# Patient Record
Sex: MALE | Race: BLACK OR AFRICAN AMERICAN | NOT HISPANIC OR LATINO | URBAN - METROPOLITAN AREA
[De-identification: names, ages, dates, MRNs, and addresses within clinical notes are randomized per-mention and may not be internally consistent; named-entity substitution may affect disease eponyms.]

---

## 2017-04-04 ENCOUNTER — INPATIENT (INPATIENT)
Facility: HOSPITAL | Age: 82
LOS: 1 days | Discharge: HOME HEALTH | End: 2017-04-06
Attending: HOSPITALIST | Admitting: HOSPITALIST
Payer: MEDICARE

## 2017-04-04 VITALS
HEART RATE: 66 BPM | RESPIRATION RATE: 22 BRPM | SYSTOLIC BLOOD PRESSURE: 157 MMHG | DIASTOLIC BLOOD PRESSURE: 57 MMHG | OXYGEN SATURATION: 99 %

## 2017-04-04 DIAGNOSIS — E11.9 TYPE 2 DIABETES MELLITUS WITHOUT COMPLICATIONS: ICD-10-CM

## 2017-04-04 DIAGNOSIS — I10 ESSENTIAL (PRIMARY) HYPERTENSION: ICD-10-CM

## 2017-04-04 DIAGNOSIS — R41.89 OTHER SYMPTOMS AND SIGNS INVOLVING COGNITIVE FUNCTIONS AND AWARENESS: ICD-10-CM

## 2017-04-04 DIAGNOSIS — N40.0 BENIGN PROSTATIC HYPERPLASIA WITHOUT LOWER URINARY TRACT SYMPTOMS: ICD-10-CM

## 2017-04-04 DIAGNOSIS — Z41.8 ENCOUNTER FOR OTHER PROCEDURES FOR PURPOSES OTHER THAN REMEDYING HEALTH STATE: ICD-10-CM

## 2017-04-04 DIAGNOSIS — N17.9 ACUTE KIDNEY FAILURE, UNSPECIFIED: ICD-10-CM

## 2017-04-04 DIAGNOSIS — R41.82 ALTERED MENTAL STATUS, UNSPECIFIED: ICD-10-CM

## 2017-04-04 DIAGNOSIS — F03.90 UNSPECIFIED DEMENTIA, UNSPECIFIED SEVERITY, WITHOUT BEHAVIORAL DISTURBANCE, PSYCHOTIC DISTURBANCE, MOOD DISTURBANCE, AND ANXIETY: ICD-10-CM

## 2017-04-04 LAB
ALBUMIN SERPL ELPH-MCNC: 3.8 G/DL — SIGNIFICANT CHANGE UP (ref 3.3–5)
ALP SERPL-CCNC: 85 U/L — SIGNIFICANT CHANGE UP (ref 40–120)
ALT FLD-CCNC: 16 U/L — SIGNIFICANT CHANGE UP (ref 4–41)
APTT BLD: 27.3 SEC — LOW (ref 27.5–37.4)
AST SERPL-CCNC: 26 U/L — SIGNIFICANT CHANGE UP (ref 4–40)
BASE EXCESS BLDV CALC-SCNC: 0.2 MMOL/L — SIGNIFICANT CHANGE UP
BASE EXCESS BLDV CALC-SCNC: 4.2 MMOL/L — SIGNIFICANT CHANGE UP
BASOPHILS # BLD AUTO: 0.01 K/UL — SIGNIFICANT CHANGE UP (ref 0–0.2)
BASOPHILS NFR BLD AUTO: 0.2 % — SIGNIFICANT CHANGE UP (ref 0–2)
BILIRUB SERPL-MCNC: < 0.2 MG/DL — LOW (ref 0.2–1.2)
BLOOD GAS VENOUS - CREATININE: 1.39 MG/DL — HIGH (ref 0.5–1.3)
BLOOD GAS VENOUS - CREATININE: SIGNIFICANT CHANGE UP MG/DL (ref 0.5–1.3)
BUN SERPL-MCNC: 23 MG/DL — SIGNIFICANT CHANGE UP (ref 7–23)
CALCIUM SERPL-MCNC: 9.1 MG/DL — SIGNIFICANT CHANGE UP (ref 8.4–10.5)
CHLORIDE BLDV-SCNC: 109 MMOL/L — HIGH (ref 96–108)
CHLORIDE BLDV-SCNC: 109 MMOL/L — HIGH (ref 96–108)
CHLORIDE SERPL-SCNC: 104 MMOL/L — SIGNIFICANT CHANGE UP (ref 98–107)
CK MB BLD-MCNC: 1.7 — SIGNIFICANT CHANGE UP (ref 0–2.5)
CK MB BLD-MCNC: 3.25 NG/ML — SIGNIFICANT CHANGE UP (ref 1–6.6)
CK SERPL-CCNC: 190 U/L — SIGNIFICANT CHANGE UP (ref 30–200)
CO2 SERPL-SCNC: 16 MMOL/L — LOW (ref 22–31)
CREAT SERPL-MCNC: 1.42 MG/DL — HIGH (ref 0.5–1.3)
EOSINOPHIL # BLD AUTO: 0.11 K/UL — SIGNIFICANT CHANGE UP (ref 0–0.5)
EOSINOPHIL NFR BLD AUTO: 1.7 % — SIGNIFICANT CHANGE UP (ref 0–6)
GAS PNL BLDV: 137 MMOL/L — SIGNIFICANT CHANGE UP (ref 136–146)
GAS PNL BLDV: 137 MMOL/L — SIGNIFICANT CHANGE UP (ref 136–146)
GLUCOSE BLDV-MCNC: 133 — HIGH (ref 70–99)
GLUCOSE BLDV-MCNC: 164 — HIGH (ref 70–99)
GLUCOSE SERPL-MCNC: 171 MG/DL — HIGH (ref 70–99)
HCO3 BLDV-SCNC: 23 MMOL/L — SIGNIFICANT CHANGE UP (ref 20–27)
HCO3 BLDV-SCNC: 27 MMOL/L — SIGNIFICANT CHANGE UP (ref 20–27)
HCT VFR BLD CALC: 42.8 % — SIGNIFICANT CHANGE UP (ref 39–50)
HCT VFR BLDV CALC: 38.8 % — LOW (ref 39–51)
HCT VFR BLDV CALC: 43.9 % — SIGNIFICANT CHANGE UP (ref 39–51)
HGB BLD-MCNC: 13.8 G/DL — SIGNIFICANT CHANGE UP (ref 13–17)
HGB BLDV-MCNC: 12.6 G/DL — LOW (ref 13–17)
HGB BLDV-MCNC: 14.3 G/DL — SIGNIFICANT CHANGE UP (ref 13–17)
IMM GRANULOCYTES NFR BLD AUTO: 0.2 % — SIGNIFICANT CHANGE UP (ref 0–1.5)
INR BLD: 1 — SIGNIFICANT CHANGE UP (ref 0.88–1.17)
LACTATE BLDV-MCNC: 1.6 MMOL/L — SIGNIFICANT CHANGE UP (ref 0.5–2)
LACTATE BLDV-MCNC: 2.9 MMOL/L — HIGH (ref 0.5–2)
LYMPHOCYTES # BLD AUTO: 2.84 K/UL — SIGNIFICANT CHANGE UP (ref 1–3.3)
LYMPHOCYTES # BLD AUTO: 44.8 % — HIGH (ref 13–44)
MCHC RBC-ENTMCNC: 29.6 PG — SIGNIFICANT CHANGE UP (ref 27–34)
MCHC RBC-ENTMCNC: 32.2 % — SIGNIFICANT CHANGE UP (ref 32–36)
MCV RBC AUTO: 91.8 FL — SIGNIFICANT CHANGE UP (ref 80–100)
MONOCYTES # BLD AUTO: 0.3 K/UL — SIGNIFICANT CHANGE UP (ref 0–0.9)
MONOCYTES NFR BLD AUTO: 4.7 % — SIGNIFICANT CHANGE UP (ref 2–14)
NEUTROPHILS # BLD AUTO: 3.07 K/UL — SIGNIFICANT CHANGE UP (ref 1.8–7.4)
NEUTROPHILS NFR BLD AUTO: 48.4 % — SIGNIFICANT CHANGE UP (ref 43–77)
PCO2 BLDV: 47 MMHG — SIGNIFICANT CHANGE UP (ref 41–51)
PCO2 BLDV: 49 MMHG — SIGNIFICANT CHANGE UP (ref 41–51)
PH BLDV: 7.34 PH — SIGNIFICANT CHANGE UP (ref 7.32–7.43)
PH BLDV: 7.4 PH — SIGNIFICANT CHANGE UP (ref 7.32–7.43)
PLATELET # BLD AUTO: 149 K/UL — LOW (ref 150–400)
PMV BLD: 11.6 FL — SIGNIFICANT CHANGE UP (ref 7–13)
PO2 BLDV: 24 MMHG — LOW (ref 35–40)
PO2 BLDV: 37 MMHG — SIGNIFICANT CHANGE UP (ref 35–40)
POTASSIUM BLDV-SCNC: 4 MMOL/L — SIGNIFICANT CHANGE UP (ref 3.4–4.5)
POTASSIUM BLDV-SCNC: 4 MMOL/L — SIGNIFICANT CHANGE UP (ref 3.4–4.5)
POTASSIUM SERPL-MCNC: 4.8 MMOL/L — SIGNIFICANT CHANGE UP (ref 3.5–5.3)
POTASSIUM SERPL-SCNC: 4.8 MMOL/L — SIGNIFICANT CHANGE UP (ref 3.5–5.3)
PROT SERPL-MCNC: 7.1 G/DL — SIGNIFICANT CHANGE UP (ref 6–8.3)
PROTHROM AB SERPL-ACNC: 11.2 SEC — SIGNIFICANT CHANGE UP (ref 9.8–13.1)
RBC # BLD: 4.66 M/UL — SIGNIFICANT CHANGE UP (ref 4.2–5.8)
RBC # FLD: 13.2 % — SIGNIFICANT CHANGE UP (ref 10.3–14.5)
SAO2 % BLDV: 39.2 % — LOW (ref 60–85)
SAO2 % BLDV: 63 % — SIGNIFICANT CHANGE UP (ref 60–85)
SODIUM SERPL-SCNC: 140 MMOL/L — SIGNIFICANT CHANGE UP (ref 135–145)
TROPONIN T SERPL-MCNC: < 0.06 NG/ML — SIGNIFICANT CHANGE UP (ref 0–0.06)
WBC # BLD: 6.34 K/UL — SIGNIFICANT CHANGE UP (ref 3.8–10.5)
WBC # FLD AUTO: 6.34 K/UL — SIGNIFICANT CHANGE UP (ref 3.8–10.5)

## 2017-04-04 PROCEDURE — 70450 CT HEAD/BRAIN W/O DYE: CPT | Mod: 26

## 2017-04-04 PROCEDURE — 71010: CPT | Mod: 26

## 2017-04-04 PROCEDURE — 99223 1ST HOSP IP/OBS HIGH 75: CPT | Mod: GC

## 2017-04-04 RX ORDER — DONEPEZIL HYDROCHLORIDE 10 MG/1
10 TABLET, FILM COATED ORAL AT BEDTIME
Qty: 0 | Refills: 0 | Status: DISCONTINUED | OUTPATIENT
Start: 2017-04-04 | End: 2017-04-06

## 2017-04-04 RX ORDER — TAMSULOSIN HYDROCHLORIDE 0.4 MG/1
0.4 CAPSULE ORAL AT BEDTIME
Qty: 0 | Refills: 0 | Status: DISCONTINUED | OUTPATIENT
Start: 2017-04-04 | End: 2017-04-06

## 2017-04-04 RX ORDER — INSULIN LISPRO 100/ML
VIAL (ML) SUBCUTANEOUS
Qty: 0 | Refills: 0 | Status: DISCONTINUED | OUTPATIENT
Start: 2017-04-04 | End: 2017-04-06

## 2017-04-04 RX ORDER — INSULIN LISPRO 100/ML
VIAL (ML) SUBCUTANEOUS AT BEDTIME
Qty: 0 | Refills: 0 | Status: DISCONTINUED | OUTPATIENT
Start: 2017-04-04 | End: 2017-04-06

## 2017-04-04 RX ORDER — GLUCAGON INJECTION, SOLUTION 0.5 MG/.1ML
1 INJECTION, SOLUTION SUBCUTANEOUS ONCE
Qty: 0 | Refills: 0 | Status: DISCONTINUED | OUTPATIENT
Start: 2017-04-04 | End: 2017-04-06

## 2017-04-04 RX ORDER — ASPIRIN/CALCIUM CARB/MAGNESIUM 324 MG
81 TABLET ORAL DAILY
Qty: 0 | Refills: 0 | Status: DISCONTINUED | OUTPATIENT
Start: 2017-04-04 | End: 2017-04-06

## 2017-04-04 RX ORDER — DEXTROSE 50 % IN WATER 50 %
25 SYRINGE (ML) INTRAVENOUS ONCE
Qty: 0 | Refills: 0 | Status: DISCONTINUED | OUTPATIENT
Start: 2017-04-04 | End: 2017-04-06

## 2017-04-04 RX ORDER — DEXTROSE 50 % IN WATER 50 %
1 SYRINGE (ML) INTRAVENOUS ONCE
Qty: 0 | Refills: 0 | Status: DISCONTINUED | OUTPATIENT
Start: 2017-04-04 | End: 2017-04-06

## 2017-04-04 RX ORDER — HEPARIN SODIUM 5000 [USP'U]/ML
5000 INJECTION INTRAVENOUS; SUBCUTANEOUS EVERY 12 HOURS
Qty: 0 | Refills: 0 | Status: DISCONTINUED | OUTPATIENT
Start: 2017-04-04 | End: 2017-04-06

## 2017-04-04 RX ORDER — METOPROLOL TARTRATE 50 MG
50 TABLET ORAL DAILY
Qty: 0 | Refills: 0 | Status: DISCONTINUED | OUTPATIENT
Start: 2017-04-04 | End: 2017-04-05

## 2017-04-04 RX ORDER — DEXTROSE 50 % IN WATER 50 %
12.5 SYRINGE (ML) INTRAVENOUS ONCE
Qty: 0 | Refills: 0 | Status: DISCONTINUED | OUTPATIENT
Start: 2017-04-04 | End: 2017-04-06

## 2017-04-04 RX ORDER — SODIUM CHLORIDE 9 MG/ML
1000 INJECTION, SOLUTION INTRAVENOUS
Qty: 0 | Refills: 0 | Status: DISCONTINUED | OUTPATIENT
Start: 2017-04-04 | End: 2017-04-06

## 2017-04-04 NOTE — H&P ADULT. - NEGATIVE GASTROINTESTINAL SYMPTOMS
no melena/no hematochezia/no vomiting/no diarrhea/no constipation/no abdominal pain/no nausea/no jaundice no hematochezia/no diarrhea/no vomiting/no change in bowel habits/no abdominal pain/no nausea/no constipation/no melena

## 2017-04-04 NOTE — H&P ADULT. - PROBLEM SELECTOR PLAN 5
Continue with Flomax daily CT head revealed "Preserved gray-white matter differentiation. No intracranial hemorrhage, mass effect or displaced/depressed calvarial fracture. Cystic focus within the posterior sella turcica which may represent a pituitary cyst or cystic adenoma. Clinical correlation will determine the need for nonemergent contrast enhanced MR of the pituitary gland for further evaluation"  Will need to check Cr in am and if improved post fluids will order MRI of pituitary gland

## 2017-04-04 NOTE — H&P ADULT. - NEGATIVE MUSCULOSKELETAL SYMPTOMS
no arthralgia/no back pain/no leg pain L/no neck pain/no myalgia/no muscle cramps/no arm pain L/no joint swelling/no leg pain R/no arm pain R no stiffness/no muscle weakness/no neck pain/no muscle cramps/no arthralgia/no joint swelling/no myalgia

## 2017-04-04 NOTE — H&P ADULT. - FAMILY HISTORY
Mother  Still living? Unknown  Family history of diabetes mellitus, Age at diagnosis: Age Unknown  Family history of hypertension, Age at diagnosis: Age Unknown     Child  Still living? Unknown  Family history of lymphoma, Age at diagnosis: Age Unknown

## 2017-04-04 NOTE — H&P ADULT. - NEGATIVE CARDIOVASCULAR SYMPTOMS
no claudication/no palpitations/no orthopnea/no chest pain/no dyspnea on exertion/no paroxysmal nocturnal dyspnea no orthopnea/no paroxysmal nocturnal dyspnea/no palpitations/no chest pain/no dyspnea on exertion

## 2017-04-04 NOTE — H&P ADULT. - NEGATIVE OPHTHALMOLOGIC SYMPTOMS
no loss of vision L/no blurred vision L/no photophobia/no loss of vision R/no diplopia/no blurred vision R no photophobia/no lacrimation L/no discharge R/no discharge L/no lacrimation R/no diplopia/no blurred vision L/no blurred vision R

## 2017-04-04 NOTE — ED PROVIDER NOTE - ATTENDING CONTRIBUTION TO CARE
DR. PATEL, ATTENDING MD-  I performed a face to face bedside interview with patient regarding history of present illness, review of symptoms and past medical history. I completed an independent physical exam.  I have discussed patient's plan of care with the resident.   Documentation as above in the note.    attending: my exam: afocal neuro exam, lungs ctab, abd soft, nt.  A/P:altered mental status, likely secondary to hypotension (unclear etiology), now resolved.  Patient last seen normal prior to the event  >3 hours ago, anmd sx now fully resolved, so I did not activate code stroke.  Will check ekg, cardiac enzymes, cbc, cmp, ct head, cxr.  Admit.  currently HD stable, breathing comfortable, at baseline MS

## 2017-04-04 NOTE — H&P ADULT. - GASTROINTESTINAL DETAILS
no masses palpable/normal/bowel sounds normal/soft/no rebound tenderness/nontender/no bruit/no rigidity/no guarding/no distention normal/nontender/no distention/no guarding/soft/no rebound tenderness/no rigidity/bowel sounds normal

## 2017-04-04 NOTE — H&P ADULT. - NEUROLOGICAL DETAILS
cranial nerves intact/disoriented/normal strength/no spontaneous movement/responds to verbal commands responds to verbal commands/sensation intact

## 2017-04-04 NOTE — ED PROVIDER NOTE - OBJECTIVE STATEMENT
h/o mild dementia, history obtained from daughter.  Daughter reports pt in [facility] today (baseline mild dementia, but able to communicate well, just "confused" at baseline), last seen normal today ~1430, then ~30 minutes later she found him slumped over forward, non-responsive and drooling.  No facial asymmetry noted by daughter.  Daughter gave him "sugar" under tongue.  EMS found pt "60 over palp" per daughter, and FSG in 200's.  Patient became more responsive and returned to baseline ~10-20 minutes later after receiving fluids.  Currently has no complaints.  Daughter reports no recent fevers, decreased appetite, nausea, chest pain, cough.  Does report having large BM earlier today.  Otherwise no complaints h/o mild dementia, history obtained from daughter.  Daughter reports pt in [facility] today (baseline mild dementia, but able to communicate well, just "confused" with "sundowning at night" at baseline), last seen normal today ~1430, then ~30 minutes later she found him slumped over forward, non-responsive and drooling.  No facial asymmetry noted by daughter.  Daughter gave him "sugar" under tongue.  EMS found pt "60 over palp" per daughter, and FSG in 200's.  Patient became more responsive and returned to baseline ~10-20 minutes later after receiving fluids.  Currently has no complaints.  Daughter reports no recent fevers, decreased appetite, nausea, chest pain, cough.  Does report having large BM earlier today.  Otherwise no complaints

## 2017-04-04 NOTE — ED PROVIDER NOTE - PMH
Blood incompatibility with cadaveric donor    Cataract    Diabetes mellitus type II    Direct inguinal hernia    Glaucoma    H/O: HTN (hypertension)    H/O: osteoarthritis    History of BPH    History of short term memory loss    Incontinence of urine    Urinary urgency

## 2017-04-04 NOTE — ED ADULT TRIAGE NOTE - CHIEF COMPLAINT QUOTE
Pt notification became unresponsive  approx 20 mins  while sitting at the table; BP90/40; HR 44.  .  Denies headache, dizziness, chest pain, nausea

## 2017-04-04 NOTE — H&P ADULT. - HISTORY OF PRESENT ILLNESS
83 y/o male with h/o dementia, CAD with stent x 1, DM2, and HTN p/w altered mental status. Pt is A&O x 2 but unable to remember what happened. Obtained history from daughter at bedside. Daughter reports that after eating lunch today pt was found non-responsive, drooling, and  slumped in chair at home. She states that his speech was slurred but had no facial asymmetry. Daughter took his BP during episode and found it to be "60 over palp," as per daughter. She gave him some sugar underneath the tongue and reports that EMS found FS to be 226 when they arrived. Pt became responsive10-15 minutes after EMS arrived. Daughter states that pt had one large non-bloody BM with loose stools before lunch and pt had bowel incontinence during episode. Daughter states that pt is currently at baseline. Pt currently c/o right groin pain x 3 days. Pain is intermittent, non-radiating and 7/10. Daughter reports that this is the first time she has heard him complain of this symptom. Denies any precipitating symptoms before episode and denies cp, palpitations, SOB, urinary incontinence, headaches, dizziness, vision changes, fevers, chills, recent illness, abdominal pain, n/v/d, dysuria, hematuria, melena, or hematochezia. *HPI and ROS was obtained from patient's     85 y/o male with h/o dementia, CAD with stent x 1, DM2, and HTN p/w altered mental status. Pt is A&O x 2 but unable to remember what happened. Obtained history from daughter at bedside. Daughter reports that after eating lunch today pt was found non-responsive, drooling, and  slumped in chair at home. She states that his speech was slurred but had no facial asymmetry. Daughter took his BP during episode and found it to be "60 over palp," as per daughter. She gave him some sugar underneath the tongue and reports that EMS found FS to be 226 when they arrived. Pt became responsive10-15 minutes after EMS arrived. Daughter states that pt had one large non-bloody BM with loose stools before lunch and pt had bowel incontinence during episode. Daughter states that pt is currently at baseline. Pt currently c/o right groin pain x 3 days. Pain is intermittent, non-radiating and 7/10. Daughter reports that this is the first time she has heard him complain of this symptom. Denies any precipitating symptoms before episode and denies cp, palpitations, SOB, urinary incontinence, headaches, dizziness, vision changes, fevers, chills, recent illness, abdominal pain, n/v/d, dysuria, hematuria, melena, or hematochezia. *HPI and ROS was obtained from patient's daughter who was at bedside and provided information as patient is AAOx2, unsure of the events*    85 y/o M with PMH of dementia, CAD(s/p 2 stents), DM, BPH, and HTN was brought in by daughter for episode of unresponsiveness earlier today. As per daughter her father was in his usual state of health and had finished his lunch at around 2:30pm. About 30-45 minutes later daughter noticed that her father had slumped over forward, non-responsive and drooling. The unresponsiveness lasted about few minutes and daughter checked his blood pressure and noticed that it was 60/palp and that time she was unable to check his sugars but gave him "some sugar under the tongue" and then patient slowly started to come back to "baseline" as per daughter. When EMS was called they also noticed that his blood pressure was low and gave him some fluid and his blood glucose was noted to be 226. Daughter denied any prior syncopal triggers such as lightheadedness or dizziness. Daughter stated that this has never happened to her father before. Daughter did state that her father does not "drink much fluid" at home due to his increased urinary frequency due to his underline BPH. Daughter denied any fevers, chills, cough, CP, SOB, N/V/D/C, headaches, abdominal pain, dysuria, melena, hematochezia, recent travel, sick contact, pleuritic or positional chest pain.     On ED admission EKG revealed NSR at a rate of 61 with TWI in lead V4-V6, CE x 1: Negative, Plt: 149, BUN/Cr: 23/1.42, Gluc: 171. CT head: Preserved gray-white matter differentiation. No intracranial hemorrhage, mass effect or displaced/depressed calvarial fracture. Cystic focus within the posterior sella turcica which may represent a pituitary cyst or cystic adenoma. Clinical correlation will determine the need for nonemergent contrast enhanced MR of the pituitary gland for further evaluation. Prelim CXR: No urgent/emergent findings. When examined patient is resting in the stretcher and complained of right groin pain, denied any other complaints.

## 2017-04-04 NOTE — H&P ADULT. - MUSCULOSKELETAL
details… detailed exam normal/normal strength/no joint swelling/no calf tenderness/ROM intact no calf tenderness/no joint swelling/no joint warmth/no joint erythema

## 2017-04-04 NOTE — H&P ADULT. - ASSESSMENT
85 y/o M with PMH of Dementia, HLD, HTN, DM, BPH was brought in by daughter for episode of unresponsiveness.

## 2017-04-04 NOTE — H&P ADULT. - PROBLEM SELECTOR PLAN 1
Will admit to telemetry, serial CE's, serial EKG  HgbA1C, TSH, lipid profile, CBC, CMP in am   TTE ordered to evaluate LVEF   Orthostatics ordered  UA ordered Likely in the setting of hypoglycemia as daughter stated that her "father started to come to baseline after she gave him sugars under the tongue"   Will admit to telemetry, serial CE's, serial EKG  HgbA1C, TSH, lipid profile, CBC, CMP in am   TTE ordered to evaluate LVEF   Orthostatics ordered  UA ordered

## 2017-04-04 NOTE — H&P ADULT. - NEGATIVE GENERAL GENITOURINARY SYMPTOMS
no dysuria/no urine discoloration/no hematuria no dysuria/no renal colic/no flank pain L/no flank pain R/no hematuria

## 2017-04-04 NOTE — ED PROVIDER NOTE - MEDICAL DECISION MAKING DETAILS
attending: altered mental status, likely secondary to hypotension (unclear etiology), now resolved.  Patient last seen normal prior to the event  >3 hours ago, anmd sx now fully resolved, so I did not activate code stroke.  Will check ekg, cardiac enzymes, cbc, cmp, ct head, cxr.  Admit.  currently HD stable, breathing comfortable, at baseline MS

## 2017-04-04 NOTE — ED ADULT NURSE NOTE - OBJECTIVE STATEMENT
Alert and oriented x 4. Pt received to spot 17 complaining of syncopal episode one hour ago. As per daughter he was eating at the table and suddenly stopped responding and became incontinent. Daughter called EMS. BP was in the 90s. Pt has Hx of Dementia. Pt denies chest pain, shortness of breath, nausea, vomiting or dizziness. Pt usually ambulates. IV 18g to right AC. Labs drawn. VSS. Family at bedside. Will continue to monitor.

## 2017-04-04 NOTE — H&P ADULT. - PROBLEM SELECTOR PLAN 2
BUN/Cr: 23/1.42 likely in the setting of decreased PO intake  Will start patient on IVF at 75cc/hr for 10 hours and re-evaluate with CMP in am   Avoid nephrotoxic medications

## 2017-04-04 NOTE — H&P ADULT. - RS GEN PE MLT RESP DETAILS PC
no rales/good air movement/clear to auscultation bilaterally/respirations non-labored/no rhonchi/normal/no wheezes/airway patent/breath sounds equal no intercostal retractions/good air movement/no rales/no rhonchi/airway patent/breath sounds equal/clear to auscultation bilaterally/no wheezes/normal/respirations non-labored/no chest wall tenderness

## 2017-04-04 NOTE — ED PROVIDER NOTE - PSH
Direct inguinal hernia repair - left - 20 yrs ago    H/O arthroscopy of left knee - 2012    h/o bilat cataract repair -  3 yrs ago    H/O coronary angioplasty and stent x 1 - 5 yrs ago    sigmoid resection - 20 yrs ago

## 2017-04-04 NOTE — H&P ADULT. - NEGATIVE GENERAL SYMPTOMS
no fever/no sweating/no weight loss/no weight gain/no chills no anorexia/no fever/no weight gain/no malaise/no weight loss/no fatigue/no chills/no sweating

## 2017-04-04 NOTE — ED ADULT NURSE REASSESSMENT NOTE - NS ED NURSE REASSESS COMMENT FT1
Pt received on stretcher in room 17 with visitor at bedside. Pt is awake, A&Ox3, NAD observed, respirations even and unlabored on room air. Pt placed on cardiac monitor, NSR observed, HR 62. VS recorded. PA Moscoso at bedside. PA aware of elevated BP. Pt assigned to RADS, admitted to Tele for AMS, awaiting bed assignment. UA pending. Comfort and safety measures provided. Call bell within reach.

## 2017-04-04 NOTE — H&P ADULT. - RADIOLOGY RESULTS AND INTERPRETATION
CT head: Preserved gray-white matter differentiation. No intracranial hemorrhage, mass effect or displaced/depressed calvarial fracture. Cystic focus within the posterior sella turcica which may represent a pituitary cyst or cystic adenoma. Clinical correlation will determine the need for nonemergent contrast enhanced MR of the pituitary gland for   further evaluation.  Prelim CXR: No urgent/emergent findings

## 2017-04-04 NOTE — H&P ADULT. - PROBLEM SELECTOR PLAN 4
On insulin sliding scale(humalog)  FS TID Daily, HgbA1C in am On insulin sliding scale(humalog)  FS TID Daily, HgbA1C in am  Will get /social work involved for aide placement for better glycemic control

## 2017-04-04 NOTE — ED ADULT NURSE REASSESSMENT NOTE - NS ED NURSE REASSESS COMMENT FT1
Report given to receiving RADS LUCAS Oliva. Transferred to RADS 2 on stretcher by PCA with visitor at bedside.

## 2017-04-04 NOTE — ED PROVIDER NOTE - CONSTITUTIONAL, MLM
normal... Well appearing, well nourished, awake, alert, oriented to person, place, does not know year or president, and in no apparent distress.

## 2017-04-04 NOTE — H&P ADULT. - NEGATIVE NEUROLOGICAL SYMPTOMS
no difficulty walking/no paresthesias/no headache no weakness/no headache/no transient paralysis/no paresthesias/no generalized seizures/no vertigo/no loss of sensation/no focal seizures/no tremors/no difficulty walking

## 2017-04-04 NOTE — H&P ADULT. - NEGATIVE ENMT SYMPTOMS
no dysphagia/no tinnitus/no hearing difficulty/no nose bleeds/no ear pain/no throat pain/no nasal congestion/no nasal discharge/no gum bleeding no tinnitus/no nasal congestion/no hearing difficulty/no ear pain/no nasal discharge/no vertigo/no sinus symptoms

## 2017-04-05 DIAGNOSIS — E23.6 OTHER DISORDERS OF PITUITARY GLAND: ICD-10-CM

## 2017-04-05 LAB
ALBUMIN SERPL ELPH-MCNC: 3.5 G/DL — SIGNIFICANT CHANGE UP (ref 3.3–5)
ALP SERPL-CCNC: 72 U/L — SIGNIFICANT CHANGE UP (ref 40–120)
ALT FLD-CCNC: 17 U/L — SIGNIFICANT CHANGE UP (ref 4–41)
APPEARANCE UR: CLEAR — SIGNIFICANT CHANGE UP
AST SERPL-CCNC: 16 U/L — SIGNIFICANT CHANGE UP (ref 4–40)
BILIRUB SERPL-MCNC: 0.3 MG/DL — SIGNIFICANT CHANGE UP (ref 0.2–1.2)
BILIRUB UR-MCNC: NEGATIVE — SIGNIFICANT CHANGE UP
BLOOD UR QL VISUAL: HIGH
BUN SERPL-MCNC: 20 MG/DL — SIGNIFICANT CHANGE UP (ref 7–23)
CALCIUM SERPL-MCNC: 9.1 MG/DL — SIGNIFICANT CHANGE UP (ref 8.4–10.5)
CHLORIDE SERPL-SCNC: 106 MMOL/L — SIGNIFICANT CHANGE UP (ref 98–107)
CHOLEST SERPL-MCNC: 159 MG/DL — SIGNIFICANT CHANGE UP (ref 120–199)
CK MB BLD-MCNC: 1.9 — SIGNIFICANT CHANGE UP (ref 0–2.5)
CK MB BLD-MCNC: 2.93 NG/ML — SIGNIFICANT CHANGE UP (ref 1–6.6)
CK SERPL-CCNC: 156 U/L — SIGNIFICANT CHANGE UP (ref 30–200)
CO2 SERPL-SCNC: 23 MMOL/L — SIGNIFICANT CHANGE UP (ref 22–31)
COLOR SPEC: SIGNIFICANT CHANGE UP
CREAT SERPL-MCNC: 1.16 MG/DL — SIGNIFICANT CHANGE UP (ref 0.5–1.3)
GLUCOSE SERPL-MCNC: 73 MG/DL — SIGNIFICANT CHANGE UP (ref 70–99)
GLUCOSE UR-MCNC: NEGATIVE — SIGNIFICANT CHANGE UP
HBA1C BLD-MCNC: 5.9 % — HIGH (ref 4–5.6)
HCT VFR BLD CALC: 37.3 % — LOW (ref 39–50)
HDLC SERPL-MCNC: 45 MG/DL — SIGNIFICANT CHANGE UP (ref 35–55)
HGB BLD-MCNC: 12.1 G/DL — LOW (ref 13–17)
KETONES UR-MCNC: NEGATIVE — SIGNIFICANT CHANGE UP
LEUKOCYTE ESTERASE UR-ACNC: NEGATIVE — SIGNIFICANT CHANGE UP
LIPID PNL WITH DIRECT LDL SERPL: 117 MG/DL — SIGNIFICANT CHANGE UP
MAGNESIUM SERPL-MCNC: 1.9 MG/DL — SIGNIFICANT CHANGE UP (ref 1.6–2.6)
MCHC RBC-ENTMCNC: 29.1 PG — SIGNIFICANT CHANGE UP (ref 27–34)
MCHC RBC-ENTMCNC: 32.4 % — SIGNIFICANT CHANGE UP (ref 32–36)
MCV RBC AUTO: 89.7 FL — SIGNIFICANT CHANGE UP (ref 80–100)
MUCOUS THREADS # UR AUTO: SIGNIFICANT CHANGE UP
NITRITE UR-MCNC: NEGATIVE — SIGNIFICANT CHANGE UP
PH UR: 6.5 — SIGNIFICANT CHANGE UP (ref 4.6–8)
PHOSPHATE SERPL-MCNC: 3.6 MG/DL — SIGNIFICANT CHANGE UP (ref 2.5–4.5)
PLATELET # BLD AUTO: 143 K/UL — LOW (ref 150–400)
PMV BLD: 11.3 FL — SIGNIFICANT CHANGE UP (ref 7–13)
POTASSIUM SERPL-MCNC: 4 MMOL/L — SIGNIFICANT CHANGE UP (ref 3.5–5.3)
POTASSIUM SERPL-SCNC: 4 MMOL/L — SIGNIFICANT CHANGE UP (ref 3.5–5.3)
PROT SERPL-MCNC: 6.3 G/DL — SIGNIFICANT CHANGE UP (ref 6–8.3)
PROT UR-MCNC: 10 — SIGNIFICANT CHANGE UP
RBC # BLD: 4.16 M/UL — LOW (ref 4.2–5.8)
RBC # FLD: 13 % — SIGNIFICANT CHANGE UP (ref 10.3–14.5)
RBC CASTS # UR COMP ASSIST: HIGH (ref 0–?)
SODIUM SERPL-SCNC: 143 MMOL/L — SIGNIFICANT CHANGE UP (ref 135–145)
SP GR SPEC: 1.02 — SIGNIFICANT CHANGE UP (ref 1–1.03)
SQUAMOUS # UR AUTO: SIGNIFICANT CHANGE UP
TRIGL SERPL-MCNC: 49 MG/DL — SIGNIFICANT CHANGE UP (ref 10–149)
TROPONIN T SERPL-MCNC: < 0.06 NG/ML — SIGNIFICANT CHANGE UP (ref 0–0.06)
TSH SERPL-MCNC: 2.36 UIU/ML — SIGNIFICANT CHANGE UP (ref 0.27–4.2)
UROBILINOGEN FLD QL: NORMAL E.U. — SIGNIFICANT CHANGE UP (ref 0.1–0.2)
WBC # BLD: 5.55 K/UL — SIGNIFICANT CHANGE UP (ref 3.8–10.5)
WBC # FLD AUTO: 5.55 K/UL — SIGNIFICANT CHANGE UP (ref 3.8–10.5)
WBC UR QL: SIGNIFICANT CHANGE UP (ref 0–?)

## 2017-04-05 PROCEDURE — 99233 SBSQ HOSP IP/OBS HIGH 50: CPT

## 2017-04-05 RX ORDER — AMLODIPINE BESYLATE 2.5 MG/1
5 TABLET ORAL DAILY
Qty: 0 | Refills: 0 | Status: DISCONTINUED | OUTPATIENT
Start: 2017-04-05 | End: 2017-04-06

## 2017-04-05 RX ORDER — ATORVASTATIN CALCIUM 80 MG/1
20 TABLET, FILM COATED ORAL AT BEDTIME
Qty: 0 | Refills: 0 | Status: DISCONTINUED | OUTPATIENT
Start: 2017-04-05 | End: 2017-04-06

## 2017-04-05 RX ORDER — SODIUM CHLORIDE 9 MG/ML
1000 INJECTION INTRAMUSCULAR; INTRAVENOUS; SUBCUTANEOUS
Qty: 0 | Refills: 0 | Status: DISCONTINUED | OUTPATIENT
Start: 2017-04-05 | End: 2017-04-05

## 2017-04-05 RX ORDER — VALSARTAN 80 MG/1
160 TABLET ORAL DAILY
Qty: 0 | Refills: 0 | Status: DISCONTINUED | OUTPATIENT
Start: 2017-04-05 | End: 2017-04-06

## 2017-04-05 RX ADMIN — DONEPEZIL HYDROCHLORIDE 10 MILLIGRAM(S): 10 TABLET, FILM COATED ORAL at 22:39

## 2017-04-05 RX ADMIN — Medication 50 MILLIGRAM(S): at 05:37

## 2017-04-05 RX ADMIN — ATORVASTATIN CALCIUM 20 MILLIGRAM(S): 80 TABLET, FILM COATED ORAL at 22:39

## 2017-04-05 RX ADMIN — AMLODIPINE BESYLATE 5 MILLIGRAM(S): 2.5 TABLET ORAL at 22:39

## 2017-04-05 RX ADMIN — SODIUM CHLORIDE 75 MILLILITER(S): 9 INJECTION INTRAMUSCULAR; INTRAVENOUS; SUBCUTANEOUS at 01:02

## 2017-04-05 RX ADMIN — HEPARIN SODIUM 5000 UNIT(S): 5000 INJECTION INTRAVENOUS; SUBCUTANEOUS at 05:36

## 2017-04-05 RX ADMIN — HEPARIN SODIUM 5000 UNIT(S): 5000 INJECTION INTRAVENOUS; SUBCUTANEOUS at 19:07

## 2017-04-05 RX ADMIN — TAMSULOSIN HYDROCHLORIDE 0.4 MILLIGRAM(S): 0.4 CAPSULE ORAL at 22:39

## 2017-04-05 RX ADMIN — Medication 81 MILLIGRAM(S): at 12:20

## 2017-04-05 NOTE — PHYSICAL THERAPY INITIAL EVALUATION ADULT - PERTINENT HX OF CURRENT PROBLEM, REHAB EVAL
This is an 83 y/o M with PMH of Dementia, BPH was brought in by daughter for episode of unresponsiveness.

## 2017-04-05 NOTE — PHYSICAL THERAPY INITIAL EVALUATION ADULT - GENERAL OBSERVATIONS, REHAB EVAL
Patient received sitting OOB in a chair, (+) IV , daughter at bed side, BP in sitting 147/55 HR 68 SpO2 98% ,BP in standing 145/57 HR 69 SpO2 97% no c/o dizziness.

## 2017-04-06 VITALS
RESPIRATION RATE: 18 BRPM | DIASTOLIC BLOOD PRESSURE: 52 MMHG | OXYGEN SATURATION: 97 % | TEMPERATURE: 99 F | HEART RATE: 54 BPM | SYSTOLIC BLOOD PRESSURE: 135 MMHG

## 2017-04-06 LAB
HCT VFR BLD CALC: 37.8 % — LOW (ref 39–50)
HGB BLD-MCNC: 12.2 G/DL — LOW (ref 13–17)
MCHC RBC-ENTMCNC: 29 PG — SIGNIFICANT CHANGE UP (ref 27–34)
MCHC RBC-ENTMCNC: 32.3 % — SIGNIFICANT CHANGE UP (ref 32–36)
MCV RBC AUTO: 89.8 FL — SIGNIFICANT CHANGE UP (ref 80–100)
PLATELET # BLD AUTO: 133 K/UL — LOW (ref 150–400)
PMV BLD: 11.3 FL — SIGNIFICANT CHANGE UP (ref 7–13)
RBC # BLD: 4.21 M/UL — SIGNIFICANT CHANGE UP (ref 4.2–5.8)
RBC # FLD: 12.9 % — SIGNIFICANT CHANGE UP (ref 10.3–14.5)
WBC # BLD: 4.39 K/UL — SIGNIFICANT CHANGE UP (ref 3.8–10.5)
WBC # FLD AUTO: 4.39 K/UL — SIGNIFICANT CHANGE UP (ref 3.8–10.5)

## 2017-04-06 PROCEDURE — 99239 HOSP IP/OBS DSCHRG MGMT >30: CPT

## 2017-04-06 RX ORDER — ATORVASTATIN CALCIUM 80 MG/1
1 TABLET, FILM COATED ORAL
Qty: 30 | Refills: 0 | OUTPATIENT
Start: 2017-04-06 | End: 2017-05-06

## 2017-04-06 RX ORDER — METOPROLOL TARTRATE 50 MG
1 TABLET ORAL
Qty: 0 | Refills: 0 | COMMUNITY

## 2017-04-06 RX ORDER — AMLODIPINE BESYLATE 2.5 MG/1
1 TABLET ORAL
Qty: 30 | Refills: 0 | OUTPATIENT
Start: 2017-04-06 | End: 2017-05-06

## 2017-04-06 RX ORDER — METFORMIN HYDROCHLORIDE 850 MG/1
1 TABLET ORAL
Qty: 0 | Refills: 0 | COMMUNITY

## 2017-04-06 RX ORDER — DONEPEZIL HYDROCHLORIDE 10 MG/1
1 TABLET, FILM COATED ORAL
Qty: 0 | Refills: 0 | COMMUNITY

## 2017-04-06 RX ADMIN — Medication 81 MILLIGRAM(S): at 12:53

## 2017-04-06 RX ADMIN — VALSARTAN 160 MILLIGRAM(S): 80 TABLET ORAL at 07:48

## 2017-04-06 RX ADMIN — HEPARIN SODIUM 5000 UNIT(S): 5000 INJECTION INTRAVENOUS; SUBCUTANEOUS at 07:09

## 2017-04-06 NOTE — DISCHARGE NOTE ADULT - MEDICATION SUMMARY - MEDICATIONS TO TAKE
I will START or STAY ON the medications listed below when I get home from the hospital:    Lancets  -- Same brand as glucometer. Test blood sugar three times a day. Dispense 1 box   -- Indication: For Testing glucose levels     Glucose test strips   -- Test blood sugar three times daily. Dispense 2 boxes  -- Indication: For Testing glucose levels    Glucometer   -- Use as directed   -- Indication: For Testing glucose levels     Aspir 81 81 mg oral delayed release tablet  -- 1 tab(s) by mouth once a day  -- Indication: For cardiac protection    valsartan 160 mg oral tablet  -- 1 tab(s) by mouth once a day  -- Indication: For High blood pressure    Flomax 0.4 mg oral capsule  -- 1 cap(s) by mouth once a day  -- Indication: For BPH (benign prostatic hyperplasia)    atorvastatin 20 mg oral tablet  -- 1 tab(s) by mouth once a day (at bedtime)  -- Indication: For cholesterol    amLODIPine 5 mg oral tablet  -- 1 tab(s) by mouth once a day  -- Indication: For High blood pressure

## 2017-04-06 NOTE — DISCHARGE NOTE ADULT - PLAN OF CARE
Likely due to low blood sugars. You will stop taking Metformin,  and monitor your blood glucose to ensure it is within normal limits. If your level is below 60 immediately drink a cup of apple or orange juice and re-check your blood glucose 15 minutes later. Follow up with your PCP for continued care. resolution Your metoprolol  and donepezil was stopped due to your low heart rate. Instead of metoprolol, you were started on another blood pressure medication amlodipine. Please follow up with your PCP for blood pressure and heart rate monitoring. maintain heart rate at 60 bpm

## 2017-04-06 NOTE — DISCHARGE NOTE ADULT - MEDICATION SUMMARY - MEDICATIONS TO STOP TAKING
I will STOP taking the medications listed below when I get home from the hospital:    metoprolol tartrate 50 mg oral tablet  -- 1 tab(s) by mouth once a day  -- Confirmed: tartrate qd    Aricept 10 mg oral tablet  -- 1 tab(s) by mouth once a day (at bedtime)

## 2017-04-25 ENCOUNTER — RX RENEWAL (OUTPATIENT)
Age: 82
End: 2017-04-25

## 2017-05-30 ENCOUNTER — RX RENEWAL (OUTPATIENT)
Age: 82
End: 2017-05-30

## 2017-09-07 ENCOUNTER — EMERGENCY (EMERGENCY)
Facility: HOSPITAL | Age: 82
LOS: 1 days | Discharge: HOME | End: 2017-09-07
Attending: EMERGENCY MEDICINE | Admitting: EMERGENCY MEDICINE
Payer: MEDICARE

## 2017-09-07 VITALS
OXYGEN SATURATION: 100 % | TEMPERATURE: 98 F | RESPIRATION RATE: 18 BRPM | HEART RATE: 80 BPM | SYSTOLIC BLOOD PRESSURE: 150 MMHG | DIASTOLIC BLOOD PRESSURE: 70 MMHG

## 2017-09-07 VITALS
SYSTOLIC BLOOD PRESSURE: 134 MMHG | RESPIRATION RATE: 18 BRPM | DIASTOLIC BLOOD PRESSURE: 75 MMHG | TEMPERATURE: 98 F | HEART RATE: 67 BPM

## 2017-09-07 LAB
ALBUMIN SERPL ELPH-MCNC: 4.6 G/DL — SIGNIFICANT CHANGE UP (ref 3.3–5)
ALP SERPL-CCNC: 107 U/L — SIGNIFICANT CHANGE UP (ref 40–120)
ALT FLD-CCNC: 22 U/L — SIGNIFICANT CHANGE UP (ref 4–41)
AST SERPL-CCNC: 26 U/L — SIGNIFICANT CHANGE UP (ref 4–40)
BASOPHILS # BLD AUTO: 0.01 K/UL — SIGNIFICANT CHANGE UP (ref 0–0.2)
BASOPHILS NFR BLD AUTO: 0.1 % — SIGNIFICANT CHANGE UP (ref 0–2)
BILIRUB SERPL-MCNC: 0.4 MG/DL — SIGNIFICANT CHANGE UP (ref 0.2–1.2)
BUN SERPL-MCNC: 22 MG/DL — SIGNIFICANT CHANGE UP (ref 7–23)
CALCIUM SERPL-MCNC: 9.8 MG/DL — SIGNIFICANT CHANGE UP (ref 8.4–10.5)
CHLORIDE SERPL-SCNC: 106 MMOL/L — SIGNIFICANT CHANGE UP (ref 98–107)
CO2 SERPL-SCNC: 24 MMOL/L — SIGNIFICANT CHANGE UP (ref 22–31)
CREAT SERPL-MCNC: 1.36 MG/DL — HIGH (ref 0.5–1.3)
EOSINOPHIL # BLD AUTO: 0.03 K/UL — SIGNIFICANT CHANGE UP (ref 0–0.5)
EOSINOPHIL NFR BLD AUTO: 0.4 % — SIGNIFICANT CHANGE UP (ref 0–6)
GLUCOSE SERPL-MCNC: 92 MG/DL — SIGNIFICANT CHANGE UP (ref 70–99)
HCT VFR BLD CALC: 46.7 % — SIGNIFICANT CHANGE UP (ref 39–50)
HGB BLD-MCNC: 15.4 G/DL — SIGNIFICANT CHANGE UP (ref 13–17)
IMM GRANULOCYTES # BLD AUTO: 0.02 # — SIGNIFICANT CHANGE UP
IMM GRANULOCYTES NFR BLD AUTO: 0.3 % — SIGNIFICANT CHANGE UP (ref 0–1.5)
LYMPHOCYTES # BLD AUTO: 1.48 K/UL — SIGNIFICANT CHANGE UP (ref 1–3.3)
LYMPHOCYTES # BLD AUTO: 20.4 % — SIGNIFICANT CHANGE UP (ref 13–44)
MCHC RBC-ENTMCNC: 29.8 PG — SIGNIFICANT CHANGE UP (ref 27–34)
MCHC RBC-ENTMCNC: 33 % — SIGNIFICANT CHANGE UP (ref 32–36)
MCV RBC AUTO: 90.3 FL — SIGNIFICANT CHANGE UP (ref 80–100)
MONOCYTES # BLD AUTO: 0.52 K/UL — SIGNIFICANT CHANGE UP (ref 0–0.9)
MONOCYTES NFR BLD AUTO: 7.2 % — SIGNIFICANT CHANGE UP (ref 2–14)
NEUTROPHILS # BLD AUTO: 5.18 K/UL — SIGNIFICANT CHANGE UP (ref 1.8–7.4)
NEUTROPHILS NFR BLD AUTO: 71.6 % — SIGNIFICANT CHANGE UP (ref 43–77)
NRBC # FLD: 0 — SIGNIFICANT CHANGE UP
PLATELET # BLD AUTO: 154 K/UL — SIGNIFICANT CHANGE UP (ref 150–400)
PMV BLD: 11.1 FL — SIGNIFICANT CHANGE UP (ref 7–13)
POTASSIUM SERPL-MCNC: 4.9 MMOL/L — SIGNIFICANT CHANGE UP (ref 3.5–5.3)
POTASSIUM SERPL-SCNC: 4.9 MMOL/L — SIGNIFICANT CHANGE UP (ref 3.5–5.3)
PROT SERPL-MCNC: 8.6 G/DL — HIGH (ref 6–8.3)
RBC # BLD: 5.17 M/UL — SIGNIFICANT CHANGE UP (ref 4.2–5.8)
RBC # FLD: 12.9 % — SIGNIFICANT CHANGE UP (ref 10.3–14.5)
SODIUM SERPL-SCNC: 145 MMOL/L — SIGNIFICANT CHANGE UP (ref 135–145)
WBC # BLD: 7.24 K/UL — SIGNIFICANT CHANGE UP (ref 3.8–10.5)
WBC # FLD AUTO: 7.24 K/UL — SIGNIFICANT CHANGE UP (ref 3.8–10.5)

## 2017-09-07 PROCEDURE — 99284 EMERGENCY DEPT VISIT MOD MDM: CPT

## 2017-09-07 RX ORDER — SODIUM CHLORIDE 9 MG/ML
500 INJECTION INTRAMUSCULAR; INTRAVENOUS; SUBCUTANEOUS ONCE
Qty: 0 | Refills: 0 | Status: COMPLETED | OUTPATIENT
Start: 2017-09-07 | End: 2017-09-07

## 2017-09-07 RX ADMIN — SODIUM CHLORIDE 500 MILLILITER(S): 9 INJECTION INTRAMUSCULAR; INTRAVENOUS; SUBCUTANEOUS at 20:07

## 2017-09-07 NOTE — ED ADULT TRIAGE NOTE - CHIEF COMPLAINT QUOTE
c/o of multiple episodes of diarrhea (five today), very large, very foul smelling.  Endorses nausea/vomiting today with weakness and generalized malaise.  Denies fever/chills.  No recent antibiotic use, denies abdominal pain.

## 2017-09-07 NOTE — ED PROVIDER NOTE - MEDICAL DECISION MAKING DETAILS
84M with 5 episodes watery, foul smelling, NB diarrhea. Plan for cbc, cmp for electrolyte abnormalities and eval for leukocytosis. If has bowel movement in ED will send c.diff/stool ova/parasites. IVF. Pt well appearing, nontoxic, normal vital signs. Currently no nausea.

## 2017-09-07 NOTE — ED ADULT NURSE NOTE - OBJECTIVE STATEMENT
The patient is an 83 y/o female alert and oriented to person and place, presenting with daughter from home with a c/c of 5 episodes of diahrea since this AM, patient also had one episode of vomiting.  daughter denies patient having taken any antibiotics recently, denies any recent hospitalizations.  patient denies sob, cp, abd pain, abd soft non-tender non-distended x4.  patient denies pain and discomfort.  vss, respirations even an unlabored, lungs CTA BL, patient in nad, presently being seen by MD.

## 2017-09-07 NOTE — ED PROVIDER NOTE - OBJECTIVE STATEMENT
84M PMH HTN, DM (Diet controlled) p/w 5 episodes voluminous, watery diarrhea a/w nausea and 1 episode NBNB emesis today. also had 2 episodes of watery diarrhea 4 days ago, but resolved in interim. No fever/chills. No abd pain. No recent health care exposure, abx, hospitalizations. Diarrhea watery, nonbloody. No recent travel. Tolerating PO.

## 2017-09-07 NOTE — ED PROVIDER NOTE - PROGRESS NOTE DETAILS
Electrolytes normal, unable to provide stool sample in ED.  Will have pt f/u with PMD for stool testing. ross: pt comfortable.  abd exam s/nt/nd, = BS.  labs no lytes abnormality.  pt unable to have another BM.  provided pt with bedpan and specimen cup to collect at home.    Dx diarrhea.  f/u with pcp and gi.  left ambulatory.  return if unable to tolerate po or gi sx worsens

## 2017-12-19 ENCOUNTER — OUTPATIENT (OUTPATIENT)
Dept: OUTPATIENT SERVICES | Facility: HOSPITAL | Age: 82
LOS: 1 days | End: 2017-12-19
Payer: MEDICARE

## 2017-12-19 VITALS
HEART RATE: 75 BPM | TEMPERATURE: 98 F | HEIGHT: 68 IN | RESPIRATION RATE: 16 BRPM | SYSTOLIC BLOOD PRESSURE: 110 MMHG | WEIGHT: 188.05 LBS | DIASTOLIC BLOOD PRESSURE: 60 MMHG

## 2017-12-19 DIAGNOSIS — R22.2 LOCALIZED SWELLING, MASS AND LUMP, TRUNK: ICD-10-CM

## 2017-12-19 DIAGNOSIS — N40.0 BENIGN PROSTATIC HYPERPLASIA WITHOUT LOWER URINARY TRACT SYMPTOMS: Chronic | ICD-10-CM

## 2017-12-19 LAB
BUN SERPL-MCNC: 23 MG/DL — SIGNIFICANT CHANGE UP (ref 7–23)
CALCIUM SERPL-MCNC: 9.2 MG/DL — SIGNIFICANT CHANGE UP (ref 8.4–10.5)
CHLORIDE SERPL-SCNC: 104 MMOL/L — SIGNIFICANT CHANGE UP (ref 98–107)
CO2 SERPL-SCNC: 26 MMOL/L — SIGNIFICANT CHANGE UP (ref 22–31)
CREAT SERPL-MCNC: 1.4 MG/DL — HIGH (ref 0.5–1.3)
GLUCOSE SERPL-MCNC: 75 MG/DL — SIGNIFICANT CHANGE UP (ref 70–99)
HBA1C BLD-MCNC: 6.4 % — HIGH (ref 4–5.6)
HCT VFR BLD CALC: 42 % — SIGNIFICANT CHANGE UP (ref 39–50)
HGB BLD-MCNC: 13 G/DL — SIGNIFICANT CHANGE UP (ref 13–17)
MCHC RBC-ENTMCNC: 28.1 PG — SIGNIFICANT CHANGE UP (ref 27–34)
MCHC RBC-ENTMCNC: 31 % — LOW (ref 32–36)
MCV RBC AUTO: 90.7 FL — SIGNIFICANT CHANGE UP (ref 80–100)
NRBC # FLD: 0 — SIGNIFICANT CHANGE UP
PLATELET # BLD AUTO: 173 K/UL — SIGNIFICANT CHANGE UP (ref 150–400)
PMV BLD: 11.3 FL — SIGNIFICANT CHANGE UP (ref 7–13)
POTASSIUM SERPL-MCNC: 4.6 MMOL/L — SIGNIFICANT CHANGE UP (ref 3.5–5.3)
POTASSIUM SERPL-SCNC: 4.6 MMOL/L — SIGNIFICANT CHANGE UP (ref 3.5–5.3)
RBC # BLD: 4.63 M/UL — SIGNIFICANT CHANGE UP (ref 4.2–5.8)
RBC # FLD: 13.6 % — SIGNIFICANT CHANGE UP (ref 10.3–14.5)
SODIUM SERPL-SCNC: 144 MMOL/L — SIGNIFICANT CHANGE UP (ref 135–145)
WBC # BLD: 4.55 K/UL — SIGNIFICANT CHANGE UP (ref 3.8–10.5)
WBC # FLD AUTO: 4.55 K/UL — SIGNIFICANT CHANGE UP (ref 3.8–10.5)

## 2017-12-19 PROCEDURE — 93010 ELECTROCARDIOGRAM REPORT: CPT

## 2017-12-19 RX ORDER — VALSARTAN 80 MG/1
1 TABLET ORAL
Qty: 0 | Refills: 0 | COMMUNITY

## 2017-12-19 NOTE — H&P PST ADULT - HISTORY OF PRESENT ILLNESS
83 y/o  old Black male with HTN, Dyslipidemia, T2DM (meds Dc'd on 03/2017) BPH, Dementia   urinary frequency and incontinence presents for preop eval to have cystoscopy with green light laser ablation of prostate on 4/18/12. Pt stated that he is not able to make it to the restroom and has been leaking urine and so went to see Dr. Marquez and surgery was recommended    further evaluation mor on the right side of the back x 5-6 months. Pt was referred to surgeon for further evaluation 85 y/o Black male with HTN, CAD- stented coronary artery, Dyslipidemia, T2DM (meds Dc'd on 03/2017) BPH, Dementia presents to PST for pre op evaluation accompanied by daughter Fidelina stated that with hx of right side of the back growth x 5-6 months. Pt was referred to surgeon by PCP for further evaluation. Now scheduled for wide excision of the right posterior chest wall mass on 12/26/17

## 2017-12-19 NOTE — H&P PST ADULT - NEGATIVE GENERAL SYMPTOMS
no fever/no weight gain/no malaise/no chills/no sweating/no weight loss/no polyuria/no polydipsia/no fatigue

## 2017-12-19 NOTE — H&P PST ADULT - RS GEN PE MLT RESP DETAILS PC
clear to auscultation bilaterally/respirations non-labored/airway patent/breath sounds equal/no chest wall tenderness/good air movement/no intercostal retractions/no rales/no subcutaneous emphysema/no wheezes/no rhonchi

## 2017-12-19 NOTE — H&P PST ADULT - REASON FOR ADMISSION
" I am going to have prostate surgery" " He has a growth on the right side of the back", as per daughter Fidelina

## 2017-12-19 NOTE — H&P PST ADULT - PROBLEM SELECTOR PLAN 1
Scheduled for wide excision of the right posterior chest wall mass on 12/26/17. Pre op instructions, famotidine, chlorhexidine gluconate soap given and explained. Pt verbalized understanding.  Pending medical clearance

## 2017-12-19 NOTE — H&P PST ADULT - NEGATIVE CARDIOVASCULAR SYMPTOMS
no claudication/no dyspnea on exertion/no chest pain/no palpitations/no paroxysmal nocturnal dyspnea

## 2017-12-19 NOTE — H&P PST ADULT - PMH
Blood incompatibility with cadaveric donor    Cataract    Diabetes mellitus type II    Direct inguinal hernia    Glaucoma    H/O: HTN (hypertension)    H/O: osteoarthritis    History of BPH    History of short term memory loss    Incontinence of urine    Urinary urgency Blood incompatibility with cadaveric donor    CAD (coronary artery disease)    Cataract    Diabetes mellitus type II    Direct inguinal hernia    Glaucoma    H/O: HTN (hypertension)    H/O: osteoarthritis    History of BPH    History of short term memory loss    Incontinence of urine    Urinary urgency

## 2017-12-19 NOTE — H&P PST ADULT - NEGATIVE OPHTHALMOLOGIC SYMPTOMS
no discharge R/no pain L/no scleral injection R/no scleral injection L/no diplopia/no blurred vision L/no irritation R/no loss of vision L/no loss of vision R/no lacrimation R/no blurred vision R/no irritation L/no discharge L/no pain R/no photophobia/no lacrimation L

## 2017-12-19 NOTE — H&P PST ADULT - PSH
Direct inguinal hernia repair - left - 20 yrs ago    H/O arthroscopy of left knee - 2012    h/o bilat cataract repair -  3 yrs ago    H/O coronary angioplasty and stent x 1 - 5 yrs ago    sigmoid resection - 20 yrs ago Direct inguinal hernia repair - left - 20 yrs ago    H/O arthroscopy of left knee - 2012    h/o bilat cataract repair -  3 yrs ago    H/O coronary angioplasty and stent x 1 - 5 yrs ago    Prostate enlargement  S/P Green light laser ablation of prostate; 04/2012  sigmoid resection - 20 yrs ago

## 2017-12-19 NOTE — H&P PST ADULT - NEGATIVE BREAST SYMPTOMS
no breast tenderness L/no breast lump R/no nipple discharge L/no nipple discharge R/no breast tenderness R no breast tenderness L/no breast lump L/no breast lump R/no nipple discharge L/no nipple discharge R/no breast tenderness R

## 2017-12-19 NOTE — H&P PST ADULT - NEGATIVE ENMT SYMPTOMS
no tinnitus/no nasal discharge/no gum bleeding/no hearing difficulty/no nasal congestion/no sinus symptoms/no abnormal taste sensation/no nasal obstruction/no ear pain/no vertigo/no nose bleeds

## 2017-12-26 ENCOUNTER — RESULT REVIEW (OUTPATIENT)
Age: 82
End: 2017-12-26

## 2017-12-26 ENCOUNTER — OUTPATIENT (OUTPATIENT)
Dept: OUTPATIENT SERVICES | Facility: HOSPITAL | Age: 82
LOS: 1 days | Discharge: HOME | End: 2017-12-26
Payer: MEDICARE

## 2017-12-26 ENCOUNTER — TRANSCRIPTION ENCOUNTER (OUTPATIENT)
Age: 82
End: 2017-12-26

## 2017-12-26 VITALS
DIASTOLIC BLOOD PRESSURE: 70 MMHG | RESPIRATION RATE: 13 BRPM | SYSTOLIC BLOOD PRESSURE: 117 MMHG | HEART RATE: 98 BPM | OXYGEN SATURATION: 100 % | TEMPERATURE: 98 F

## 2017-12-26 VITALS
DIASTOLIC BLOOD PRESSURE: 69 MMHG | OXYGEN SATURATION: 96 % | RESPIRATION RATE: 14 BRPM | SYSTOLIC BLOOD PRESSURE: 155 MMHG | HEART RATE: 77 BPM | HEIGHT: 68 IN | WEIGHT: 188.05 LBS | TEMPERATURE: 98 F

## 2017-12-26 DIAGNOSIS — R22.2 LOCALIZED SWELLING, MASS AND LUMP, TRUNK: ICD-10-CM

## 2017-12-26 DIAGNOSIS — N40.0 BENIGN PROSTATIC HYPERPLASIA WITHOUT LOWER URINARY TRACT SYMPTOMS: Chronic | ICD-10-CM

## 2017-12-26 LAB — GLUCOSE BLDC GLUCOMTR-MCNC: 87 MG/DL — SIGNIFICANT CHANGE UP (ref 70–99)

## 2017-12-26 PROCEDURE — 88341 IMHCHEM/IMCYTCHM EA ADD ANTB: CPT | Mod: 26

## 2017-12-26 PROCEDURE — 88342 IMHCHEM/IMCYTCHM 1ST ANTB: CPT | Mod: 26

## 2017-12-26 PROCEDURE — 88309 TISSUE EXAM BY PATHOLOGIST: CPT | Mod: 26

## 2017-12-26 RX ORDER — SODIUM CHLORIDE 9 MG/ML
1000 INJECTION, SOLUTION INTRAVENOUS
Qty: 0 | Refills: 0 | Status: DISCONTINUED | OUTPATIENT
Start: 2017-12-26 | End: 2017-12-27

## 2017-12-26 RX ORDER — OXYCODONE AND ACETAMINOPHEN 5; 325 MG/1; MG/1
1 TABLET ORAL EVERY 6 HOURS
Qty: 0 | Refills: 0 | Status: DISCONTINUED | OUTPATIENT
Start: 2017-12-26 | End: 2017-12-27

## 2017-12-26 RX ORDER — SODIUM CHLORIDE 9 MG/ML
3 INJECTION INTRAMUSCULAR; INTRAVENOUS; SUBCUTANEOUS ONCE
Qty: 0 | Refills: 0 | Status: DISCONTINUED | OUTPATIENT
Start: 2017-12-26 | End: 2017-12-27

## 2017-12-26 RX ORDER — OXYCODONE AND ACETAMINOPHEN 5; 325 MG/1; MG/1
2 TABLET ORAL EVERY 6 HOURS
Qty: 0 | Refills: 0 | Status: DISCONTINUED | OUTPATIENT
Start: 2017-12-26 | End: 2017-12-27

## 2017-12-26 RX ADMIN — SODIUM CHLORIDE 75 MILLILITER(S): 9 INJECTION, SOLUTION INTRAVENOUS at 14:22

## 2017-12-26 NOTE — ASU DISCHARGE PLAN (ADULT/PEDIATRIC). - MEDICATION SUMMARY - MEDICATIONS TO TAKE
I will START or STAY ON the medications listed below when I get home from the hospital:    aspirin 81 mg oral tablet  -- 1 tab(s) by mouth once a day, am  -- Indication: For Home medication    oxyCODONE-acetaminophen 5 mg-325 mg oral tablet  -- 1 tab(s) by mouth every 6 hours, As needed, Moderate Pain (4 - 6)  -- Indication: For Post-operative pain control    oxyCODONE-acetaminophen 5 mg-325 mg oral tablet  -- 2 tab(s) by mouth every 6 hours, As needed, Severe Pain (7 - 10)  -- Indication: For Post-operative pain control    valsartan 160 mg oral capsule  -- orally 2 times a day  -- Indication: For Home medication    Flomax 0.4 mg oral capsule  -- 1 cap(s) by mouth once a day, am  -- Indication: For Home medication    atorvastatin 20 mg oral tablet  -- 1 tab(s) by mouth once a day, am  -- Indication: For Home medication    Norvasc 10 mg oral tablet  -- 1 tab(s) by mouth once a day, am  -- Indication: For Home medication    Namenda 10 mg oral tablet  -- orally once a day, am  -- Indication: For Home medication I will START or STAY ON the medications listed below when I get home from the hospital:    aspirin 81 mg oral tablet  -- 1 tab(s) by mouth once a day, am  -- Indication: For Home medication    oxyCODONE-acetaminophen 5 mg-325 mg oral tablet  -- 2 tab(s) by mouth every 6 hours, As needed, Severe Pain (7 - 10)  -- Indication: For Post-operative pain control    oxyCODONE-acetaminophen 5 mg-325 mg oral tablet  -- 1 tab by mouth every 6 hours, As needed, Moderate Pain (4 - 6) or 2 tabs every 6 hours for Severe pain MDD:6  -- Indication: For moderate to severe pain    valsartan 160 mg oral capsule  -- orally 2 times a day  -- Indication: For Home medication    Flomax 0.4 mg oral capsule  -- 1 cap(s) by mouth once a day, am  -- Indication: For Home medication    atorvastatin 20 mg oral tablet  -- 1 tab(s) by mouth once a day, am  -- Indication: For Home medication    Norvasc 10 mg oral tablet  -- 1 tab(s) by mouth once a day, am  -- Indication: For Home medication    Namenda 10 mg oral tablet  -- orally once a day, am  -- Indication: For Home medication

## 2017-12-26 NOTE — ASU DISCHARGE PLAN (ADULT/PEDIATRIC). - SPECIAL INSTRUCTIONS
A drain was placed during the surgery. It runs beneath the incision, and drains into the bulb at your side. This bulb will periodically need to be emptied - you do this by opening the vent (pull the cap back)and disconnecting it from the tubing (grasp the tubing to secure it, pull bulb off), after which you can squeeze out the drainage to dispose of it. Then, while you still have the bulb collapsed in your hand, re-attach it to the tubing (by pushing the tubing over the bulb's emptying port) and putting the vent cap back on. Please keep a record of how much fluid comes out each day (there are markings on the side of the bulb to estimate the volume), and what color/consistency it is. Bring this report with you when you come to your follow-up appointment.

## 2017-12-26 NOTE — ASU DISCHARGE PLAN (ADULT/PEDIATRIC). - NOTIFY
Swelling that continues/Bleeding that does not stop/Numbness, color, or temperature change to extremity/Pain not relieved by Medications/Fever greater than 101/Numbness, tingling

## 2017-12-26 NOTE — ASU DISCHARGE PLAN (ADULT/PEDIATRIC). - NURSING INSTRUCTIONS
You were given IV Tylenol for pain management.  Please DO NOT take tylenol or products that contain tylenol for the next 6-8 hours (until _5pm______ ). Please do not exceed 3000mg in 24hours.  \

## 2017-12-26 NOTE — BRIEF OPERATIVE NOTE - OPERATION/FINDINGS
Large (approximately 9 x 7 cm), mobile subcutaneous mass, with the consistency of firm rubber, of right posterior chest wall beginning at right posterior axillary line and extending back. Patient positioned left lateral decubitus. Ellipse of skin overlying mass incised, incision carried down and around tumor to the level of the latissimus dorsi fascia. The fascia was dissected off the of underlying muscle, and remained en block with the specimen. No violation of the tumor capsule. Hemostasis achieved with electrocautery and Ligasure. González-Cohen drain left in place in the subcutaneous space.

## 2017-12-26 NOTE — BRIEF OPERATIVE NOTE - PROCEDURE
<<-----Click on this checkbox to enter Procedure Excision of mass of right chest wall  12/26/2017  posterior  Active  KGENSER

## 2017-12-29 LAB — SURGICAL PATHOLOGY STUDY: SIGNIFICANT CHANGE UP

## 2018-01-22 ENCOUNTER — OUTPATIENT (OUTPATIENT)
Dept: OUTPATIENT SERVICES | Facility: HOSPITAL | Age: 83
LOS: 1 days | Discharge: HOME | End: 2018-01-22

## 2018-01-22 DIAGNOSIS — N40.0 BENIGN PROSTATIC HYPERPLASIA WITHOUT LOWER URINARY TRACT SYMPTOMS: Chronic | ICD-10-CM

## 2018-01-30 ENCOUNTER — APPOINTMENT (OUTPATIENT)
Dept: RADIATION ONCOLOGY | Facility: CLINIC | Age: 83
End: 2018-01-30
Payer: MEDICARE

## 2018-01-30 VITALS
TEMPERATURE: 98.1 F | SYSTOLIC BLOOD PRESSURE: 157 MMHG | WEIGHT: 185 LBS | RESPIRATION RATE: 16 BRPM | DIASTOLIC BLOOD PRESSURE: 61 MMHG | HEART RATE: 80 BPM | OXYGEN SATURATION: 92 % | HEIGHT: 66 IN | BODY MASS INDEX: 29.73 KG/M2

## 2018-01-30 DIAGNOSIS — Z80.2 FAMILY HISTORY OF MALIGNANT NEOPLASM OF OTHER RESPIRATORY AND INTRATHORACIC ORGANS: ICD-10-CM

## 2018-01-30 DIAGNOSIS — I10 ESSENTIAL (PRIMARY) HYPERTENSION: ICD-10-CM

## 2018-01-30 DIAGNOSIS — Z86.59 PERSONAL HISTORY OF OTHER MENTAL AND BEHAVIORAL DISORDERS: ICD-10-CM

## 2018-01-30 DIAGNOSIS — Z87.438 PERSONAL HISTORY OF OTHER DISEASES OF MALE GENITAL ORGANS: ICD-10-CM

## 2018-01-30 DIAGNOSIS — E78.5 HYPERLIPIDEMIA, UNSPECIFIED: ICD-10-CM

## 2018-01-30 PROCEDURE — 99203 OFFICE O/P NEW LOW 30 MIN: CPT | Mod: 25,GC

## 2018-01-30 RX ORDER — ATORVASTATIN CALCIUM 80 MG/1
TABLET, FILM COATED ORAL
Refills: 0 | Status: ACTIVE | COMMUNITY

## 2018-01-30 RX ORDER — ASPIRIN 325 MG
TABLET ORAL
Refills: 0 | Status: ACTIVE | COMMUNITY

## 2018-02-01 PROCEDURE — 77263 THER RADIOLOGY TX PLNG CPLX: CPT

## 2018-03-09 PROCEDURE — 77295 3-D RADIOTHERAPY PLAN: CPT | Mod: 26

## 2018-03-09 PROCEDURE — 77300 RADIATION THERAPY DOSE PLAN: CPT | Mod: 26

## 2018-03-09 PROCEDURE — 77334 RADIATION TREATMENT AID(S): CPT | Mod: 26

## 2018-03-22 PROCEDURE — 77280 THER RAD SIMULAJ FIELD SMPL: CPT | Mod: 26

## 2018-03-30 PROCEDURE — 77427 RADIATION TX MANAGEMENT X5: CPT

## 2018-04-03 VITALS
DIASTOLIC BLOOD PRESSURE: 71 MMHG | HEART RATE: 74 BPM | HEIGHT: 66 IN | RESPIRATION RATE: 16 BRPM | SYSTOLIC BLOOD PRESSURE: 123 MMHG

## 2018-04-06 PROCEDURE — 77427 RADIATION TX MANAGEMENT X5: CPT

## 2018-04-09 VITALS
RESPIRATION RATE: 14 BRPM | SYSTOLIC BLOOD PRESSURE: 127 MMHG | TEMPERATURE: 97.88 F | DIASTOLIC BLOOD PRESSURE: 68 MMHG | OXYGEN SATURATION: 99 % | HEART RATE: 57 BPM

## 2018-04-13 PROCEDURE — 77427 RADIATION TX MANAGEMENT X5: CPT

## 2018-04-16 ENCOUNTER — CHART COPY (OUTPATIENT)
Age: 83
End: 2018-04-16

## 2018-04-16 VITALS
DIASTOLIC BLOOD PRESSURE: 70 MMHG | RESPIRATION RATE: 14 BRPM | TEMPERATURE: 98.42 F | SYSTOLIC BLOOD PRESSURE: 133 MMHG | HEART RATE: 64 BPM

## 2018-04-16 VITALS — OXYGEN SATURATION: 96 %

## 2018-04-19 PROCEDURE — 77280 THER RAD SIMULAJ FIELD SMPL: CPT | Mod: 26

## 2018-04-20 PROCEDURE — 77427 RADIATION TX MANAGEMENT X5: CPT

## 2018-04-27 PROCEDURE — 77427 RADIATION TX MANAGEMENT X5: CPT

## 2018-04-30 VITALS
SYSTOLIC BLOOD PRESSURE: 144 MMHG | DIASTOLIC BLOOD PRESSURE: 68 MMHG | OXYGEN SATURATION: 98 % | TEMPERATURE: 98.24 F | HEART RATE: 63 BPM | RESPIRATION RATE: 14 BRPM

## 2018-05-07 PROCEDURE — 77427 RADIATION TX MANAGEMENT X5: CPT

## 2018-05-08 VITALS
DIASTOLIC BLOOD PRESSURE: 63 MMHG | HEART RATE: 66 BPM | TEMPERATURE: 98.2 F | SYSTOLIC BLOOD PRESSURE: 158 MMHG | RESPIRATION RATE: 15 BRPM | OXYGEN SATURATION: 95 %

## 2018-05-10 PROCEDURE — 77427 RADIATION TX MANAGEMENT X5: CPT

## 2018-06-25 ENCOUNTER — APPOINTMENT (OUTPATIENT)
Dept: RADIATION ONCOLOGY | Facility: CLINIC | Age: 83
End: 2018-06-25
Payer: MEDICARE

## 2018-06-25 VITALS
BODY MASS INDEX: 32.15 KG/M2 | HEIGHT: 66 IN | TEMPERATURE: 98.2 F | WEIGHT: 200.07 LBS | DIASTOLIC BLOOD PRESSURE: 77 MMHG | SYSTOLIC BLOOD PRESSURE: 154 MMHG | HEART RATE: 65 BPM | OXYGEN SATURATION: 98 % | RESPIRATION RATE: 15 BRPM

## 2018-06-25 PROCEDURE — 99214 OFFICE O/P EST MOD 30 MIN: CPT

## 2018-08-10 PROBLEM — I25.10 ATHEROSCLEROTIC HEART DISEASE OF NATIVE CORONARY ARTERY WITHOUT ANGINA PECTORIS: Chronic | Status: ACTIVE | Noted: 2017-12-19

## 2018-08-24 ENCOUNTER — APPOINTMENT (OUTPATIENT)
Dept: CT IMAGING | Facility: IMAGING CENTER | Age: 83
End: 2018-08-24
Payer: MEDICARE

## 2018-08-24 ENCOUNTER — OUTPATIENT (OUTPATIENT)
Dept: OUTPATIENT SERVICES | Facility: HOSPITAL | Age: 83
LOS: 1 days | End: 2018-08-24
Payer: COMMERCIAL

## 2018-08-24 DIAGNOSIS — Z00.8 ENCOUNTER FOR OTHER GENERAL EXAMINATION: ICD-10-CM

## 2018-08-24 DIAGNOSIS — N40.0 BENIGN PROSTATIC HYPERPLASIA WITHOUT LOWER URINARY TRACT SYMPTOMS: Chronic | ICD-10-CM

## 2018-08-24 PROCEDURE — 71260 CT THORAX DX C+: CPT | Mod: 26

## 2018-08-24 PROCEDURE — 71260 CT THORAX DX C+: CPT

## 2018-08-24 PROCEDURE — 82565 ASSAY OF CREATININE: CPT

## 2018-12-18 ENCOUNTER — APPOINTMENT (OUTPATIENT)
Dept: RADIATION ONCOLOGY | Facility: CLINIC | Age: 83
End: 2018-12-18
Payer: MEDICARE

## 2018-12-18 VITALS
SYSTOLIC BLOOD PRESSURE: 148 MMHG | HEART RATE: 58 BPM | RESPIRATION RATE: 16 BRPM | DIASTOLIC BLOOD PRESSURE: 67 MMHG | BODY MASS INDEX: 31.86 KG/M2 | OXYGEN SATURATION: 94 % | WEIGHT: 197.42 LBS

## 2018-12-18 PROCEDURE — 99213 OFFICE O/P EST LOW 20 MIN: CPT | Mod: GC

## 2018-12-18 NOTE — VITALS
[Maximal Pain Intensity: 3/10] : 3/10 [Least Pain Intensity: 1/10] : 1/10 [Pain Description/Quality: ___] : Pain description/quality: [unfilled] [Pain Duration: ___] : Pain duration: [unfilled] [Pain Location: ___] : Pain Location: [unfilled] [Pain Interferes with ADLs] : Pain interferes with activities of daily living. [OTC] : OTC

## 2018-12-18 NOTE — REVIEW OF SYSTEMS
[Negative] : Allergic/Immunologic [Cough: Grade 0] : Cough: Grade 0 [Dyspnea: Grade 0] : Dyspnea: Grade 0 [Hiccups: Grade 0] : Hiccups: Grade 0 [Hoarseness: Grade 0] : Hoarseness: Grade 0 [Hypoxia: Grade 0] : Hypoxia: Grade 0 [Pharyngeal Mucositis: Grade 0] : Pharyngeal Mucositis: Grade 0 [Pneumonitis: Grade 0] : Pneumonitis: Grade 0 [Voice Alteration: Grade 0] : Voice Alteration: Grade 0 [Alopecia: Grade 0] : Alopecia: Grade 0 [Pruritus: Grade 0] : Pruritus: Grade 0 [Skin Atrophy: Grade 0] : Skin Atrophy: Grade 0 [Skin Hyperpigmentation: Grade 0] : Skin Hyperpigmentation: Grade 0 [Skin Induration: Grade 0] : Skin Induration: Grade 0 [Dermatitis Radiation: Grade 0] : Dermatitis Radiation: Grade 0

## 2019-01-09 ENCOUNTER — FORM ENCOUNTER (OUTPATIENT)
Age: 84
End: 2019-01-09

## 2019-01-10 ENCOUNTER — APPOINTMENT (OUTPATIENT)
Dept: CT IMAGING | Facility: IMAGING CENTER | Age: 84
End: 2019-01-10
Payer: MEDICARE

## 2019-01-10 ENCOUNTER — OUTPATIENT (OUTPATIENT)
Dept: OUTPATIENT SERVICES | Facility: HOSPITAL | Age: 84
LOS: 1 days | End: 2019-01-10
Payer: MEDICARE

## 2019-01-10 DIAGNOSIS — C49.9 MALIGNANT NEOPLASM OF CONNECTIVE AND SOFT TISSUE, UNSPECIFIED: ICD-10-CM

## 2019-01-10 DIAGNOSIS — N40.0 BENIGN PROSTATIC HYPERPLASIA WITHOUT LOWER URINARY TRACT SYMPTOMS: Chronic | ICD-10-CM

## 2019-01-10 PROCEDURE — 71260 CT THORAX DX C+: CPT

## 2019-01-10 PROCEDURE — 71260 CT THORAX DX C+: CPT | Mod: 26

## 2019-01-10 PROCEDURE — 82565 ASSAY OF CREATININE: CPT

## 2019-01-10 NOTE — PHYSICAL THERAPY INITIAL EVALUATION ADULT - MUSCLE TONE ASSESSMENT, REHAB EVAL
A-fib    Aortic stenosis    CAD (coronary artery disease)    CHF (congestive heart failure)    CKD (chronic kidney disease)    HLD (hyperlipidemia)    HTN (hypertension)    Thrombocytopenia
normal

## 2019-01-29 ENCOUNTER — APPOINTMENT (OUTPATIENT)
Dept: THORACIC SURGERY | Facility: CLINIC | Age: 84
End: 2019-01-29
Payer: MEDICARE

## 2019-01-29 VITALS
WEIGHT: 194 LBS | HEIGHT: 65 IN | BODY MASS INDEX: 32.32 KG/M2 | HEART RATE: 70 BPM | RESPIRATION RATE: 16 BRPM | OXYGEN SATURATION: 99 % | SYSTOLIC BLOOD PRESSURE: 130 MMHG | DIASTOLIC BLOOD PRESSURE: 63 MMHG

## 2019-01-29 DIAGNOSIS — Z82.3 FAMILY HISTORY OF STROKE: ICD-10-CM

## 2019-01-29 DIAGNOSIS — Z87.891 PERSONAL HISTORY OF NICOTINE DEPENDENCE: ICD-10-CM

## 2019-01-29 DIAGNOSIS — Z83.3 FAMILY HISTORY OF DIABETES MELLITUS: ICD-10-CM

## 2019-01-29 DIAGNOSIS — E11.9 TYPE 2 DIABETES MELLITUS W/OUT COMPLICATIONS: ICD-10-CM

## 2019-01-29 DIAGNOSIS — Z82.49 FAMILY HISTORY OF ISCHEMIC HEART DISEASE AND OTHER DISEASES OF THE CIRCULATORY SYSTEM: ICD-10-CM

## 2019-01-29 PROCEDURE — 99205 OFFICE O/P NEW HI 60 MIN: CPT

## 2019-01-31 PROBLEM — Z87.891 FORMER SMOKER: Status: ACTIVE | Noted: 2019-01-31

## 2019-01-31 PROBLEM — Z82.3 FAMILY HISTORY OF CEREBROVASCULAR ACCIDENT (CVA): Status: ACTIVE | Noted: 2019-01-31

## 2019-01-31 PROBLEM — Z83.3 FAMILY HISTORY OF TYPE 2 DIABETES MELLITUS: Status: ACTIVE | Noted: 2019-01-31

## 2019-01-31 PROBLEM — E11.9 DIABETES MELLITUS: Status: RESOLVED | Noted: 2018-01-30 | Resolved: 2019-01-31

## 2019-01-31 PROBLEM — Z82.3 FAMILY HISTORY OF INTRACRANIAL HEMORRHAGE: Status: ACTIVE | Noted: 2019-01-31

## 2019-01-31 PROBLEM — Z82.49 FAMILY HISTORY OF HYPERTENSION: Status: ACTIVE | Noted: 2019-01-31

## 2019-01-31 RX ORDER — VALSARTAN 160 MG/1
160 TABLET ORAL
Refills: 0 | Status: ACTIVE | COMMUNITY

## 2019-01-31 RX ORDER — FOLIC ACID 1 MG/1
1 TABLET ORAL
Refills: 0 | Status: ACTIVE | COMMUNITY

## 2019-01-31 RX ORDER — TAMSULOSIN HYDROCHLORIDE 0.4 MG/1
0.4 CAPSULE ORAL
Refills: 0 | Status: ACTIVE | COMMUNITY

## 2019-01-31 RX ORDER — AMLODIPINE BESYLATE 10 MG/1
10 TABLET ORAL DAILY
Refills: 0 | Status: ACTIVE | COMMUNITY

## 2019-01-31 RX ORDER — MEMANTINE HYDROCHLORIDE 28 MG/1
CAPSULE, EXTENDED RELEASE ORAL
Refills: 0 | Status: ACTIVE | COMMUNITY

## 2019-02-01 NOTE — CONSULT LETTER
[Dear  ___] : Dear  [unfilled], [Please see my note below.] : Please see my note below. [Sincerely,] : Sincerely, [Consult Letter:] : I had the pleasure of evaluating your patient, [unfilled]. [( Thank you for referring [unfilled] for consultation for _____ )] : Thank you for referring [unfilled] for consultation for [unfilled] [DrTree  ___] : Dr. HOWELL [DrTree ___] : Dr. HOWELL [FreeTextEntry2] : Mak Marquez MD (Hem/Onc)\par Greg Renteria MD (PCP)\par Dr. Del Rio (Cardiology) [FreeTextEntry3] : Crispin Heredia MD, MPH \par System Director of Thoracic Surgery \par Director of Comprehensive Lung and Foregut Retsof \par Professor Cardiovascular & Thoracic Surgery  \par [facility] [employer] [facility]\par

## 2019-02-01 NOTE — ASSESSMENT
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 He is referred by oncologist Dr. Mak Marquez for surgical consultation of EMANUEL mass. \par \par CT chest on 1/10/19: \par - 3.1 cm EMANUEL mass increased in size when compared to previous exam, represents metastasis. \par \par I have reviewed the patient's medical records and diagnostic images at time of this office consultation and have made the following recommendation:\par 1. I reviewed the CT scan w/ the patient and recommended Lt VATS EMANUEL wedge rxn MLND on 2/14/19. Risks and benefits and alternatives explained to patient, all questions answered, patient agreed to proceed with surgery.\par 2. PFTs at 410\par 3. PET/CT\par 4. Medical clearance and Cardiac Clearance, PST\par \par \par Written by Rebel Ferguson NP, acting as a scribe for Dr. Crispin Heredia.\par \par The documentation recorded by the scribe accurately reflects the service I personally performed and the decisions made by me. CRISPIN HEREDIA MD\par

## 2019-02-01 NOTE — HISTORY OF PRESENT ILLNESS
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 He is referred by oncologist Dr. Mak Marquez for surgical consultation of EMANUEL mass. \par \par CT chest on 1/10/19: \par - 3.1 cm EMANUEL mass increased in size when compared to previous exam, represents metastasis. \par \par Pt is here today for CT Sx consultation for enlarging lung mass. Pt admits to SOB on mild exertion, productive cough, but denies any recent weight loss.

## 2019-02-01 NOTE — DATA REVIEWED
[FreeTextEntry1] : CT chest on 1/10/19: \par - 3.1 cm EMANUEL mass increased in size when compared to previous exam, represents metastasis. \par

## 2019-02-07 ENCOUNTER — OUTPATIENT (OUTPATIENT)
Dept: OUTPATIENT SERVICES | Facility: HOSPITAL | Age: 84
LOS: 1 days | End: 2019-02-07
Payer: MEDICARE

## 2019-02-07 VITALS
SYSTOLIC BLOOD PRESSURE: 134 MMHG | HEIGHT: 65 IN | WEIGHT: 194.01 LBS | DIASTOLIC BLOOD PRESSURE: 62 MMHG | TEMPERATURE: 97 F | HEART RATE: 58 BPM | OXYGEN SATURATION: 96 % | RESPIRATION RATE: 16 BRPM

## 2019-02-07 DIAGNOSIS — R09.89 OTHER SPECIFIED SYMPTOMS AND SIGNS INVOLVING THE CIRCULATORY AND RESPIRATORY SYSTEMS: ICD-10-CM

## 2019-02-07 DIAGNOSIS — Z86.79 PERSONAL HISTORY OF OTHER DISEASES OF THE CIRCULATORY SYSTEM: ICD-10-CM

## 2019-02-07 DIAGNOSIS — Z85.831 PERSONAL HISTORY OF MALIGNANT NEOPLASM OF SOFT TISSUE: Chronic | ICD-10-CM

## 2019-02-07 DIAGNOSIS — R91.8 OTHER NONSPECIFIC ABNORMAL FINDING OF LUNG FIELD: ICD-10-CM

## 2019-02-07 DIAGNOSIS — I25.10 ATHEROSCLEROTIC HEART DISEASE OF NATIVE CORONARY ARTERY WITHOUT ANGINA PECTORIS: ICD-10-CM

## 2019-02-07 DIAGNOSIS — T82.855A STENOSIS OF CORONARY ARTERY STENT, INITIAL ENCOUNTER: Chronic | ICD-10-CM

## 2019-02-07 DIAGNOSIS — N40.0 BENIGN PROSTATIC HYPERPLASIA WITHOUT LOWER URINARY TRACT SYMPTOMS: Chronic | ICD-10-CM

## 2019-02-07 LAB
ANION GAP SERPL CALC-SCNC: 9 MMO/L — SIGNIFICANT CHANGE UP (ref 7–14)
BLD GP AB SCN SERPL QL: NEGATIVE — SIGNIFICANT CHANGE UP
BUN SERPL-MCNC: 19 MG/DL — SIGNIFICANT CHANGE UP (ref 7–23)
CALCIUM SERPL-MCNC: 9.4 MG/DL — SIGNIFICANT CHANGE UP (ref 8.4–10.5)
CHLORIDE SERPL-SCNC: 104 MMOL/L — SIGNIFICANT CHANGE UP (ref 98–107)
CO2 SERPL-SCNC: 27 MMOL/L — SIGNIFICANT CHANGE UP (ref 22–31)
CREAT SERPL-MCNC: 1.5 MG/DL — HIGH (ref 0.5–1.3)
GLUCOSE SERPL-MCNC: 101 MG/DL — HIGH (ref 70–99)
HBA1C BLD-MCNC: 6 % — HIGH (ref 4–5.6)
HCT VFR BLD CALC: 43.1 % — SIGNIFICANT CHANGE UP (ref 39–50)
HGB BLD-MCNC: 13.1 G/DL — SIGNIFICANT CHANGE UP (ref 13–17)
MCHC RBC-ENTMCNC: 28.6 PG — SIGNIFICANT CHANGE UP (ref 27–34)
MCHC RBC-ENTMCNC: 30.4 % — LOW (ref 32–36)
MCV RBC AUTO: 94.1 FL — SIGNIFICANT CHANGE UP (ref 80–100)
NRBC # FLD: 0 K/UL — LOW (ref 25–125)
PLATELET # BLD AUTO: 176 K/UL — SIGNIFICANT CHANGE UP (ref 150–400)
PMV BLD: 11.3 FL — SIGNIFICANT CHANGE UP (ref 7–13)
POTASSIUM SERPL-MCNC: 4.7 MMOL/L — SIGNIFICANT CHANGE UP (ref 3.5–5.3)
POTASSIUM SERPL-SCNC: 4.7 MMOL/L — SIGNIFICANT CHANGE UP (ref 3.5–5.3)
RBC # BLD: 4.58 M/UL — SIGNIFICANT CHANGE UP (ref 4.2–5.8)
RBC # FLD: 13.2 % — SIGNIFICANT CHANGE UP (ref 10.3–14.5)
RH IG SCN BLD-IMP: POSITIVE — SIGNIFICANT CHANGE UP
SODIUM SERPL-SCNC: 140 MMOL/L — SIGNIFICANT CHANGE UP (ref 135–145)
WBC # BLD: 4.94 K/UL — SIGNIFICANT CHANGE UP (ref 3.8–10.5)
WBC # FLD AUTO: 4.94 K/UL — SIGNIFICANT CHANGE UP (ref 3.8–10.5)

## 2019-02-07 PROCEDURE — 93010 ELECTROCARDIOGRAM REPORT: CPT

## 2019-02-07 RX ORDER — VALSARTAN 80 MG/1
0 TABLET ORAL
Qty: 0 | Refills: 0 | COMMUNITY

## 2019-02-07 RX ORDER — MEMANTINE HYDROCHLORIDE 10 MG/1
1 TABLET ORAL
Qty: 0 | Refills: 0 | COMMUNITY

## 2019-02-07 RX ORDER — SODIUM CHLORIDE 9 MG/ML
1000 INJECTION, SOLUTION INTRAVENOUS
Qty: 0 | Refills: 0 | Status: DISCONTINUED | OUTPATIENT
Start: 2019-02-21 | End: 2019-02-22

## 2019-02-07 NOTE — H&P PST ADULT - NEGATIVE BREAST SYMPTOMS
no nipple discharge R/no breast tenderness R/no breast lump L/no nipple discharge L/no breast tenderness L/no breast lump R

## 2019-02-07 NOTE — H&P PST ADULT - PROBLEM SELECTOR PLAN 3
Pt instructed to continue aspirin therapy.  Pt is scheduled for medical and cardiac evaluation on 02/14/19.

## 2019-02-07 NOTE — H&P PST ADULT - RS GEN PE MLT RESP DETAILS PC
breath sounds equal/no rhonchi/no wheezes/respirations non-labored/no rales/clear to auscultation bilaterally

## 2019-02-07 NOTE — H&P PST ADULT - PMH
Blood incompatibility with cadaveric donor    CAD (coronary artery disease)    Cataract    Dementia    Diabetes mellitus type II    Direct inguinal hernia    Glaucoma    H/O: HTN (hypertension)    H/O: osteoarthritis    History of BPH    History of short term memory loss    Incontinence of urine    Sarcoma    Urinary urgency

## 2019-02-07 NOTE — H&P PST ADULT - PSH
Direct inguinal hernia repair - left - 20 yrs ago    H/O arthroscopy of left knee - 2012    h/o bilat cataract repair -  3 yrs ago    History of sarcoma  right chest wall - 2017  Prostate enlargement  S/P Green light laser ablation of prostate; 04/2012  Stenosis of coronary stent  x 3 last being 2011

## 2019-02-07 NOTE — H&P PST ADULT - FAMILY HISTORY
Family history of hypertension, mother, sister and brother     Child  Still living? Unknown  Family history of lymphoma, Age at diagnosis: Age Unknown     Mother  Still living? Unknown  Family history of diabetes mellitus, Age at diagnosis: Age Unknown     Sibling  Still living? No  Family history of diabetes mellitus, Age at diagnosis: Age Unknown  Family history of lymphoma, Age at diagnosis: Age Unknown

## 2019-02-07 NOTE — H&P PST ADULT - HISTORY OF PRESENT ILLNESS
85 y/o  male with HTN, CAD- stented coronary artery, Dyslipidemia, T2DM (meds Dc'd on 03/2017) BPH, Dementia presents to PST for pre op evaluation accompanied by daughter Fidelina stated that follow up ct scan showed left lung mass.  Pt is now scheduled for Left Video Assisted Thoracoscopy, Left Upper Lobe Wedge Resection, Mediastinal Lymph Node Dissection on 02/21/19.  Of note pt s/p wide excision of the right posterior chest wall mass on 12/26/17 and 36 rounds of radiation therapy last being March 2018.

## 2019-02-07 NOTE — H&P PST ADULT - LYMPHATIC
anterior cervical L/posterior cervical L/posterior cervical R/supraclavicular R/anterior cervical R/supraclavicular L

## 2019-02-07 NOTE — H&P PST ADULT - GASTROINTESTINAL DETAILS
bowel sounds normal/no rebound tenderness/no rigidity/no organomegaly/no distention/no bruit/no guarding/no masses palpable/soft/nontender

## 2019-02-07 NOTE — H&P PST ADULT - PROBLEM SELECTOR PLAN 1
Scheduled for Left Video Assisted Thoracoscopy, Left Upper Lobe Wedge Resection, Mediastinal Lymph Node Dissection on 02/21/19.  Lab results pending.  Preop, famotidine and chlorhexidine instructions provided to pt's daughter and questions addressed.

## 2019-02-14 ENCOUNTER — FORM ENCOUNTER (OUTPATIENT)
Age: 84
End: 2019-02-14

## 2019-02-15 ENCOUNTER — APPOINTMENT (OUTPATIENT)
Dept: NUCLEAR MEDICINE | Facility: IMAGING CENTER | Age: 84
End: 2019-02-15
Payer: MEDICARE

## 2019-02-15 ENCOUNTER — OUTPATIENT (OUTPATIENT)
Dept: OUTPATIENT SERVICES | Facility: HOSPITAL | Age: 84
LOS: 1 days | End: 2019-02-15
Payer: MEDICARE

## 2019-02-15 ENCOUNTER — APPOINTMENT (OUTPATIENT)
Dept: PULMONOLOGY | Facility: CLINIC | Age: 84
End: 2019-02-15
Payer: MEDICARE

## 2019-02-15 DIAGNOSIS — R91.8 OTHER NONSPECIFIC ABNORMAL FINDING OF LUNG FIELD: ICD-10-CM

## 2019-02-15 DIAGNOSIS — Z85.831 PERSONAL HISTORY OF MALIGNANT NEOPLASM OF SOFT TISSUE: Chronic | ICD-10-CM

## 2019-02-15 DIAGNOSIS — N40.0 BENIGN PROSTATIC HYPERPLASIA WITHOUT LOWER URINARY TRACT SYMPTOMS: Chronic | ICD-10-CM

## 2019-02-15 DIAGNOSIS — T82.855A STENOSIS OF CORONARY ARTERY STENT, INITIAL ENCOUNTER: Chronic | ICD-10-CM

## 2019-02-15 PROBLEM — F03.90 UNSPECIFIED DEMENTIA WITHOUT BEHAVIORAL DISTURBANCE: Chronic | Status: ACTIVE | Noted: 2019-02-07

## 2019-02-15 PROBLEM — C49.9 MALIGNANT NEOPLASM OF CONNECTIVE AND SOFT TISSUE, UNSPECIFIED: Chronic | Status: ACTIVE | Noted: 2019-02-07

## 2019-02-15 PROCEDURE — A9552: CPT

## 2019-02-15 PROCEDURE — 78815 PET IMAGE W/CT SKULL-THIGH: CPT | Mod: 26,PI

## 2019-02-15 PROCEDURE — 94729 DIFFUSING CAPACITY: CPT

## 2019-02-15 PROCEDURE — 94060 EVALUATION OF WHEEZING: CPT

## 2019-02-15 PROCEDURE — 94726 PLETHYSMOGRAPHY LUNG VOLUMES: CPT

## 2019-02-15 PROCEDURE — 78815 PET IMAGE W/CT SKULL-THIGH: CPT

## 2019-02-21 ENCOUNTER — RESULT REVIEW (OUTPATIENT)
Age: 84
End: 2019-02-21

## 2019-02-21 ENCOUNTER — INPATIENT (INPATIENT)
Facility: HOSPITAL | Age: 84
LOS: 1 days | Discharge: HOME HEALTH | End: 2019-02-23
Attending: THORACIC SURGERY (CARDIOTHORACIC VASCULAR SURGERY) | Admitting: THORACIC SURGERY (CARDIOTHORACIC VASCULAR SURGERY)
Payer: MEDICARE

## 2019-02-21 ENCOUNTER — APPOINTMENT (OUTPATIENT)
Dept: THORACIC SURGERY | Facility: HOSPITAL | Age: 84
End: 2019-02-21

## 2019-02-21 VITALS
DIASTOLIC BLOOD PRESSURE: 68 MMHG | SYSTOLIC BLOOD PRESSURE: 140 MMHG | HEART RATE: 76 BPM | WEIGHT: 194.01 LBS | RESPIRATION RATE: 16 BRPM | HEIGHT: 65 IN | TEMPERATURE: 98 F | OXYGEN SATURATION: 95 %

## 2019-02-21 DIAGNOSIS — N40.0 BENIGN PROSTATIC HYPERPLASIA WITHOUT LOWER URINARY TRACT SYMPTOMS: Chronic | ICD-10-CM

## 2019-02-21 DIAGNOSIS — T82.855A STENOSIS OF CORONARY ARTERY STENT, INITIAL ENCOUNTER: Chronic | ICD-10-CM

## 2019-02-21 DIAGNOSIS — Z85.831 PERSONAL HISTORY OF MALIGNANT NEOPLASM OF SOFT TISSUE: Chronic | ICD-10-CM

## 2019-02-21 DIAGNOSIS — R91.8 OTHER NONSPECIFIC ABNORMAL FINDING OF LUNG FIELD: ICD-10-CM

## 2019-02-21 PROCEDURE — 32666 THORACOSCOPY W/WEDGE RESECT: CPT | Mod: AS

## 2019-02-21 PROCEDURE — 88331 PATH CONSLTJ SURG 1 BLK 1SPC: CPT | Mod: 26

## 2019-02-21 PROCEDURE — 88307 TISSUE EXAM BY PATHOLOGIST: CPT | Mod: 26

## 2019-02-21 PROCEDURE — 32666 THORACOSCOPY W/WEDGE RESECT: CPT

## 2019-02-21 PROCEDURE — 88112 CYTOPATH CELL ENHANCE TECH: CPT | Mod: 26

## 2019-02-21 PROCEDURE — 71045 X-RAY EXAM CHEST 1 VIEW: CPT | Mod: 26

## 2019-02-21 PROCEDURE — 88360 TUMOR IMMUNOHISTOCHEM/MANUAL: CPT | Mod: 26

## 2019-02-21 PROCEDURE — S2900 ROBOTIC SURGICAL SYSTEM: CPT | Mod: NC

## 2019-02-21 PROCEDURE — 99233 SBSQ HOSP IP/OBS HIGH 50: CPT

## 2019-02-21 PROCEDURE — 88305 TISSUE EXAM BY PATHOLOGIST: CPT | Mod: 26

## 2019-02-21 RX ORDER — GLUCAGON INJECTION, SOLUTION 0.5 MG/.1ML
1 INJECTION, SOLUTION SUBCUTANEOUS ONCE
Qty: 0 | Refills: 0 | Status: DISCONTINUED | OUTPATIENT
Start: 2019-02-21 | End: 2019-02-23

## 2019-02-21 RX ORDER — HYDROMORPHONE HYDROCHLORIDE 2 MG/ML
0.25 INJECTION INTRAMUSCULAR; INTRAVENOUS; SUBCUTANEOUS
Qty: 0 | Refills: 0 | Status: DISCONTINUED | OUTPATIENT
Start: 2019-02-21 | End: 2019-02-21

## 2019-02-21 RX ORDER — HEPARIN SODIUM 5000 [USP'U]/ML
5000 INJECTION INTRAVENOUS; SUBCUTANEOUS ONCE
Qty: 0 | Refills: 0 | Status: DISCONTINUED | OUTPATIENT
Start: 2019-02-21 | End: 2019-02-23

## 2019-02-21 RX ORDER — DEXTROSE 50 % IN WATER 50 %
25 SYRINGE (ML) INTRAVENOUS ONCE
Qty: 0 | Refills: 0 | Status: DISCONTINUED | OUTPATIENT
Start: 2019-02-21 | End: 2019-02-23

## 2019-02-21 RX ORDER — HEPARIN SODIUM 5000 [USP'U]/ML
5000 INJECTION INTRAVENOUS; SUBCUTANEOUS EVERY 8 HOURS
Qty: 0 | Refills: 0 | Status: DISCONTINUED | OUTPATIENT
Start: 2019-02-21 | End: 2019-02-23

## 2019-02-21 RX ORDER — SODIUM CHLORIDE 9 MG/ML
1000 INJECTION, SOLUTION INTRAVENOUS
Qty: 0 | Refills: 0 | Status: DISCONTINUED | OUTPATIENT
Start: 2019-02-21 | End: 2019-02-23

## 2019-02-21 RX ORDER — TAMSULOSIN HYDROCHLORIDE 0.4 MG/1
0.4 CAPSULE ORAL AT BEDTIME
Qty: 0 | Refills: 0 | Status: DISCONTINUED | OUTPATIENT
Start: 2019-02-21 | End: 2019-02-23

## 2019-02-21 RX ORDER — DONEPEZIL HYDROCHLORIDE 10 MG/1
10 TABLET, FILM COATED ORAL AT BEDTIME
Qty: 0 | Refills: 0 | Status: DISCONTINUED | OUTPATIENT
Start: 2019-02-21 | End: 2019-02-23

## 2019-02-21 RX ORDER — QUETIAPINE FUMARATE 200 MG/1
25 TABLET, FILM COATED ORAL ONCE
Qty: 0 | Refills: 0 | Status: COMPLETED | OUTPATIENT
Start: 2019-02-21 | End: 2019-02-21

## 2019-02-21 RX ORDER — ACETAMINOPHEN 500 MG
1000 TABLET ORAL ONCE
Qty: 0 | Refills: 0 | Status: COMPLETED | OUTPATIENT
Start: 2019-02-21 | End: 2019-02-21

## 2019-02-21 RX ORDER — ASPIRIN/CALCIUM CARB/MAGNESIUM 324 MG
81 TABLET ORAL DAILY
Qty: 0 | Refills: 0 | Status: DISCONTINUED | OUTPATIENT
Start: 2019-02-22 | End: 2019-02-23

## 2019-02-21 RX ORDER — DEXTROSE 50 % IN WATER 50 %
15 SYRINGE (ML) INTRAVENOUS ONCE
Qty: 0 | Refills: 0 | Status: DISCONTINUED | OUTPATIENT
Start: 2019-02-21 | End: 2019-02-23

## 2019-02-21 RX ORDER — FOLIC ACID 0.8 MG
1 TABLET ORAL DAILY
Qty: 0 | Refills: 0 | Status: DISCONTINUED | OUTPATIENT
Start: 2019-02-21 | End: 2019-02-23

## 2019-02-21 RX ORDER — HYDROMORPHONE HYDROCHLORIDE 2 MG/ML
0.5 INJECTION INTRAMUSCULAR; INTRAVENOUS; SUBCUTANEOUS EVERY 4 HOURS
Qty: 0 | Refills: 0 | Status: DISCONTINUED | OUTPATIENT
Start: 2019-02-21 | End: 2019-02-23

## 2019-02-21 RX ORDER — ACETAMINOPHEN 500 MG
1000 TABLET ORAL ONCE
Qty: 0 | Refills: 0 | Status: DISCONTINUED | OUTPATIENT
Start: 2019-02-21 | End: 2019-02-23

## 2019-02-21 RX ORDER — DEXTROSE 50 % IN WATER 50 %
12.5 SYRINGE (ML) INTRAVENOUS ONCE
Qty: 0 | Refills: 0 | Status: DISCONTINUED | OUTPATIENT
Start: 2019-02-21 | End: 2019-02-23

## 2019-02-21 RX ORDER — SODIUM CHLORIDE 9 MG/ML
1000 INJECTION, SOLUTION INTRAVENOUS
Qty: 0 | Refills: 0 | Status: DISCONTINUED | OUTPATIENT
Start: 2019-02-21 | End: 2019-02-22

## 2019-02-21 RX ORDER — PANTOPRAZOLE SODIUM 20 MG/1
40 TABLET, DELAYED RELEASE ORAL
Qty: 0 | Refills: 0 | Status: DISCONTINUED | OUTPATIENT
Start: 2019-02-21 | End: 2019-02-23

## 2019-02-21 RX ORDER — ACETAMINOPHEN 500 MG
1000 TABLET ORAL ONCE
Qty: 0 | Refills: 0 | Status: COMPLETED | OUTPATIENT
Start: 2019-02-22 | End: 2019-02-22

## 2019-02-21 RX ORDER — SENNA PLUS 8.6 MG/1
2 TABLET ORAL AT BEDTIME
Qty: 0 | Refills: 0 | Status: DISCONTINUED | OUTPATIENT
Start: 2019-02-21 | End: 2019-02-23

## 2019-02-21 RX ORDER — OXYCODONE HYDROCHLORIDE 5 MG/1
5 TABLET ORAL EVERY 4 HOURS
Qty: 0 | Refills: 0 | Status: DISCONTINUED | OUTPATIENT
Start: 2019-02-21 | End: 2019-02-23

## 2019-02-21 RX ORDER — ONDANSETRON 8 MG/1
4 TABLET, FILM COATED ORAL ONCE
Qty: 0 | Refills: 0 | Status: DISCONTINUED | OUTPATIENT
Start: 2019-02-21 | End: 2019-02-21

## 2019-02-21 RX ORDER — MEMANTINE HYDROCHLORIDE 10 MG/1
10 TABLET ORAL DAILY
Qty: 0 | Refills: 0 | Status: DISCONTINUED | OUTPATIENT
Start: 2019-02-21 | End: 2019-02-23

## 2019-02-21 RX ORDER — DOCUSATE SODIUM 100 MG
100 CAPSULE ORAL THREE TIMES A DAY
Qty: 0 | Refills: 0 | Status: DISCONTINUED | OUTPATIENT
Start: 2019-02-21 | End: 2019-02-23

## 2019-02-21 RX ORDER — INSULIN LISPRO 100/ML
VIAL (ML) SUBCUTANEOUS
Qty: 0 | Refills: 0 | Status: DISCONTINUED | OUTPATIENT
Start: 2019-02-21 | End: 2019-02-23

## 2019-02-21 RX ORDER — ATORVASTATIN CALCIUM 80 MG/1
20 TABLET, FILM COATED ORAL AT BEDTIME
Qty: 0 | Refills: 0 | Status: DISCONTINUED | OUTPATIENT
Start: 2019-02-21 | End: 2019-02-23

## 2019-02-21 RX ADMIN — HYDROMORPHONE HYDROCHLORIDE 0.5 MILLIGRAM(S): 2 INJECTION INTRAMUSCULAR; INTRAVENOUS; SUBCUTANEOUS at 15:15

## 2019-02-21 RX ADMIN — Medication 400 MILLIGRAM(S): at 18:00

## 2019-02-21 RX ADMIN — OXYCODONE HYDROCHLORIDE 5 MILLIGRAM(S): 5 TABLET ORAL at 23:02

## 2019-02-21 RX ADMIN — HEPARIN SODIUM 5000 UNIT(S): 5000 INJECTION INTRAVENOUS; SUBCUTANEOUS at 16:03

## 2019-02-21 RX ADMIN — Medication 100 MILLIGRAM(S): at 17:29

## 2019-02-21 RX ADMIN — ATORVASTATIN CALCIUM 20 MILLIGRAM(S): 80 TABLET, FILM COATED ORAL at 21:55

## 2019-02-21 RX ADMIN — Medication 1 MILLIGRAM(S): at 17:29

## 2019-02-21 RX ADMIN — SODIUM CHLORIDE 30 MILLILITER(S): 9 INJECTION, SOLUTION INTRAVENOUS at 11:47

## 2019-02-21 RX ADMIN — HYDROMORPHONE HYDROCHLORIDE 0.5 MILLIGRAM(S): 2 INJECTION INTRAMUSCULAR; INTRAVENOUS; SUBCUTANEOUS at 15:00

## 2019-02-21 RX ADMIN — Medication 100 MILLIGRAM(S): at 21:55

## 2019-02-21 RX ADMIN — HEPARIN SODIUM 5000 UNIT(S): 5000 INJECTION INTRAVENOUS; SUBCUTANEOUS at 21:55

## 2019-02-21 NOTE — BRIEF OPERATIVE NOTE - PROCEDURE
<<-----Click on this checkbox to enter Procedure Robotic assistance in thoracoscopic procedure  02/21/2019  EMANUEL wedge  Active  FREDCARTOZ

## 2019-02-21 NOTE — PROGRESS NOTE ADULT - SUBJECTIVE AND OBJECTIVE BOX
DAI MORENO                     [id number]    HPI:  85 y/o  male with HTN, CAD- stented coronary artery, Dyslipidemia, T2DM (meds Dc'd on 03/2017) BPH, Dementia presents to [facility] for pre op evaluation accompanied by daughter Fidelina stated that follow up ct scan showed left lung mass.  Pt is now scheduled for Left Video Assisted Thoracoscopy, Left Upper Lobe Wedge Resection, Mediastinal Lymph Node Dissection on 02/21/19.  Of note pt s/p wide excision of the right posterior chest wall mass on 12/26/17 and 36 rounds of radiation therapy last being March 2018. (07 Feb 2019 16:39)      Procedure: Robotic assistance in thoracoscopic procedure  02/21/2019  EMANUEL wedge       Issues:  Pulmonary nodule  Dementia  CAD (coronary artery disease)  Glaucoma  Post op pain      PAST MEDICAL & SURGICAL HISTORY:  Sarcoma  Dementia  CAD (coronary artery disease)  Glaucoma  Cataract  H/O: osteoarthritis  Direct inguinal hernia  History of short term memory loss  Incontinence of urine  Urinary urgency  History of BPH  Blood incompatibility with cadaveric donor  H/O: HTN (hypertension)  Diabetes mellitus type II  History of sarcoma: right chest wall - 2017  Stenosis of coronary stent: x 3 last being 2011  Prostate enlargement: S/P Green light laser ablation of prostate; 04/2012  h/o bilat cataract repair -  3 yrs ago  H/O arthroscopy of left knee - 2012  Direct inguinal hernia repair - left - 20 yrs ago            VITAL SIGNS:  Vital Signs Last 24 Hrs  T(C): 36.3 (21 Feb 2019 16:00), Max: 36.8 (21 Feb 2019 07:45)  T(F): 97.4 (21 Feb 2019 16:00), Max: 98.2 (21 Feb 2019 07:45)  HR: 76 (21 Feb 2019 19:00) (76 - 103)  BP: 131/113 (21 Feb 2019 19:00) (110/61 - 140/68)  BP(mean): 116 (21 Feb 2019 19:00) (63 - 116)  RR: 18 (21 Feb 2019 19:00) (14 - 23)  SpO2: 92% (21 Feb 2019 19:00) (92% - 100%)    I/Os:   I&O's Detail    21 Feb 2019 07:01  -  21 Feb 2019 20:35  --------------------------------------------------------  IN:    lactated ringers.: 120 mL    lactated ringers.: 120 mL    Oral Fluid: 120 mL  Total IN: 360 mL    OUT:    Chest Tube: 40 mL  Total OUT: 40 mL    Total NET: 320 mL          CAPILLARY BLOOD GLUCOSE      POCT Blood Glucose.: 171 mg/dL (21 Feb 2019 17:07)  POCT Blood Glucose.: 107 mg/dL (21 Feb 2019 11:51)  POCT Blood Glucose.: 90 mg/dL (21 Feb 2019 07:49)      =======================MEDICATIONS===================  MEDICATIONS  (STANDING):  atorvastatin 20 milliGRAM(s) Oral at bedtime  dextrose 5%. 1000 milliLiter(s) (50 mL/Hr) IV Continuous <Continuous>  dextrose 50% Injectable 12.5 Gram(s) IV Push once  dextrose 50% Injectable 25 Gram(s) IV Push once  dextrose 50% Injectable 25 Gram(s) IV Push once  docusate sodium 100 milliGRAM(s) Oral three times a day  donepezil 10 milliGRAM(s) Oral at bedtime  folic acid 1 milliGRAM(s) Oral daily  heparin  Injectable 5000 Unit(s) SubCutaneous every 8 hours  heparin  Injectable 5000 Unit(s) SubCutaneous once  insulin lispro (HumaLOG) corrective regimen sliding scale   SubCutaneous three times a day before meals  lactated ringers. 1000 milliLiter(s) (30 mL/Hr) IV Continuous <Continuous>  lactated ringers. 1000 milliLiter(s) (30 mL/Hr) IV Continuous <Continuous>  memantine 10 milliGRAM(s) Oral daily  pantoprazole    Tablet 40 milliGRAM(s) Oral before breakfast  senna 2 Tablet(s) Oral at bedtime  tamsulosin 0.4 milliGRAM(s) Oral at bedtime    MEDICATIONS  (PRN):  dextrose 40% Gel 15 Gram(s) Oral once PRN Blood Glucose LESS THAN 70 milliGRAM(s)/deciliter  glucagon  Injectable 1 milliGRAM(s) IntraMuscular once PRN Glucose LESS THAN 70 milligrams/deciliter  HYDROmorphone  Injectable 0.5 milliGRAM(s) IV Push every 4 hours PRN Moderate Pain (4 - 6)        PHYSICAL EXAM============================  General:                         Awake, alert, not in any distress  Neuro:                            Moving all extremities to commands.   Respiratory:	Air entry fair and  bilateral conducted sounds                                           Effort even and unlabored.  CV:		Regular rate and rhythm. Normal S1/S2                                          Distal pulses present.  Abdomen:	                     Soft, non-distended. Bowel sounds present   Skin:		No rash.  Extremities:	Warm, no cyanosis or edema.  Palpable pulses        =============================NEUROLOGY============================  Pain control with PCA /  Tylenol IV     ==============================RESPIRATORY========================  Pt is on  2  L nasal canula   Comfortable, not in any distress.  Using incentive spirometry   Monitor chest tube output  Chest tube to suction 	  Continue bronchodilators, pulmonary toilet    ============================CARDIOVASCULAR======================  Continue hemodynamic monitoring.  Not on any pressors    =====================RENAL===================  Continue LR 30CC/hr    Monitor I/Os and electrolytes    ====================GASTROINTESTINAL===================  On clears, tolerating  Continue GI prophylaxis with  Protonix  Continue Zofran / Reglan for nausea - PRN	    ========================HEMATOLOGIC/ONCOLOGIC====================  Monitor chest tube output. No signs of active bleeding.   Follow CBC in AM    ============================INFECTIOUS DISEASE========================  Monitor for fever / leukocytosis.  All surgical incision / chest tube  sites look clean      Pt is on GI & DVT prophylaxis  OOB & ambulate       Pertinent clinical, laboratory, radiographic, hemodynamic, echocardiographic, respiratory data, microbiologic data and chart were reviewed and analyzed frequently throughout the course of the day and night  Patient seen, examined and plan discussed with CT Surgery / CTICU team during rounds.    Pt's status discussed with family at bedside, updated status        Galileo Neil DO, FACEP

## 2019-02-22 ENCOUNTER — TRANSCRIPTION ENCOUNTER (OUTPATIENT)
Age: 84
End: 2019-02-22

## 2019-02-22 LAB
ANION GAP SERPL CALC-SCNC: 12 MMO/L — SIGNIFICANT CHANGE UP (ref 7–14)
BASOPHILS # BLD AUTO: 0.01 K/UL — SIGNIFICANT CHANGE UP (ref 0–0.2)
BASOPHILS NFR BLD AUTO: 0.1 % — SIGNIFICANT CHANGE UP (ref 0–2)
BUN SERPL-MCNC: 24 MG/DL — HIGH (ref 7–23)
CALCIUM SERPL-MCNC: 9.4 MG/DL — SIGNIFICANT CHANGE UP (ref 8.4–10.5)
CHLORIDE SERPL-SCNC: 101 MMOL/L — SIGNIFICANT CHANGE UP (ref 98–107)
CO2 SERPL-SCNC: 23 MMOL/L — SIGNIFICANT CHANGE UP (ref 22–31)
CREAT SERPL-MCNC: 1.39 MG/DL — HIGH (ref 0.5–1.3)
EOSINOPHIL # BLD AUTO: 0.01 K/UL — SIGNIFICANT CHANGE UP (ref 0–0.5)
EOSINOPHIL NFR BLD AUTO: 0.1 % — SIGNIFICANT CHANGE UP (ref 0–6)
GLUCOSE SERPL-MCNC: 115 MG/DL — HIGH (ref 70–99)
HCT VFR BLD CALC: 41.1 % — SIGNIFICANT CHANGE UP (ref 39–50)
HGB BLD-MCNC: 12.6 G/DL — LOW (ref 13–17)
IMM GRANULOCYTES NFR BLD AUTO: 0.4 % — SIGNIFICANT CHANGE UP (ref 0–1.5)
LYMPHOCYTES # BLD AUTO: 0.82 K/UL — LOW (ref 1–3.3)
LYMPHOCYTES # BLD AUTO: 8 % — LOW (ref 13–44)
MCHC RBC-ENTMCNC: 28.4 PG — SIGNIFICANT CHANGE UP (ref 27–34)
MCHC RBC-ENTMCNC: 30.7 % — LOW (ref 32–36)
MCV RBC AUTO: 92.8 FL — SIGNIFICANT CHANGE UP (ref 80–100)
MONOCYTES # BLD AUTO: 0.5 K/UL — SIGNIFICANT CHANGE UP (ref 0–0.9)
MONOCYTES NFR BLD AUTO: 4.9 % — SIGNIFICANT CHANGE UP (ref 2–14)
NEUTROPHILS # BLD AUTO: 8.87 K/UL — HIGH (ref 1.8–7.4)
NEUTROPHILS NFR BLD AUTO: 86.5 % — HIGH (ref 43–77)
NRBC # FLD: 0 K/UL — LOW (ref 25–125)
PLATELET # BLD AUTO: 192 K/UL — SIGNIFICANT CHANGE UP (ref 150–400)
PMV BLD: 10.2 FL — SIGNIFICANT CHANGE UP (ref 7–13)
POTASSIUM SERPL-MCNC: 5 MMOL/L — SIGNIFICANT CHANGE UP (ref 3.5–5.3)
POTASSIUM SERPL-SCNC: 5 MMOL/L — SIGNIFICANT CHANGE UP (ref 3.5–5.3)
RBC # BLD: 4.43 M/UL — SIGNIFICANT CHANGE UP (ref 4.2–5.8)
RBC # FLD: 13.1 % — SIGNIFICANT CHANGE UP (ref 10.3–14.5)
SODIUM SERPL-SCNC: 136 MMOL/L — SIGNIFICANT CHANGE UP (ref 135–145)
WBC # BLD: 10.25 K/UL — SIGNIFICANT CHANGE UP (ref 3.8–10.5)
WBC # FLD AUTO: 10.25 K/UL — SIGNIFICANT CHANGE UP (ref 3.8–10.5)

## 2019-02-22 PROCEDURE — 71045 X-RAY EXAM CHEST 1 VIEW: CPT | Mod: 26

## 2019-02-22 PROCEDURE — 99233 SBSQ HOSP IP/OBS HIGH 50: CPT

## 2019-02-22 RX ORDER — IPRATROPIUM/ALBUTEROL SULFATE 18-103MCG
3 AEROSOL WITH ADAPTER (GRAM) INHALATION ONCE
Qty: 0 | Refills: 0 | Status: COMPLETED | OUTPATIENT
Start: 2019-02-22 | End: 2019-02-22

## 2019-02-22 RX ORDER — IPRATROPIUM/ALBUTEROL SULFATE 18-103MCG
3 AEROSOL WITH ADAPTER (GRAM) INHALATION EVERY 6 HOURS
Qty: 0 | Refills: 0 | Status: DISCONTINUED | OUTPATIENT
Start: 2019-02-22 | End: 2019-02-23

## 2019-02-22 RX ORDER — INSULIN LISPRO 100/ML
VIAL (ML) SUBCUTANEOUS AT BEDTIME
Qty: 0 | Refills: 0 | Status: DISCONTINUED | OUTPATIENT
Start: 2019-02-22 | End: 2019-02-23

## 2019-02-22 RX ORDER — INFLUENZA VIRUS VACCINE 15; 15; 15; 15 UG/.5ML; UG/.5ML; UG/.5ML; UG/.5ML
0.5 SUSPENSION INTRAMUSCULAR ONCE
Qty: 0 | Refills: 0 | Status: DISCONTINUED | OUTPATIENT
Start: 2019-02-22 | End: 2019-02-23

## 2019-02-22 RX ADMIN — HEPARIN SODIUM 5000 UNIT(S): 5000 INJECTION INTRAVENOUS; SUBCUTANEOUS at 06:49

## 2019-02-22 RX ADMIN — Medication 100 MILLIGRAM(S): at 22:18

## 2019-02-22 RX ADMIN — Medication 400 MILLIGRAM(S): at 05:30

## 2019-02-22 RX ADMIN — TAMSULOSIN HYDROCHLORIDE 0.4 MILLIGRAM(S): 0.4 CAPSULE ORAL at 06:49

## 2019-02-22 RX ADMIN — Medication 100 MILLIGRAM(S): at 05:33

## 2019-02-22 RX ADMIN — Medication 100 MILLIGRAM(S): at 14:18

## 2019-02-22 RX ADMIN — DONEPEZIL HYDROCHLORIDE 10 MILLIGRAM(S): 10 TABLET, FILM COATED ORAL at 22:17

## 2019-02-22 RX ADMIN — MEMANTINE HYDROCHLORIDE 10 MILLIGRAM(S): 10 TABLET ORAL at 12:22

## 2019-02-22 RX ADMIN — Medication 3 MILLILITER(S): at 21:55

## 2019-02-22 RX ADMIN — Medication 3 MILLILITER(S): at 05:52

## 2019-02-22 RX ADMIN — ATORVASTATIN CALCIUM 20 MILLIGRAM(S): 80 TABLET, FILM COATED ORAL at 22:17

## 2019-02-22 RX ADMIN — HEPARIN SODIUM 5000 UNIT(S): 5000 INJECTION INTRAVENOUS; SUBCUTANEOUS at 14:18

## 2019-02-22 RX ADMIN — TAMSULOSIN HYDROCHLORIDE 0.4 MILLIGRAM(S): 0.4 CAPSULE ORAL at 22:17

## 2019-02-22 RX ADMIN — PANTOPRAZOLE SODIUM 40 MILLIGRAM(S): 20 TABLET, DELAYED RELEASE ORAL at 06:52

## 2019-02-22 RX ADMIN — OXYCODONE HYDROCHLORIDE 5 MILLIGRAM(S): 5 TABLET ORAL at 00:00

## 2019-02-22 RX ADMIN — Medication 1 MILLIGRAM(S): at 12:22

## 2019-02-22 RX ADMIN — Medication 3 MILLILITER(S): at 17:37

## 2019-02-22 RX ADMIN — DONEPEZIL HYDROCHLORIDE 10 MILLIGRAM(S): 10 TABLET, FILM COATED ORAL at 05:33

## 2019-02-22 RX ADMIN — SENNA PLUS 2 TABLET(S): 8.6 TABLET ORAL at 22:17

## 2019-02-22 RX ADMIN — SENNA PLUS 2 TABLET(S): 8.6 TABLET ORAL at 05:34

## 2019-02-22 RX ADMIN — HEPARIN SODIUM 5000 UNIT(S): 5000 INJECTION INTRAVENOUS; SUBCUTANEOUS at 22:17

## 2019-02-22 RX ADMIN — Medication 1000 MILLIGRAM(S): at 06:30

## 2019-02-22 RX ADMIN — Medication 81 MILLIGRAM(S): at 12:22

## 2019-02-22 NOTE — CONSULT NOTE ADULT - SUBJECTIVE AND OBJECTIVE BOX
Patient is a 86y old  Male who presents with a chief complaint of lung surgery (2019 16:39), had a subtype of sarcoma resected from his back last year, s/p surgery and is doing OK in the CTICU. Denies any pain, cough, breathing difficulty.       PAST MEDICAL & SURGICAL HISTORY:  Sarcoma  Dementia  CAD (coronary artery disease)  Glaucoma  Cataract  H/O: osteoarthritis  Direct inguinal hernia  History of short term memory loss  Incontinence of urine  Urinary urgency  History of BPH  Blood incompatibility with cadaveric donor  H/O: HTN (hypertension)  Diabetes mellitus type II  History of sarcoma: right chest wall -   Stenosis of coronary stent: x 3 last being   Prostate enlargement: S/P Green light laser ablation of prostate; 2012  h/o bilat cataract repair -  3 yrs ago  H/O arthroscopy of left knee -   Direct inguinal hernia repair - left - 20 yrs ago        Meds:  acetaminophen  IVPB .. 1000 milliGRAM(s) IV Intermittent once  ALBUTerol/ipratropium for Nebulization 3 milliLiter(s) Nebulizer every 6 hours  aspirin enteric coated 81 milliGRAM(s) Oral daily  atorvastatin 20 milliGRAM(s) Oral at bedtime  dextrose 40% Gel 15 Gram(s) Oral once PRN  dextrose 5%. 1000 milliLiter(s) IV Continuous <Continuous>  dextrose 50% Injectable 12.5 Gram(s) IV Push once  dextrose 50% Injectable 25 Gram(s) IV Push once  dextrose 50% Injectable 25 Gram(s) IV Push once  docusate sodium 100 milliGRAM(s) Oral three times a day  donepezil 10 milliGRAM(s) Oral at bedtime  folic acid 1 milliGRAM(s) Oral daily  glucagon  Injectable 1 milliGRAM(s) IntraMuscular once PRN  heparin  Injectable 5000 Unit(s) SubCutaneous every 8 hours  heparin  Injectable 5000 Unit(s) SubCutaneous once  HYDROmorphone  Injectable 0.5 milliGRAM(s) IV Push every 4 hours PRN  influenza   Vaccine 0.5 milliLiter(s) IntraMuscular once  insulin lispro (HumaLOG) corrective regimen sliding scale   SubCutaneous three times a day before meals  lactated ringers. 1000 milliLiter(s) IV Continuous <Continuous>  lactated ringers. 1000 milliLiter(s) IV Continuous <Continuous>  memantine 10 milliGRAM(s) Oral daily  oxyCODONE    IR 5 milliGRAM(s) Oral every 4 hours PRN  pantoprazole    Tablet 40 milliGRAM(s) Oral before breakfast  senna 2 Tablet(s) Oral at bedtime  tamsulosin 0.4 milliGRAM(s) Oral at bedtime      No Known Allergies      FAMILY HISTORY:  Family history of lymphoma (Child, Sibling): sister alive , son   Family history of hypertension: mother, sister and brother  Family history of diabetes mellitus (Mother, Sibling): mother and brother      Vital Signs Last 24 Hrs  T(C): 36.7 (2019 04:00), Max: 36.7 (2019 04:00)  T(F): 98.1 (2019 04:00), Max: 98.1 (2019 04:00)  HR: 60 (2019 07:00) (53 - 103)  BP: 105/48 (2019 07:00) (93/72 - 148/46)  BP(mean): 63 (2019 07:00) (58 - 116)  RR: 15 (2019 07:00) (14 - 23)  SpO2: 97% (2019 07:00) (92% - 100%)                          12.6   10.25 )-----------( 192      ( 2019 03:00 )             41.1       02-22    136  |  101  |  24<H>  ----------------------------<  115<H>  5.0   |  23  |  1.39<H>    Ca    9.4      2019 03:00            PET/CT: IMPRESSION: Abnormal FDG-PET/CT scan.    1. Peripherally hypermetabolic, centrally photopenic left upper lobe lung   mass, increased in size as compared to CT dated 1/10/2018, is compatible   with metastasis.    2. Indeterminate hypermetabolic intramuscular focus, posterior to right   12th rib costovertebral joint. Please correlate clinically. Further   evaluation may be performed with ultrasound.    3. Postradiation therapy changes, right chest wall.                   GLADIS AVELAR M.D., NUCLEAR MEDICINE ATTENDING   This document has been electronically signed. Feb 15 2019  2:36PM

## 2019-02-22 NOTE — PROGRESS NOTE ADULT - SUBJECTIVE AND OBJECTIVE BOX
DAI MORENO                     [id number]    HPI:  85 y/o  male with HTN, CAD- stented coronary artery, Dyslipidemia, T2DM (meds Dc'd on 03/2017) BPH, Dementia presents to [facility] for pre op evaluation accompanied by daughter Fidelina stated that follow up ct scan showed left lung mass.  Pt is now scheduled for Left Video Assisted Thoracoscopy, Left Upper Lobe Wedge Resection, Mediastinal Lymph Node Dissection on 02/21/19.  Of note pt s/p wide excision of the right posterior chest wall mass on 12/26/17 and 36 rounds of radiation therapy last being March 2018. (07 Feb 2019 16:39)        Procedure: Robotic assistance in thoracoscopic procedure  02/21/2019  EMANUEL wedge       Issues:  Pulmonary nodule  Dementia  CAD (coronary artery disease)  Glaucoma  Post op pain      PAST MEDICAL & SURGICAL HISTORY:  Sarcoma  Dementia  CAD (coronary artery disease)  Glaucoma  Cataract  H/O: osteoarthritis  Direct inguinal hernia  History of short term memory loss  Incontinence of urine  Urinary urgency  History of BPH  Blood incompatibility with cadaveric donor  H/O: HTN (hypertension)  Diabetes mellitus type II  History of sarcoma: right chest wall - 2017  Stenosis of coronary stent: x 3 last being 2011  Prostate enlargement: S/P Green light laser ablation of prostate; 04/2012  h/o bilat cataract repair -  3 yrs ago  H/O arthroscopy of left knee - 2012  Direct inguinal hernia repair - left - 20 yrs ago            VITAL SIGNS:  Vital Signs Last 24 Hrs  T(C): 36.6 (22 Feb 2019 00:00), Max: 36.8 (21 Feb 2019 07:45)  T(F): 97.9 (22 Feb 2019 00:00), Max: 98.2 (21 Feb 2019 07:45)  HR: 57 (22 Feb 2019 06:00) (53 - 103)  BP: 93/72 (22 Feb 2019 06:00) (93/72 - 148/46)  BP(mean): 76 (22 Feb 2019 06:00) (58 - 116)  RR: 16 (22 Feb 2019 06:00) (14 - 23)  SpO2: 99% (22 Feb 2019 06:00) (92% - 100%)    I/Os:   I&O's Detail    21 Feb 2019 07:01  -  22 Feb 2019 07:00  --------------------------------------------------------  IN:    lactated ringers.: 120 mL    lactated ringers.: 450 mL    Oral Fluid: 120 mL  Total IN: 690 mL    OUT:    Chest Tube: 65 mL    Indwelling Catheter - Urethral: 525 mL  Total OUT: 590 mL    Total NET: 100 mL          CAPILLARY BLOOD GLUCOSE      POCT Blood Glucose.: 105 mg/dL (22 Feb 2019 06:39)  POCT Blood Glucose.: 143 mg/dL (21 Feb 2019 23:09)  POCT Blood Glucose.: 171 mg/dL (21 Feb 2019 17:07)  POCT Blood Glucose.: 107 mg/dL (21 Feb 2019 11:51)  POCT Blood Glucose.: 90 mg/dL (21 Feb 2019 07:49)      =======================MEDICATIONS===================  MEDICATIONS  (STANDING):  acetaminophen  IVPB .. 1000 milliGRAM(s) IV Intermittent once  ALBUTerol/ipratropium for Nebulization 3 milliLiter(s) Nebulizer every 6 hours  aspirin enteric coated 81 milliGRAM(s) Oral daily  atorvastatin 20 milliGRAM(s) Oral at bedtime  dextrose 5%. 1000 milliLiter(s) (50 mL/Hr) IV Continuous <Continuous>  dextrose 50% Injectable 12.5 Gram(s) IV Push once  dextrose 50% Injectable 25 Gram(s) IV Push once  dextrose 50% Injectable 25 Gram(s) IV Push once  docusate sodium 100 milliGRAM(s) Oral three times a day  donepezil 10 milliGRAM(s) Oral at bedtime  folic acid 1 milliGRAM(s) Oral daily  heparin  Injectable 5000 Unit(s) SubCutaneous every 8 hours  heparin  Injectable 5000 Unit(s) SubCutaneous once  insulin lispro (HumaLOG) corrective regimen sliding scale   SubCutaneous three times a day before meals  lactated ringers. 1000 milliLiter(s) (30 mL/Hr) IV Continuous <Continuous>  lactated ringers. 1000 milliLiter(s) (30 mL/Hr) IV Continuous <Continuous>  memantine 10 milliGRAM(s) Oral daily  pantoprazole    Tablet 40 milliGRAM(s) Oral before breakfast  senna 2 Tablet(s) Oral at bedtime  tamsulosin 0.4 milliGRAM(s) Oral at bedtime    MEDICATIONS  (PRN):  dextrose 40% Gel 15 Gram(s) Oral once PRN Blood Glucose LESS THAN 70 milliGRAM(s)/deciliter  glucagon  Injectable 1 milliGRAM(s) IntraMuscular once PRN Glucose LESS THAN 70 milligrams/deciliter  HYDROmorphone  Injectable 0.5 milliGRAM(s) IV Push every 4 hours PRN Severe Pain (7 - 10)  oxyCODONE    IR 5 milliGRAM(s) Oral every 4 hours PRN Moderate Pain (4 - 6)        PHYSICAL EXAM============================  General:                         Awake, alert, not in any distress  Neuro:                            Moving all extremities to commands.   Respiratory:	Air entry fair and  bilateral conducted sounds                                           Effort even and unlabored.  CV:		Regular rate and rhythm. Normal S1/S2                                          Distal pulses present.  Abdomen:	                     Soft, non-distended. Bowel sounds present   Skin:		No rash.  Extremities:	Warm, no cyanosis or edema.  Palpable pulses    ============================LABS=========================                        12.6   10.25 )-----------( 192      ( 22 Feb 2019 03:00 )             41.1     02-22    136  |  101  |  24<H>  ----------------------------<  115<H>  5.0   |  23  |  1.39<H>    Ca    9.4      22 Feb 2019 03:00                ============================IMAGING STUDIES=========================  < from: Xray Chest 1 View- PORTABLE-Urgent (02.21.19 @ 11:24) >  Heart size and the mediastinum cannot be accurately evaluated on this   projection.  There are low lung volumes.  Limited evaluation of the right lung shows no gross focal lung   consolidation. There may be a trace right pleural effusion.  There is left lung postsurgical change with chain sutures seen. Linear   opacity in the left upper lung adjacent chain sutures is likely due to   atelectasis and/or postsurgical change. The tip of the chest tube is in   the left apex. No pneumothorax is seen.  No left pleural effusion is noted.              IMPRESSION:  Low lung volumes.    Left lung postsurgical change with left chest tube.    No pneumothorax seen.    Linear left upper lung opacity adjacent to chain sutures is likely due to   atelectasis and/or postsurgical change.      ============================NEUROLOGY============================  Pain control with PCA /  Tylenol IV     ==============================RESPIRATORY========================  Pt is on  2  L nasal canula   Comfortable, not in any distress.  Using incentive spirometry   Monitor chest tube output  Chest tube to suction 	  Continue bronchodilators, pulmonary toilet    ============================CARDIOVASCULAR======================  Continue hemodynamic monitoring.  Not on any pressors    =====================RENAL===================  Continue LR 30CC/hr    Monitor I/Os and electrolytes    ====================GASTROINTESTINAL===================  On clears, tolerating  Continue GI prophylaxis with  Protonix  Continue Zofran / Reglan for nausea - PRN	    ========================HEMATOLOGIC/ONCOLOGIC====================  Monitor chest tube output. No signs of active bleeding.   Follow CBC in AM    ============================INFECTIOUS DISEASE========================  Monitor for fever / leukocytosis.  All surgical incision / chest tube  sites look clean      Pt is on GI & DVT prophylaxis  OOB & ambulate       Pertinent clinical, laboratory, radiographic, hemodynamic, echocardiographic, respiratory data, microbiologic data and chart were reviewed and analyzed frequently throughout the course of the day and night  Patient seen, examined and plan discussed with CT Surgery / CTICU team during rounds.    Pt's status discussed with family at bedside, updated status        Galileo Neil DO, FACEP

## 2019-02-22 NOTE — PROGRESS NOTE ADULT - SUBJECTIVE AND OBJECTIVE BOX
ANESTHESIA POSTOP CHECK    86y Male POSTOP DAY 1 S/P     [ X] NO APPARENT ANESTHESIA COMPLICATIONS      Comments:

## 2019-02-22 NOTE — CONSULT NOTE ADULT - ASSESSMENT
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

## 2019-02-23 VITALS — OXYGEN SATURATION: 98 %

## 2019-02-23 LAB
ANION GAP SERPL CALC-SCNC: 11 MMO/L — SIGNIFICANT CHANGE UP (ref 7–14)
BUN SERPL-MCNC: 29 MG/DL — HIGH (ref 7–23)
CALCIUM SERPL-MCNC: 8.8 MG/DL — SIGNIFICANT CHANGE UP (ref 8.4–10.5)
CHLORIDE SERPL-SCNC: 102 MMOL/L — SIGNIFICANT CHANGE UP (ref 98–107)
CO2 SERPL-SCNC: 23 MMOL/L — SIGNIFICANT CHANGE UP (ref 22–31)
CREAT SERPL-MCNC: 1.46 MG/DL — HIGH (ref 0.5–1.3)
GLUCOSE SERPL-MCNC: 90 MG/DL — SIGNIFICANT CHANGE UP (ref 70–99)
HCT VFR BLD CALC: 34.7 % — LOW (ref 39–50)
HGB BLD-MCNC: 10.9 G/DL — LOW (ref 13–17)
MCHC RBC-ENTMCNC: 28.4 PG — SIGNIFICANT CHANGE UP (ref 27–34)
MCHC RBC-ENTMCNC: 31.4 % — LOW (ref 32–36)
MCV RBC AUTO: 90.4 FL — SIGNIFICANT CHANGE UP (ref 80–100)
NRBC # FLD: 0 K/UL — LOW (ref 25–125)
PLATELET # BLD AUTO: 169 K/UL — SIGNIFICANT CHANGE UP (ref 150–400)
PMV BLD: 10.5 FL — SIGNIFICANT CHANGE UP (ref 7–13)
POTASSIUM SERPL-MCNC: 4.7 MMOL/L — SIGNIFICANT CHANGE UP (ref 3.5–5.3)
POTASSIUM SERPL-SCNC: 4.7 MMOL/L — SIGNIFICANT CHANGE UP (ref 3.5–5.3)
RBC # BLD: 3.84 M/UL — LOW (ref 4.2–5.8)
RBC # FLD: 13.3 % — SIGNIFICANT CHANGE UP (ref 10.3–14.5)
SODIUM SERPL-SCNC: 136 MMOL/L — SIGNIFICANT CHANGE UP (ref 135–145)
WBC # BLD: 6.62 K/UL — SIGNIFICANT CHANGE UP (ref 3.8–10.5)
WBC # FLD AUTO: 6.62 K/UL — SIGNIFICANT CHANGE UP (ref 3.8–10.5)

## 2019-02-23 PROCEDURE — 99238 HOSP IP/OBS DSCHRG MGMT 30/<: CPT

## 2019-02-23 PROCEDURE — 71045 X-RAY EXAM CHEST 1 VIEW: CPT | Mod: 26

## 2019-02-23 RX ORDER — IPRATROPIUM BROMIDE 0.2 MG/ML
500 SOLUTION, NON-ORAL INHALATION EVERY 6 HOURS
Qty: 0 | Refills: 0 | Status: DISCONTINUED | OUTPATIENT
Start: 2019-02-23 | End: 2019-02-23

## 2019-02-23 RX ORDER — ATORVASTATIN CALCIUM 80 MG/1
1 TABLET, FILM COATED ORAL
Qty: 0 | Refills: 0 | COMMUNITY
Start: 2019-02-23

## 2019-02-23 RX ORDER — AMLODIPINE BESYLATE 2.5 MG/1
10 TABLET ORAL ONCE
Qty: 0 | Refills: 0 | Status: COMPLETED | OUTPATIENT
Start: 2019-02-23 | End: 2019-02-23

## 2019-02-23 RX ORDER — SENNA PLUS 8.6 MG/1
2 TABLET ORAL
Qty: 0 | Refills: 0 | COMMUNITY
Start: 2019-02-23

## 2019-02-23 RX ORDER — IPRATROPIUM BROMIDE 0.2 MG/ML
2.5 SOLUTION, NON-ORAL INHALATION
Qty: 140 | Refills: 0
Start: 2019-02-23 | End: 2019-03-08

## 2019-02-23 RX ORDER — DOCUSATE SODIUM 100 MG
1 CAPSULE ORAL
Qty: 0 | Refills: 0 | COMMUNITY
Start: 2019-02-23

## 2019-02-23 RX ORDER — ATORVASTATIN CALCIUM 80 MG/1
1 TABLET, FILM COATED ORAL
Qty: 0 | Refills: 0 | COMMUNITY

## 2019-02-23 RX ORDER — IPRATROPIUM BROMIDE 0.2 MG/ML
2.5 SOLUTION, NON-ORAL INHALATION
Qty: 140 | Refills: 0 | OUTPATIENT
Start: 2019-02-23 | End: 2019-03-08

## 2019-02-23 RX ORDER — AMLODIPINE BESYLATE 2.5 MG/1
10 TABLET ORAL DAILY
Qty: 0 | Refills: 0 | Status: DISCONTINUED | OUTPATIENT
Start: 2019-02-23 | End: 2019-02-23

## 2019-02-23 RX ORDER — ATORVASTATIN CALCIUM 80 MG/1
1 TABLET, FILM COATED ORAL
Qty: 0 | Refills: 0 | DISCHARGE
Start: 2019-02-23

## 2019-02-23 RX ORDER — OXYCODONE HYDROCHLORIDE 5 MG/1
1 TABLET ORAL
Qty: 42 | Refills: 0 | OUTPATIENT
Start: 2019-02-23 | End: 2019-03-01

## 2019-02-23 RX ORDER — LOSARTAN POTASSIUM 100 MG/1
100 TABLET, FILM COATED ORAL DAILY
Qty: 0 | Refills: 0 | Status: DISCONTINUED | OUTPATIENT
Start: 2019-02-23 | End: 2019-02-23

## 2019-02-23 RX ADMIN — Medication 3 MILLILITER(S): at 10:58

## 2019-02-23 RX ADMIN — AMLODIPINE BESYLATE 10 MILLIGRAM(S): 2.5 TABLET ORAL at 00:39

## 2019-02-23 RX ADMIN — OXYCODONE HYDROCHLORIDE 5 MILLIGRAM(S): 5 TABLET ORAL at 16:20

## 2019-02-23 RX ADMIN — Medication 100 MILLIGRAM(S): at 12:48

## 2019-02-23 RX ADMIN — MEMANTINE HYDROCHLORIDE 10 MILLIGRAM(S): 10 TABLET ORAL at 12:48

## 2019-02-23 RX ADMIN — AMLODIPINE BESYLATE 10 MILLIGRAM(S): 2.5 TABLET ORAL at 08:58

## 2019-02-23 RX ADMIN — PANTOPRAZOLE SODIUM 40 MILLIGRAM(S): 20 TABLET, DELAYED RELEASE ORAL at 06:17

## 2019-02-23 RX ADMIN — HEPARIN SODIUM 5000 UNIT(S): 5000 INJECTION INTRAVENOUS; SUBCUTANEOUS at 06:17

## 2019-02-23 RX ADMIN — Medication 3 MILLILITER(S): at 04:31

## 2019-02-23 RX ADMIN — Medication 81 MILLIGRAM(S): at 12:48

## 2019-02-23 RX ADMIN — HEPARIN SODIUM 5000 UNIT(S): 5000 INJECTION INTRAVENOUS; SUBCUTANEOUS at 12:48

## 2019-02-23 RX ADMIN — Medication 1 MILLIGRAM(S): at 12:48

## 2019-02-23 RX ADMIN — Medication 100 MILLIGRAM(S): at 06:17

## 2019-02-23 NOTE — DISCHARGE NOTE ADULT - PATIENT PORTAL LINK FT
You can access the [facility] Patient Portal, offered by [facility], by registering with the following website: [email or web address]

## 2019-02-23 NOTE — DISCHARGE NOTE ADULT - NS AS ACTIVITY OBS
Do not drive or operate machinery/Showering allowed/Walking-Indoors allowed/No Heavy lifting/straining/Walking-Outdoors allowed/Stairs allowed No Heavy lifting/straining/Do not drive or operate machinery/Sex allowed/Do not make important decisions/Walking-Indoors allowed/Stairs allowed/Walking-Outdoors allowed/Showering allowed

## 2019-02-23 NOTE — PROGRESS NOTE ADULT - ASSESSMENT
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

## 2019-02-23 NOTE — DISCHARGE NOTE ADULT - PLAN OF CARE
Wound healing; Treatment planning based on the final pathology report Stable for outpatient follow up Walk 4-5 x per day. Increase as tolerated. You may climb stairs. Continue to use incentive spirometer.   Keep incision uncovered, shower daily. Suture will be removed in office.   See Dr. Heredia in 1-2 weeks. Call for an apt. [phone]  Have a Chest xray done prior to that apt and then bring those images with you.

## 2019-02-23 NOTE — DISCHARGE NOTE ADULT - HOME CARE AGENCY
[facility]  [phone] .   Nurse to call and visit day after discharge 2/24/2019

## 2019-02-23 NOTE — DISCHARGE NOTE ADULT - CARE PROVIDER_API CALL
Crispin Heredia (MD)  Surgery; Thoracic Surgery  [address] [place] [address] [place] [address]  Phone: [phone]  Fax: [phone]  Follow Up Time:     Greg Renteria)  Internal Medicine  [address] [place] [address]  Phone: [phone]  Fax: [phone]  Follow Up Time:

## 2019-02-23 NOTE — DISCHARGE NOTE ADULT - MEDICATION SUMMARY - MEDICATIONS TO TAKE
I will START or STAY ON the medications listed below when I get home from the hospital:    calcium 500 mg orally daily  -- Indication: For vitamin    MVI 1 tab orally daily  last dose 2/13  -- Indication: For vitamin    aspirin 81 mg oral tablet  -- 1 tab(s) by mouth once a day, am  -- Indication: For Anti platelet    oxyCODONE 5 mg oral tablet  -- 1 tab(s) by mouth every 4 hours, As needed, Moderate Pain to severe pain MDD:6  -- Indication: For pain    Tylenol 325 mg oral tablet  -- 2 tab(s) by mouth every 4 hours, As Needed  -- Indication: For pain    valsartan 160 mg oral tablet  -- 1 tab(s) by mouth once a day in am  -- Indication: For blood pressure    Flomax 0.4 mg oral capsule  -- 1 cap(s) by mouth once a day, am  -- Indication: For bph    atorvastatin 20 mg oral tablet  -- 1 tab(s) by mouth once a day (at bedtime)  -- Indication: For cholesterol    ipratropium 500 mcg/2.5 mL inhalation solution  -- 2.5 milliliter(s) inhaled every 6 hours, As Needed   -- Indication: For breathing    Norvasc 10 mg oral tablet  -- 1 tab(s) by mouth once a day, am  -- Indication: For blood pressure    donepezil 10 mg oral tablet  -- 1 tab(s) by mouth once a day in am  -- Indication: For CNS agent    senna oral tablet  -- 2 tab(s) by mouth once a day (at bedtime)  -- Indication: For constipation    docusate sodium 100 mg oral capsule  -- 1 cap(s) by mouth 3 times a day  -- Indication: For constipation    Namenda 10 mg oral tablet  -- orally once a day, am  -- Indication: For Dementia    folic acid 1 mg oral tablet  -- 1 tab(s) by mouth once a day  -- Indication: For Supplement

## 2019-02-23 NOTE — DISCHARGE NOTE ADULT - HOSPITAL COURSE
86 year old  male s/p excision of a R posterior chest wall mass and radiation tx had a follow up CT scan that showed a left lung mass.  Pt underwent a Left Video Assisted Thoracoscopy, Left Upper Lobe Wedge Resection, Mediastinal Lymph Node Dissection on 02/21/19.  After being monitored in CTICU for a night, he was transferred to . His chest tube and Sanchez were removed and he was for discharge home the next day. 86 year old  male s/p excision of a R posterior chest wall mass and radiation tx had a follow up CT scan that showed a left lung mass.  Pt underwent a Left Video Assisted Thoracoscopy, Left Upper Lobe Wedge Resection, Mediastinal Lymph Node Dissection on 02/21/19.  After being monitored in CTICU for a night, he was transferred to . His chest tube and Sanchez were removed. CXR remained. Stable. Pt voiding without issues. Seen by PT and recc for rehab but family wants home plan. Set up w VNS and home PT. VATS cd/i. Pt feels well. No complaints. Cleared for d/c to home by Dr. Millard.   Vital Signs Last 24 Hrs  T(C): 37 (23 Feb 2019 08:14), Max: 37.2 (23 Feb 2019 06:11)  T(F): 98.6 (23 Feb 2019 08:14), Max: 99 (23 Feb 2019 06:11)  HR: 78 (23 Feb 2019 10:58) (60 - 81)  BP: 119/45 (23 Feb 2019 08:14) (115/50 - 152/61)  BP(mean): --  RR: 18 (23 Feb 2019 08:14) (18 - 20)  SpO2: 98% (23 Feb 2019 10:58) (92% - 100%)

## 2019-02-23 NOTE — DISCHARGE NOTE ADULT - CARE PLAN
Principal Discharge DX:	Abnormal finding of lung  Goal:	Wound healing; Treatment planning based on the final pathology report  Assessment and plan of treatment:	Stable for outpatient follow up Principal Discharge DX:	Abnormal finding of lung  Goal:	Wound healing; Treatment planning based on the final pathology report  Assessment and plan of treatment:	Walk 4-5 x per day. Increase as tolerated. You may climb stairs. Continue to use incentive spirometer.   Keep incision uncovered, shower daily. Suture will be removed in office.   See Dr. Heredia in 1-2 weeks. Call for an apt. [phone]  Have a Chest xray done prior to that apt and then bring those images with you.

## 2019-02-23 NOTE — DISCHARGE NOTE ADULT - CONDITIONS AT DISCHARGE
vss. patient in agreement with discharge. pateints daughter taking him home. xray script and booklet given.

## 2019-02-23 NOTE — DISCHARGE NOTE ADULT - ADDITIONAL INSTRUCTIONS
See Dr Heredia in 2 weeks.  Call for an appointment and bring a new chest X-ray with you when you come.  Watch for fevers, pus from wounds or increasing redness and if noted, call Dr Heredia.

## 2019-02-23 NOTE — PROGRESS NOTE ADULT - SUBJECTIVE AND OBJECTIVE BOX
Pt is feeling Ok, no pain, cough or breathing difficulty and ROS otherwise unremarkable. Daughter is with him today.      Meds:  acetaminophen  IVPB .. 1000 milliGRAM(s) IV Intermittent once  ALBUTerol/ipratropium for Nebulization 3 milliLiter(s) Nebulizer every 6 hours  amLODIPine   Tablet 10 milliGRAM(s) Oral daily  aspirin enteric coated 81 milliGRAM(s) Oral daily  atorvastatin 20 milliGRAM(s) Oral at bedtime  dextrose 40% Gel 15 Gram(s) Oral once PRN  dextrose 5%. 1000 milliLiter(s) IV Continuous <Continuous>  dextrose 50% Injectable 12.5 Gram(s) IV Push once  dextrose 50% Injectable 25 Gram(s) IV Push once  dextrose 50% Injectable 25 Gram(s) IV Push once  docusate sodium 100 milliGRAM(s) Oral three times a day  donepezil 10 milliGRAM(s) Oral at bedtime  folic acid 1 milliGRAM(s) Oral daily  glucagon  Injectable 1 milliGRAM(s) IntraMuscular once PRN  heparin  Injectable 5000 Unit(s) SubCutaneous every 8 hours  heparin  Injectable 5000 Unit(s) SubCutaneous once  HYDROmorphone  Injectable 0.5 milliGRAM(s) IV Push every 4 hours PRN  influenza   Vaccine 0.5 milliLiter(s) IntraMuscular once  insulin lispro (HumaLOG) corrective regimen sliding scale   SubCutaneous at bedtime  insulin lispro (HumaLOG) corrective regimen sliding scale   SubCutaneous three times a day before meals  losartan 100 milliGRAM(s) Oral daily  memantine 10 milliGRAM(s) Oral daily  oxyCODONE    IR 5 milliGRAM(s) Oral every 4 hours PRN  pantoprazole    Tablet 40 milliGRAM(s) Oral before breakfast  senna 2 Tablet(s) Oral at bedtime  tamsulosin 0.4 milliGRAM(s) Oral at bedtime      Vital Signs Last 24 Hrs  T(C): 37 (23 Feb 2019 08:14), Max: 37.2 (23 Feb 2019 06:11)  T(F): 98.6 (23 Feb 2019 08:14), Max: 99 (23 Feb 2019 06:11)  HR: 67 (23 Feb 2019 08:14) (60 - 81)  BP: 119/45 (23 Feb 2019 08:14) (115/50 - 152/61)  BP(mean): --  RR: 18 (23 Feb 2019 08:14) (17 - 20)  SpO2: 92% (23 Feb 2019 09:23) (92% - 100%)                          10.9   6.62  )-----------( 169      ( 23 Feb 2019 05:24 )             34.7       02-23    136  |  102  |  29<H>  ----------------------------<  90  4.7   |  23  |  1.46<H>    Ca    8.8      23 Feb 2019 05:24

## 2019-03-04 ENCOUNTER — FORM ENCOUNTER (OUTPATIENT)
Age: 84
End: 2019-03-04

## 2019-03-05 ENCOUNTER — APPOINTMENT (OUTPATIENT)
Dept: THORACIC SURGERY | Facility: CLINIC | Age: 84
End: 2019-03-05
Payer: MEDICARE

## 2019-03-05 ENCOUNTER — OUTPATIENT (OUTPATIENT)
Dept: OUTPATIENT SERVICES | Facility: HOSPITAL | Age: 84
LOS: 1 days | End: 2019-03-05
Payer: MEDICARE

## 2019-03-05 ENCOUNTER — APPOINTMENT (OUTPATIENT)
Dept: RADIOLOGY | Facility: HOSPITAL | Age: 84
End: 2019-03-05

## 2019-03-05 VITALS
BODY MASS INDEX: 32.32 KG/M2 | RESPIRATION RATE: 16 BRPM | SYSTOLIC BLOOD PRESSURE: 126 MMHG | HEART RATE: 80 BPM | WEIGHT: 194 LBS | OXYGEN SATURATION: 94 % | HEIGHT: 65 IN | DIASTOLIC BLOOD PRESSURE: 78 MMHG

## 2019-03-05 DIAGNOSIS — Z85.831 PERSONAL HISTORY OF MALIGNANT NEOPLASM OF SOFT TISSUE: Chronic | ICD-10-CM

## 2019-03-05 DIAGNOSIS — R91.8 OTHER NONSPECIFIC ABNORMAL FINDING OF LUNG FIELD: ICD-10-CM

## 2019-03-05 DIAGNOSIS — N40.0 BENIGN PROSTATIC HYPERPLASIA WITHOUT LOWER URINARY TRACT SYMPTOMS: Chronic | ICD-10-CM

## 2019-03-05 DIAGNOSIS — T82.855A STENOSIS OF CORONARY ARTERY STENT, INITIAL ENCOUNTER: Chronic | ICD-10-CM

## 2019-03-05 PROCEDURE — 99024 POSTOP FOLLOW-UP VISIT: CPT

## 2019-03-05 PROCEDURE — 71046 X-RAY EXAM CHEST 2 VIEWS: CPT | Mod: 26

## 2019-03-08 ENCOUNTER — EMERGENCY (EMERGENCY)
Facility: HOSPITAL | Age: 84
LOS: 1 days | Discharge: HOME | End: 2019-03-08
Attending: EMERGENCY MEDICINE | Admitting: EMERGENCY MEDICINE
Payer: MEDICARE

## 2019-03-08 VITALS
DIASTOLIC BLOOD PRESSURE: 76 MMHG | TEMPERATURE: 98 F | HEART RATE: 101 BPM | RESPIRATION RATE: 22 BRPM | SYSTOLIC BLOOD PRESSURE: 131 MMHG | OXYGEN SATURATION: 99 %

## 2019-03-08 VITALS
OXYGEN SATURATION: 100 % | HEART RATE: 80 BPM | TEMPERATURE: 99 F | SYSTOLIC BLOOD PRESSURE: 130 MMHG | DIASTOLIC BLOOD PRESSURE: 49 MMHG | RESPIRATION RATE: 18 BRPM

## 2019-03-08 DIAGNOSIS — J90 PLEURAL EFFUSION, NOT ELSEWHERE CLASSIFIED: ICD-10-CM

## 2019-03-08 DIAGNOSIS — Z85.831 PERSONAL HISTORY OF MALIGNANT NEOPLASM OF SOFT TISSUE: Chronic | ICD-10-CM

## 2019-03-08 DIAGNOSIS — N40.0 BENIGN PROSTATIC HYPERPLASIA WITHOUT LOWER URINARY TRACT SYMPTOMS: Chronic | ICD-10-CM

## 2019-03-08 DIAGNOSIS — T82.855A STENOSIS OF CORONARY ARTERY STENT, INITIAL ENCOUNTER: Chronic | ICD-10-CM

## 2019-03-08 LAB
ALBUMIN SERPL ELPH-MCNC: 3.7 G/DL — SIGNIFICANT CHANGE UP (ref 3.3–5)
ALP SERPL-CCNC: 119 U/L — SIGNIFICANT CHANGE UP (ref 40–120)
ALT FLD-CCNC: 18 U/L — SIGNIFICANT CHANGE UP (ref 4–41)
ANION GAP SERPL CALC-SCNC: 9 MMO/L — SIGNIFICANT CHANGE UP (ref 7–14)
APTT BLD: 30.7 SEC — SIGNIFICANT CHANGE UP (ref 27.5–36.3)
AST SERPL-CCNC: 13 U/L — SIGNIFICANT CHANGE UP (ref 4–40)
BASOPHILS # BLD AUTO: 0.03 K/UL — SIGNIFICANT CHANGE UP (ref 0–0.2)
BASOPHILS NFR BLD AUTO: 0.5 % — SIGNIFICANT CHANGE UP (ref 0–2)
BILIRUB SERPL-MCNC: 0.2 MG/DL — SIGNIFICANT CHANGE UP (ref 0.2–1.2)
BUN SERPL-MCNC: 23 MG/DL — SIGNIFICANT CHANGE UP (ref 7–23)
CALCIUM SERPL-MCNC: 9.9 MG/DL — SIGNIFICANT CHANGE UP (ref 8.4–10.5)
CHLORIDE SERPL-SCNC: 106 MMOL/L — SIGNIFICANT CHANGE UP (ref 98–107)
CO2 SERPL-SCNC: 26 MMOL/L — SIGNIFICANT CHANGE UP (ref 22–31)
CREAT SERPL-MCNC: 1.35 MG/DL — HIGH (ref 0.5–1.3)
EOSINOPHIL # BLD AUTO: 0.86 K/UL — HIGH (ref 0–0.5)
EOSINOPHIL NFR BLD AUTO: 14 % — HIGH (ref 0–6)
GLUCOSE SERPL-MCNC: 85 MG/DL — SIGNIFICANT CHANGE UP (ref 70–99)
HCT VFR BLD CALC: 36.2 % — LOW (ref 39–50)
HGB BLD-MCNC: 11.3 G/DL — LOW (ref 13–17)
IMM GRANULOCYTES NFR BLD AUTO: 0.2 % — SIGNIFICANT CHANGE UP (ref 0–1.5)
INR BLD: 1.14 — SIGNIFICANT CHANGE UP (ref 0.88–1.17)
LYMPHOCYTES # BLD AUTO: 1.01 K/UL — SIGNIFICANT CHANGE UP (ref 1–3.3)
LYMPHOCYTES # BLD AUTO: 16.4 % — SIGNIFICANT CHANGE UP (ref 13–44)
MCHC RBC-ENTMCNC: 28.3 PG — SIGNIFICANT CHANGE UP (ref 27–34)
MCHC RBC-ENTMCNC: 31.2 % — LOW (ref 32–36)
MCV RBC AUTO: 90.7 FL — SIGNIFICANT CHANGE UP (ref 80–100)
MONOCYTES # BLD AUTO: 0.6 K/UL — SIGNIFICANT CHANGE UP (ref 0–0.9)
MONOCYTES NFR BLD AUTO: 9.8 % — SIGNIFICANT CHANGE UP (ref 2–14)
NEUTROPHILS # BLD AUTO: 3.64 K/UL — SIGNIFICANT CHANGE UP (ref 1.8–7.4)
NEUTROPHILS NFR BLD AUTO: 59.1 % — SIGNIFICANT CHANGE UP (ref 43–77)
NRBC # FLD: 0 K/UL — LOW (ref 25–125)
PLATELET # BLD AUTO: 260 K/UL — SIGNIFICANT CHANGE UP (ref 150–400)
PMV BLD: 9.7 FL — SIGNIFICANT CHANGE UP (ref 7–13)
POTASSIUM SERPL-MCNC: 5 MMOL/L — SIGNIFICANT CHANGE UP (ref 3.5–5.3)
POTASSIUM SERPL-SCNC: 5 MMOL/L — SIGNIFICANT CHANGE UP (ref 3.5–5.3)
PROT SERPL-MCNC: 7.2 G/DL — SIGNIFICANT CHANGE UP (ref 6–8.3)
PROTHROM AB SERPL-ACNC: 13.1 SEC — SIGNIFICANT CHANGE UP (ref 9.8–13.1)
RBC # BLD: 3.99 M/UL — LOW (ref 4.2–5.8)
RBC # FLD: 13.3 % — SIGNIFICANT CHANGE UP (ref 10.3–14.5)
SODIUM SERPL-SCNC: 141 MMOL/L — SIGNIFICANT CHANGE UP (ref 135–145)
WBC # BLD: 6.15 K/UL — SIGNIFICANT CHANGE UP (ref 3.8–10.5)
WBC # FLD AUTO: 6.15 K/UL — SIGNIFICANT CHANGE UP (ref 3.8–10.5)

## 2019-03-08 PROCEDURE — 99284 EMERGENCY DEPT VISIT MOD MDM: CPT | Mod: 25,GC

## 2019-03-08 PROCEDURE — 93010 ELECTROCARDIOGRAM REPORT: CPT

## 2019-03-08 PROCEDURE — 71275 CT ANGIOGRAPHY CHEST: CPT | Mod: 26

## 2019-03-08 RX ORDER — ALBUTEROL 90 UG/1
2 AEROSOL, METERED ORAL
Qty: 1 | Refills: 0 | OUTPATIENT
Start: 2019-03-08

## 2019-03-08 RX ORDER — ALBUTEROL 90 UG/1
2.5 AEROSOL, METERED ORAL ONCE
Qty: 0 | Refills: 0 | Status: COMPLETED | OUTPATIENT
Start: 2019-03-08 | End: 2019-03-08

## 2019-03-08 RX ORDER — ACETAMINOPHEN 500 MG
650 TABLET ORAL ONCE
Qty: 0 | Refills: 0 | Status: COMPLETED | OUTPATIENT
Start: 2019-03-08 | End: 2019-03-08

## 2019-03-08 RX ADMIN — ALBUTEROL 2.5 MILLIGRAM(S): 90 AEROSOL, METERED ORAL at 09:21

## 2019-03-08 RX ADMIN — Medication 650 MILLIGRAM(S): at 16:27

## 2019-03-08 RX ADMIN — ALBUTEROL 2.5 MILLIGRAM(S): 90 AEROSOL, METERED ORAL at 14:03

## 2019-03-08 RX ADMIN — Medication 650 MILLIGRAM(S): at 16:56

## 2019-03-08 RX ADMIN — ALBUTEROL 2.5 MILLIGRAM(S): 90 AEROSOL, METERED ORAL at 17:12

## 2019-03-08 NOTE — ED ADULT NURSE NOTE - CHIEF COMPLAINT QUOTE
Pt c/o cough, wheezing chest congestion & L chest pain (only w/ cough).  Per wife for a few days, worse since yesterday. No fevers, not able to bring up sputum.  s/p L Lung resection (Lung CA) on 2/21 by Dr. Heredia.  PMHx CAD on ASA, Dementia, Lung CA.  Vitals stable, O2sat stable on RA.  Per wife gave tylenol PTA.

## 2019-03-08 NOTE — PROVIDER CONTACT NOTE (OTHER) - ASSESSMENT
Met with patient and his daughter Fidelina Liu [phone] at the bedside, introduce self and role of . Patient is still medically active and awaiting Surgery consult. Patients daughter states that her father ambulates with a walker at home has dementia and requires care with all of his ADL's. Patient has a Home Care Services from [facility] has RN ,SW ,PT, also has a HHA 2 x per week x 3hrs per day provided by [facility]. Patient daughter also has a private hire for 4hrs per day x 7 days a week. Case Manger will continue to be available to the patient and family if needed.

## 2019-03-08 NOTE — ED PROVIDER NOTE - CARE PROVIDER_API CALL
Crispin Heredia (MD)  Surgery; Thoracic Surgery  [id number] [address] [place] [address] [place] [address]  Phone: [phone]  Fax: [phone]  Follow Up Time:

## 2019-03-08 NOTE — CONSULT NOTE ADULT - ASSESSMENT
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 He presents to ED with 1 day of wheezing, left sided chest pain, and dyspnea. This is worse than usual, as daughter states he does have "chronic wheezing" but now it appeared to be more persistent. In ED he a CT chest is c/w Moderate-large layering left pleural effusion as well as an approx 6.4 x 2.2cm density along the chain sutures within the left midlung may represent postsurgical changes.

## 2019-03-08 NOTE — ED ADULT TRIAGE NOTE - CHIEF COMPLAINT QUOTE
Pt c/o cough, wheezing chest congestion & L chest pain (only w/ cough).  Per wife for a few days, worse since yesterday.  s/p L Lung resection (Lung CA) on 2/21 by Dr. Heredia.  PMHx CAD on ASA, Dementia, Lung CA.  Vitals stable, O2sat stable on RA.  Per wife gave tylenol PTA. Pt c/o cough, wheezing chest congestion & L chest pain (only w/ cough).  Per wife for a few days, worse since yesterday. No fevers, not able to bring up sputum.  s/p L Lung resection (Lung CA) on 2/21 by Dr. Heredia.  PMHx CAD on ASA, Dementia, Lung CA.  Vitals stable, O2sat stable on RA.  Per wife gave tylenol PTA.

## 2019-03-08 NOTE — ED PROVIDER NOTE - CARE PLAN
Principal Discharge DX:	Pleural effusion on left  Assessment and plan of treatment:	F/u with Dr. Heredia (CT surgery) in 1 week

## 2019-03-08 NOTE — ED PROVIDER NOTE - NSFOLLOWUPINSTRUCTIONS_ED_ALL_ED_FT
You had pain and difficulty breathing due to fluid in your left lung. The fluid results showed no malignant cells, but the biopsy showed recurrence of your myxofibrosarcoma. It is possible that the fluid may be due to this, even with the fluid results being negative, as it may not have caught an appropriate batch of cells.  You will be given Albuterol for wheezes. If your wheezes worsen even on the albuterol, please return to the Emergency Room.  You should follow up with your appointment with Dr. De La Paz on Wednesday. In addition, you should make an appointment with your cardiothoracic surgeon, Dr. Heredia, within 1 week.

## 2019-03-08 NOTE — ED PROVIDER NOTE - CLINICAL SUMMARY MEDICAL DECISION MAKING FREE TEXT BOX
87 yo M with a PMH dementia, BPH, HTN, HLD, and R chest wall myxofibrosarcoma (dx'd 12/2017, s/p radiation) who presents with 1 day of wheezing, left sided chest pain, cough, and dyspnea. Obtain rectal temp, r/o PE vs post-obstructive PNA with CTA, labs. Explained to daughter the results of bx. She will see surgical oncologist, Dr. De La Paz, on Wednesday of next week (5 days) and medical oncologist (at [facility]).

## 2019-03-08 NOTE — ED PROVIDER NOTE - OBJECTIVE STATEMENT
87 yo M with a PMH dementia, BPH, HTN, HLD, and R chest wall myxofibrosarcoma (dx'd 12/2017, s/p radiation) who presents with 1 day of wheezing, left sided chest pain, and dyspnea. History provided by daughter. He does have chronic wheezing on exertion, but yesterday, his wheezing occurred at rest, along with persistent non-productive cough, left sided chest pain, and dyspnea. His daughter gave him two Atrovent treatments, the second of which helped slightly. He had a PET scan on 2/15/19 to test for recurrence of his malignancy, and it showed a EMANUEL mass. He had a resection on 2/21/19 along with left sided pleural fluid resection. The pleural fluid was negative, but the bx showed recurrence of the myxofibrosarcoma, which the daughter did not know of.  Patient's weight and appetite had been stable prior to yesterday. No history of blood clot.  No nausea, vomiting, abdominal pain, difficulty with BMs or urination. 87 yo M with a PMH dementia, BPH, HTN, HLD, and R chest wall myxofibrosarcoma (dx'd 12/2017, s/p radiation) who presents with 1 day of wheezing, left sided chest pain, and dyspnea. History provided by daughter. He does have chronic wheezing on exertion, but yesterday, his wheezing occurred at rest, along with persistent non-productive cough, left sided chest pain, and dyspnea. His daughter gave him two Atrovent treatments, the second of which helped slightly. He had a PET scan on 2/15/19 to test for recurrence of his malignancy, and it showed a EMANUEL mass. He had a resection on 2/21/19 along with left sided pleural fluid resection. The pleural fluid was negative, but the bx showed recurrence of the myxofibrosarcoma, which the daughter did not know of.  Patient's weight and appetite had been stable prior to yesterday. No history of blood clot. Does have chronic LE edema, not worsening.  No nausea, vomiting, abdominal pain, difficulty with BMs or urination.

## 2019-03-08 NOTE — ED ADULT NURSE NOTE - OBJECTIVE STATEMENT
Ambulatory accompanied by wife who states that patient has had a cough and pleuritic L sided CP for the past few days. Denies fevers/chills, SOB. Patient had lung resection due to lung CA 2/21 by MD Heredia. Patient has had no issues with incision site or fevers since discharge. Ambulated with steady gait to bedside. Denies sputum, cough is described as dry. Pending evaluation by MD. Safety maintained, needs attended, will continue to monitor.

## 2019-03-08 NOTE — ED PROVIDER NOTE - ATTENDING CONTRIBUTION TO CARE
Cardenas: 86 yom with 1 day of shortness of breath with wheezing worsened with exertion with cough and left sided chest pain. Pt's medical history as above with recent surgery. Family aware of findings and follow up scheduled this week. Renzo nurse, states pt had significant resp distress with movement prior to coming in and told her he felt like "I'm going to die." On exam, pt appears comfortable, vitals improved from triage, RR14, Hr80s. Decreased BS left base, slight wheeze, no jvd, no edema. rrr, abd soft, +tn at left chest wall, no erythema, localized edema.  R/p PE, effusion, infection - labs, CT, nebs.

## 2019-03-08 NOTE — CONSULT NOTE ADULT - PROBLEM SELECTOR RECOMMENDATION 9
Left pleural effusion noted on CT angio > moderate to large per radiology read  Recommend respiratory treatments for wheezing   Stable on room air and lying flat  currently patient appear stable   Vitals reviewed  Will discuss with Dr. Heredia and follow with recommendation Left pleural effusion noted on CT angio > moderate to large per radiology read  Recommend respiratory treatments for wheezing   Stable on room air and lying flat  currently patient appear stable   Vitals reviewed  Discussed with Dr. Matias, who reviewed imaging; agrees with moderate effusion as per radiology read but patient is clinically stable and much improved with nebulizer treatments with improved respiratory symptoms. Recommend patient follow up with Dr. Heredia in office in one week as an outpatient. If worsening symptoms return to ED. Discussed with Dr. Matias

## 2019-03-08 NOTE — ED PROVIDER NOTE - CARDIAC, MLM
Normal rate, regular rhythm.  Heart sounds S1, S2.  No murmurs, rubs or gallops. Normal rate, regular rhythm.  Heart sounds S1, S2.  No murmurs, rubs or gallops. Bilateral 1+ LE edema, not TTP

## 2019-03-18 ENCOUNTER — FORM ENCOUNTER (OUTPATIENT)
Age: 84
End: 2019-03-18

## 2019-03-19 ENCOUNTER — APPOINTMENT (OUTPATIENT)
Dept: THORACIC SURGERY | Facility: CLINIC | Age: 84
End: 2019-03-19
Payer: MEDICARE

## 2019-03-19 ENCOUNTER — OUTPATIENT (OUTPATIENT)
Dept: OUTPATIENT SERVICES | Facility: HOSPITAL | Age: 84
LOS: 1 days | End: 2019-03-19
Payer: MEDICARE

## 2019-03-19 ENCOUNTER — APPOINTMENT (OUTPATIENT)
Dept: RADIOLOGY | Facility: HOSPITAL | Age: 84
End: 2019-03-19

## 2019-03-19 VITALS
OXYGEN SATURATION: 96 % | WEIGHT: 190 LBS | SYSTOLIC BLOOD PRESSURE: 120 MMHG | BODY MASS INDEX: 31.65 KG/M2 | HEIGHT: 65 IN | HEART RATE: 83 BPM | DIASTOLIC BLOOD PRESSURE: 72 MMHG | RESPIRATION RATE: 16 BRPM

## 2019-03-19 DIAGNOSIS — N40.0 BENIGN PROSTATIC HYPERPLASIA WITHOUT LOWER URINARY TRACT SYMPTOMS: Chronic | ICD-10-CM

## 2019-03-19 DIAGNOSIS — T82.855A STENOSIS OF CORONARY ARTERY STENT, INITIAL ENCOUNTER: Chronic | ICD-10-CM

## 2019-03-19 DIAGNOSIS — Z85.831 PERSONAL HISTORY OF MALIGNANT NEOPLASM OF SOFT TISSUE: Chronic | ICD-10-CM

## 2019-03-19 DIAGNOSIS — J90 PLEURAL EFFUSION, NOT ELSEWHERE CLASSIFIED: ICD-10-CM

## 2019-03-19 PROCEDURE — 71046 X-RAY EXAM CHEST 2 VIEWS: CPT | Mod: 26

## 2019-03-19 PROCEDURE — 99024 POSTOP FOLLOW-UP VISIT: CPT

## 2019-03-19 RX ORDER — ALBUTEROL SULFATE 2.5 MG/3ML
(2.5 MG/3ML) SOLUTION RESPIRATORY (INHALATION)
Qty: 3 | Refills: 3 | Status: ACTIVE | COMMUNITY
Start: 2019-03-19 | End: 1900-01-01

## 2019-04-09 ENCOUNTER — APPOINTMENT (OUTPATIENT)
Dept: THORACIC SURGERY | Facility: CLINIC | Age: 84
End: 2019-04-09

## 2019-04-09 ENCOUNTER — APPOINTMENT (OUTPATIENT)
Dept: RADIATION ONCOLOGY | Facility: CLINIC | Age: 84
End: 2019-04-09

## 2019-04-25 ENCOUNTER — INPATIENT (INPATIENT)
Facility: HOSPITAL | Age: 84
LOS: 0 days | Discharge: HOME | End: 2019-04-26
Attending: INTERNAL MEDICINE | Admitting: INTERNAL MEDICINE
Payer: MEDICARE

## 2019-04-25 VITALS
DIASTOLIC BLOOD PRESSURE: 59 MMHG | TEMPERATURE: 99 F | OXYGEN SATURATION: 99 % | SYSTOLIC BLOOD PRESSURE: 127 MMHG | RESPIRATION RATE: 17 BRPM | HEART RATE: 103 BPM

## 2019-04-25 DIAGNOSIS — C49.9 MALIGNANT NEOPLASM OF CONNECTIVE AND SOFT TISSUE, UNSPECIFIED: ICD-10-CM

## 2019-04-25 DIAGNOSIS — R09.89 OTHER SPECIFIED SYMPTOMS AND SIGNS INVOLVING THE CIRCULATORY AND RESPIRATORY SYSTEMS: ICD-10-CM

## 2019-04-25 DIAGNOSIS — J45.909 UNSPECIFIED ASTHMA, UNCOMPLICATED: ICD-10-CM

## 2019-04-25 DIAGNOSIS — E11.628 TYPE 2 DIABETES MELLITUS WITH OTHER SKIN COMPLICATIONS: ICD-10-CM

## 2019-04-25 DIAGNOSIS — N40.0 BENIGN PROSTATIC HYPERPLASIA WITHOUT LOWER URINARY TRACT SYMPTOMS: ICD-10-CM

## 2019-04-25 DIAGNOSIS — T82.855A STENOSIS OF CORONARY ARTERY STENT, INITIAL ENCOUNTER: Chronic | ICD-10-CM

## 2019-04-25 DIAGNOSIS — Z85.831 PERSONAL HISTORY OF MALIGNANT NEOPLASM OF SOFT TISSUE: Chronic | ICD-10-CM

## 2019-04-25 DIAGNOSIS — I25.10 ATHEROSCLEROTIC HEART DISEASE OF NATIVE CORONARY ARTERY WITHOUT ANGINA PECTORIS: ICD-10-CM

## 2019-04-25 DIAGNOSIS — N40.0 BENIGN PROSTATIC HYPERPLASIA WITHOUT LOWER URINARY TRACT SYMPTOMS: Chronic | ICD-10-CM

## 2019-04-25 DIAGNOSIS — I10 ESSENTIAL (PRIMARY) HYPERTENSION: ICD-10-CM

## 2019-04-25 DIAGNOSIS — R50.9 FEVER, UNSPECIFIED: ICD-10-CM

## 2019-04-25 DIAGNOSIS — Z29.9 ENCOUNTER FOR PROPHYLACTIC MEASURES, UNSPECIFIED: ICD-10-CM

## 2019-04-25 LAB
ALBUMIN SERPL ELPH-MCNC: 4.4 G/DL — SIGNIFICANT CHANGE UP (ref 3.3–5)
ALP SERPL-CCNC: 124 U/L — HIGH (ref 40–120)
ALT FLD-CCNC: 35 U/L — SIGNIFICANT CHANGE UP (ref 4–41)
ANION GAP SERPL CALC-SCNC: 14 MMO/L — SIGNIFICANT CHANGE UP (ref 7–14)
APPEARANCE UR: CLEAR — SIGNIFICANT CHANGE UP
AST SERPL-CCNC: 37 U/L — SIGNIFICANT CHANGE UP (ref 4–40)
B PERT DNA SPEC QL NAA+PROBE: NOT DETECTED — SIGNIFICANT CHANGE UP
BACTERIA # UR AUTO: NEGATIVE — SIGNIFICANT CHANGE UP
BASE EXCESS BLDV CALC-SCNC: -0.1 MMOL/L — SIGNIFICANT CHANGE UP
BILIRUB SERPL-MCNC: 0.4 MG/DL — SIGNIFICANT CHANGE UP (ref 0.2–1.2)
BILIRUB UR-MCNC: NEGATIVE — SIGNIFICANT CHANGE UP
BLOOD GAS VENOUS - CREATININE: SIGNIFICANT CHANGE UP MG/DL (ref 0.5–1.3)
BLOOD UR QL VISUAL: SIGNIFICANT CHANGE UP
BUN SERPL-MCNC: 30 MG/DL — HIGH (ref 7–23)
C PNEUM DNA SPEC QL NAA+PROBE: NOT DETECTED — SIGNIFICANT CHANGE UP
CALCIUM SERPL-MCNC: 10 MG/DL — SIGNIFICANT CHANGE UP (ref 8.4–10.5)
CHLORIDE BLDV-SCNC: 108 MMOL/L — SIGNIFICANT CHANGE UP (ref 96–108)
CHLORIDE SERPL-SCNC: 103 MMOL/L — SIGNIFICANT CHANGE UP (ref 98–107)
CO2 SERPL-SCNC: 25 MMOL/L — SIGNIFICANT CHANGE UP (ref 22–31)
COLOR SPEC: YELLOW — SIGNIFICANT CHANGE UP
CREAT SERPL-MCNC: 1.69 MG/DL — HIGH (ref 0.5–1.3)
FLUAV H1 2009 PAND RNA SPEC QL NAA+PROBE: NOT DETECTED — SIGNIFICANT CHANGE UP
FLUAV H1 RNA SPEC QL NAA+PROBE: NOT DETECTED — SIGNIFICANT CHANGE UP
FLUAV H3 RNA SPEC QL NAA+PROBE: NOT DETECTED — SIGNIFICANT CHANGE UP
FLUAV SUBTYP SPEC NAA+PROBE: NOT DETECTED — SIGNIFICANT CHANGE UP
FLUBV RNA SPEC QL NAA+PROBE: NOT DETECTED — SIGNIFICANT CHANGE UP
GAS PNL BLDV: 137 MMOL/L — SIGNIFICANT CHANGE UP (ref 136–146)
GLUCOSE BLDV-MCNC: 136 — HIGH (ref 70–99)
GLUCOSE SERPL-MCNC: 129 MG/DL — HIGH (ref 70–99)
GLUCOSE UR-MCNC: NEGATIVE — SIGNIFICANT CHANGE UP
HADV DNA SPEC QL NAA+PROBE: NOT DETECTED — SIGNIFICANT CHANGE UP
HCO3 BLDV-SCNC: 24 MMOL/L — SIGNIFICANT CHANGE UP (ref 20–27)
HCOV PNL SPEC NAA+PROBE: SIGNIFICANT CHANGE UP
HCT VFR BLD CALC: 41.3 % — SIGNIFICANT CHANGE UP (ref 39–50)
HCT VFR BLDV CALC: 39.1 % — SIGNIFICANT CHANGE UP (ref 39–51)
HGB BLD-MCNC: 12.9 G/DL — LOW (ref 13–17)
HGB BLDV-MCNC: 12.7 G/DL — LOW (ref 13–17)
HMPV RNA SPEC QL NAA+PROBE: NOT DETECTED — SIGNIFICANT CHANGE UP
HPIV1 RNA SPEC QL NAA+PROBE: NOT DETECTED — SIGNIFICANT CHANGE UP
HPIV2 RNA SPEC QL NAA+PROBE: NOT DETECTED — SIGNIFICANT CHANGE UP
HPIV3 RNA SPEC QL NAA+PROBE: NOT DETECTED — SIGNIFICANT CHANGE UP
HPIV4 RNA SPEC QL NAA+PROBE: NOT DETECTED — SIGNIFICANT CHANGE UP
HYALINE CASTS # UR AUTO: NEGATIVE — SIGNIFICANT CHANGE UP
KETONES UR-MCNC: NEGATIVE — SIGNIFICANT CHANGE UP
LACTATE BLDV-MCNC: 1.9 MMOL/L — SIGNIFICANT CHANGE UP (ref 0.5–2)
LACTATE BLDV-MCNC: 3.3 MMOL/L — HIGH (ref 0.5–2)
LEUKOCYTE ESTERASE UR-ACNC: SIGNIFICANT CHANGE UP
MAGNESIUM SERPL-MCNC: 2.4 MG/DL — SIGNIFICANT CHANGE UP (ref 1.6–2.6)
MCHC RBC-ENTMCNC: 28.4 PG — SIGNIFICANT CHANGE UP (ref 27–34)
MCHC RBC-ENTMCNC: 31.2 % — LOW (ref 32–36)
MCV RBC AUTO: 91 FL — SIGNIFICANT CHANGE UP (ref 80–100)
NITRITE UR-MCNC: NEGATIVE — SIGNIFICANT CHANGE UP
NRBC # FLD: 0 K/UL — SIGNIFICANT CHANGE UP (ref 0–0)
NT-PROBNP SERPL-SCNC: 414.5 PG/ML — SIGNIFICANT CHANGE UP
PCO2 BLDV: 39 MMHG — LOW (ref 41–51)
PH BLDV: 7.41 PH — SIGNIFICANT CHANGE UP (ref 7.32–7.43)
PH UR: 6 — SIGNIFICANT CHANGE UP (ref 5–8)
PLATELET # BLD AUTO: 154 K/UL — SIGNIFICANT CHANGE UP (ref 150–400)
PMV BLD: 11.5 FL — SIGNIFICANT CHANGE UP (ref 7–13)
PO2 BLDV: 38 MMHG — SIGNIFICANT CHANGE UP (ref 35–40)
POTASSIUM BLDV-SCNC: 4.2 MMOL/L — SIGNIFICANT CHANGE UP (ref 3.4–4.5)
POTASSIUM SERPL-MCNC: 5 MMOL/L — SIGNIFICANT CHANGE UP (ref 3.5–5.3)
POTASSIUM SERPL-SCNC: 5 MMOL/L — SIGNIFICANT CHANGE UP (ref 3.5–5.3)
PROT SERPL-MCNC: 8.3 G/DL — SIGNIFICANT CHANGE UP (ref 6–8.3)
PROT UR-MCNC: 20 — SIGNIFICANT CHANGE UP
RBC # BLD: 4.54 M/UL — SIGNIFICANT CHANGE UP (ref 4.2–5.8)
RBC # FLD: 14.3 % — SIGNIFICANT CHANGE UP (ref 10.3–14.5)
RBC CASTS # UR COMP ASSIST: HIGH (ref 0–?)
RSV RNA SPEC QL NAA+PROBE: NOT DETECTED — SIGNIFICANT CHANGE UP
RV+EV RNA SPEC QL NAA+PROBE: DETECTED — HIGH
SAO2 % BLDV: 70.2 % — SIGNIFICANT CHANGE UP (ref 60–85)
SODIUM SERPL-SCNC: 142 MMOL/L — SIGNIFICANT CHANGE UP (ref 135–145)
SP GR SPEC: > 1.04 — HIGH (ref 1–1.04)
SQUAMOUS # UR AUTO: SIGNIFICANT CHANGE UP
TROPONIN T, HIGH SENSITIVITY: 36 NG/L — SIGNIFICANT CHANGE UP (ref ?–14)
TROPONIN T, HIGH SENSITIVITY: 44 NG/L — SIGNIFICANT CHANGE UP (ref ?–14)
UROBILINOGEN FLD QL: NORMAL — SIGNIFICANT CHANGE UP
WBC # BLD: 9.56 K/UL — SIGNIFICANT CHANGE UP (ref 3.8–10.5)
WBC # FLD AUTO: 9.56 K/UL — SIGNIFICANT CHANGE UP (ref 3.8–10.5)
WBC UR QL: >50 — HIGH (ref 0–?)

## 2019-04-25 PROCEDURE — 99222 1ST HOSP IP/OBS MODERATE 55: CPT

## 2019-04-25 PROCEDURE — 93970 EXTREMITY STUDY: CPT | Mod: 26

## 2019-04-25 PROCEDURE — 71275 CT ANGIOGRAPHY CHEST: CPT | Mod: 26

## 2019-04-25 PROCEDURE — 71045 X-RAY EXAM CHEST 1 VIEW: CPT | Mod: 26

## 2019-04-25 PROCEDURE — 99223 1ST HOSP IP/OBS HIGH 75: CPT

## 2019-04-25 RX ORDER — TAMSULOSIN HYDROCHLORIDE 0.4 MG/1
1 CAPSULE ORAL
Qty: 0 | Refills: 0 | COMMUNITY

## 2019-04-25 RX ORDER — ALBUTEROL 90 UG/1
2.5 AEROSOL, METERED ORAL EVERY 6 HOURS
Qty: 0 | Refills: 0 | Status: DISCONTINUED | OUTPATIENT
Start: 2019-04-25 | End: 2019-04-26

## 2019-04-25 RX ORDER — FOLIC ACID 0.8 MG
1 TABLET ORAL
Qty: 0 | Refills: 0 | COMMUNITY

## 2019-04-25 RX ORDER — ONDANSETRON 8 MG/1
4 TABLET, FILM COATED ORAL EVERY 6 HOURS
Qty: 0 | Refills: 0 | Status: DISCONTINUED | OUTPATIENT
Start: 2019-04-25 | End: 2019-04-26

## 2019-04-25 RX ORDER — VALSARTAN 80 MG/1
1 TABLET ORAL
Qty: 0 | Refills: 0 | COMMUNITY

## 2019-04-25 RX ORDER — IPRATROPIUM/ALBUTEROL SULFATE 18-103MCG
3 AEROSOL WITH ADAPTER (GRAM) INHALATION ONCE
Qty: 0 | Refills: 0 | Status: COMPLETED | OUTPATIENT
Start: 2019-04-25 | End: 2019-04-25

## 2019-04-25 RX ORDER — MEMANTINE HYDROCHLORIDE 10 MG/1
0 TABLET ORAL
Qty: 0 | Refills: 0 | COMMUNITY

## 2019-04-25 RX ORDER — DONEPEZIL HYDROCHLORIDE 10 MG/1
1 TABLET, FILM COATED ORAL
Qty: 0 | Refills: 0 | COMMUNITY

## 2019-04-25 RX ORDER — AMLODIPINE BESYLATE 2.5 MG/1
1 TABLET ORAL
Qty: 0 | Refills: 0 | COMMUNITY

## 2019-04-25 RX ORDER — ATORVASTATIN CALCIUM 80 MG/1
20 TABLET, FILM COATED ORAL AT BEDTIME
Qty: 0 | Refills: 0 | Status: DISCONTINUED | OUTPATIENT
Start: 2019-04-25 | End: 2019-04-26

## 2019-04-25 RX ORDER — ACETAMINOPHEN 500 MG
650 TABLET ORAL EVERY 6 HOURS
Qty: 0 | Refills: 0 | Status: DISCONTINUED | OUTPATIENT
Start: 2019-04-25 | End: 2019-04-26

## 2019-04-25 RX ORDER — ENOXAPARIN SODIUM 100 MG/ML
30 INJECTION SUBCUTANEOUS DAILY
Qty: 0 | Refills: 0 | Status: DISCONTINUED | OUTPATIENT
Start: 2019-04-25 | End: 2019-04-26

## 2019-04-25 RX ORDER — VALSARTAN 80 MG/1
160 TABLET ORAL DAILY
Qty: 0 | Refills: 0 | Status: DISCONTINUED | OUTPATIENT
Start: 2019-04-25 | End: 2019-04-26

## 2019-04-25 RX ORDER — DONEPEZIL HYDROCHLORIDE 10 MG/1
10 TABLET, FILM COATED ORAL AT BEDTIME
Qty: 0 | Refills: 0 | Status: DISCONTINUED | OUTPATIENT
Start: 2019-04-25 | End: 2019-04-26

## 2019-04-25 RX ORDER — SODIUM CHLORIDE 9 MG/ML
1000 INJECTION INTRAMUSCULAR; INTRAVENOUS; SUBCUTANEOUS
Qty: 0 | Refills: 0 | Status: DISCONTINUED | OUTPATIENT
Start: 2019-04-25 | End: 2019-04-25

## 2019-04-25 RX ORDER — VANCOMYCIN HCL 1 G
1000 VIAL (EA) INTRAVENOUS ONCE
Qty: 0 | Refills: 0 | Status: COMPLETED | OUTPATIENT
Start: 2019-04-25 | End: 2019-04-25

## 2019-04-25 RX ORDER — ALBUTEROL 90 UG/1
1 AEROSOL, METERED ORAL EVERY 4 HOURS
Qty: 0 | Refills: 0 | Status: DISCONTINUED | OUTPATIENT
Start: 2019-04-25 | End: 2019-04-26

## 2019-04-25 RX ORDER — ACETAMINOPHEN 500 MG
650 TABLET ORAL ONCE
Qty: 0 | Refills: 0 | Status: COMPLETED | OUTPATIENT
Start: 2019-04-25 | End: 2019-04-25

## 2019-04-25 RX ORDER — AMLODIPINE BESYLATE 2.5 MG/1
10 TABLET ORAL DAILY
Qty: 0 | Refills: 0 | Status: DISCONTINUED | OUTPATIENT
Start: 2019-04-25 | End: 2019-04-26

## 2019-04-25 RX ORDER — TIOTROPIUM BROMIDE 18 UG/1
1 CAPSULE ORAL; RESPIRATORY (INHALATION) DAILY
Qty: 0 | Refills: 0 | Status: DISCONTINUED | OUTPATIENT
Start: 2019-04-25 | End: 2019-04-26

## 2019-04-25 RX ORDER — MEMANTINE HYDROCHLORIDE 10 MG/1
10 TABLET ORAL DAILY
Qty: 0 | Refills: 0 | Status: DISCONTINUED | OUTPATIENT
Start: 2019-04-25 | End: 2019-04-26

## 2019-04-25 RX ORDER — ASPIRIN/CALCIUM CARB/MAGNESIUM 324 MG
81 TABLET ORAL DAILY
Qty: 0 | Refills: 0 | Status: DISCONTINUED | OUTPATIENT
Start: 2019-04-25 | End: 2019-04-26

## 2019-04-25 RX ORDER — SODIUM CHLORIDE 9 MG/ML
500 INJECTION INTRAMUSCULAR; INTRAVENOUS; SUBCUTANEOUS ONCE
Qty: 0 | Refills: 0 | Status: COMPLETED | OUTPATIENT
Start: 2019-04-25 | End: 2019-04-25

## 2019-04-25 RX ORDER — ASPIRIN/CALCIUM CARB/MAGNESIUM 324 MG
1 TABLET ORAL
Qty: 0 | Refills: 0 | COMMUNITY

## 2019-04-25 RX ORDER — PIPERACILLIN AND TAZOBACTAM 4; .5 G/20ML; G/20ML
3.38 INJECTION, POWDER, LYOPHILIZED, FOR SOLUTION INTRAVENOUS ONCE
Qty: 0 | Refills: 0 | Status: COMPLETED | OUTPATIENT
Start: 2019-04-25 | End: 2019-04-25

## 2019-04-25 RX ORDER — TAMSULOSIN HYDROCHLORIDE 0.4 MG/1
0.4 CAPSULE ORAL AT BEDTIME
Qty: 0 | Refills: 0 | Status: DISCONTINUED | OUTPATIENT
Start: 2019-04-25 | End: 2019-04-26

## 2019-04-25 RX ORDER — INFLUENZA VIRUS VACCINE 15; 15; 15; 15 UG/.5ML; UG/.5ML; UG/.5ML; UG/.5ML
0.5 SUSPENSION INTRAMUSCULAR ONCE
Qty: 0 | Refills: 0 | Status: DISCONTINUED | OUTPATIENT
Start: 2019-04-25 | End: 2019-04-26

## 2019-04-25 RX ADMIN — Medication 3 MILLILITER(S): at 06:52

## 2019-04-25 RX ADMIN — PIPERACILLIN AND TAZOBACTAM 200 GRAM(S): 4; .5 INJECTION, POWDER, LYOPHILIZED, FOR SOLUTION INTRAVENOUS at 08:08

## 2019-04-25 RX ADMIN — Medication 3 MILLILITER(S): at 06:49

## 2019-04-25 RX ADMIN — Medication 3 MILLILITER(S): at 07:38

## 2019-04-25 RX ADMIN — Medication 650 MILLIGRAM(S): at 08:07

## 2019-04-25 RX ADMIN — ALBUTEROL 2.5 MILLIGRAM(S): 90 AEROSOL, METERED ORAL at 20:50

## 2019-04-25 RX ADMIN — Medication 250 MILLIGRAM(S): at 08:25

## 2019-04-25 RX ADMIN — SODIUM CHLORIDE 500 MILLILITER(S): 9 INJECTION INTRAMUSCULAR; INTRAVENOUS; SUBCUTANEOUS at 08:08

## 2019-04-25 RX ADMIN — ALBUTEROL 2.5 MILLIGRAM(S): 90 AEROSOL, METERED ORAL at 16:30

## 2019-04-25 NOTE — ED ADULT NURSE REASSESSMENT NOTE - NS ED NURSE REASSESS COMMENT FT1
report given to LUCAS Hall x [phone]. pt to go to bed 724a. pending transport, daughter remains at bedside, will cont to monitor.

## 2019-04-25 NOTE — ED ADULT NURSE NOTE - CHIEF COMPLAINT QUOTE
Patient c/o wheezing for few days worsening today. Patient reports taking 2 albuterol treatments prior to ED arrival. Patient c/o productive cough. 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 Currently not on any chemo/radiation. Hx. dementia, HTN. DM, stents x3.

## 2019-04-25 NOTE — ED PROVIDER NOTE - CARE PLAN
Principal Discharge DX:	Fever  Secondary Diagnosis:	Tachycardia  Secondary Diagnosis:	Shortness of breath  Secondary Diagnosis:	Wheezing

## 2019-04-25 NOTE — H&P ADULT - PROBLEM SELECTOR PLAN 7
IMPROVE VTE Individual Risk Assessment    RISK                                                          Points  [] Previous VTE                                           3  [] Thrombophilia                                        2  [] Lower limb paralysis                              2   [] Current Cancer                                       2   [x] Immobilization > 24 hrs                        1  [] ICU/CCU stay > 24 hours                       1  [x] Age > 60                                                   1    IMPROVE VTE Score:2 lovenox

## 2019-04-25 NOTE — PATIENT PROFILE ADULT - FUNCTIONAL SCREEN CURRENT LEVEL: COMMUNICATION, MLM
"Provider to review and advise if test should be repeated. Per result notes, \"Trung,   I have reviewed your labs, and they are all normal. If you have questions, please notify me through MyChart or a telephone call.   Sangeetha Salazar DNP\"  Yamel Choi RN, BSN     "
Left message asking patient to return call.    Please inform patient of message from Provider below.   Please assist in scheduling patient.    
Patient informed and scheduled  
Pt calling about his [place] test results, wondering if he needs to come back in 2 weeks to recheck? Please advise.   
Yes, the test can be repeated in 2 weeks. Please notify pt. Sangeetha Salazar   
0 = understands/communicates without difficulty

## 2019-04-25 NOTE — ED ADULT NURSE NOTE - OBJECTIVE STATEMENT
Pt brought to ED by daughter, presented with c/o wheezing since yesterday, 2 neb treatments given at home as per daughter. no acute distress noted, wheezing noted to bilateral lungs on ascultation. Skin intact, edema noted to bilateral lower extremities.

## 2019-04-25 NOTE — ED PROVIDER NOTE - MDM PATIENT STATEMENT FOR ADDL TREATMENT
Amel would like to know if you received a [employer] for the repair of her power chair and for a new power chair. Please advise.
Patient with one or more new problems requiring additional work-up/treatment.

## 2019-04-25 NOTE — ED PROVIDER NOTE - ATTENDING CONTRIBUTION TO CARE
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  She has been giving him albuterol and atrovent nebs with only mild relief.  No fever, chills, cp, back pain, n/v, abd pain.  Pt with dry cough.  Also noted to have increased swelling to bilateral lower ext L>R.  Well appearing, lying comfortably in stretcher, awake and alert, nontoxic.  Tachycardic, VSS.  Pt is speaking in full sentences, no increased work of breathing.  Lungs cta bl with scattered wheezing and coarse bs throughout.  Cards nl S1/S2, tachy, regular, no MRG.  Abd soft ntnd.  Bilat pitting pedal edema 2+ R>L, no calf tenderness.  Plan for ekg, rectal temp, labs, cxr, nebs, cta r/o pe given sob, tachycardia in pt with known malignancy.  Also consider pna vs viral uri vs fluid overload, reassess likely admit.

## 2019-04-25 NOTE — H&P ADULT - ASSESSMENT
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 L>R LE swelling. CT neg for PE and US neg for DVT. RVP + enterovirus

## 2019-04-25 NOTE — ED ADULT NURSE REASSESSMENT NOTE - NS ED NURSE REASSESS COMMENT FT1
repeat trop obtained and sent to lab. daughters communicates concern about coughing, MD SARAH Feldman aware, no new orders at this time, will cont to monitor.

## 2019-04-25 NOTE — ED ADULT NURSE REASSESSMENT NOTE - NS ED NURSE REASSESS COMMENT FT1
pt return from [place] as per daughter and transport personal after coughing pt had a blank stare and did not respond to his name being called for approx 10 seconds. MD SARAH Feldman made aware. pt placed on HM, currently at baseline. rr even and unlabored productive cough with white sputum. pt offers no new complaints at this time. will cont to monitor.

## 2019-04-25 NOTE — ED PROVIDER NOTE - PHYSICAL EXAMINATION
*GEN:   in no acute distress, AOx3  *EYES:   pupils equally round and reactive to light, extra-occular movements intact  *HEENT:   airway patent, moist mucosal membranes, full ROM neck  *CV:   regular rate and rhythm  *RESP:   expiratory wheezing auscultated throughout bilateral lungs, mildly increased work of breathing  *ABD:   soft, non-tender  *:   no cva/flank tenderness  *MSK:   no MSK tenderness or limited ROM  *SKIN:   dry, intact  *NEURO:   AOx3

## 2019-04-25 NOTE — ED PROVIDER NOTE - OBJECTIVE STATEMENT
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 also has occasional chest tightness. Also incr bilat leg swelling L > R.    Pt seen 3/8/19 for similar sx, was dc'd for outpt pcp and ct surg f/u.    PCP: Wero

## 2019-04-25 NOTE — H&P ADULT - NSHPREVIEWOFSYSTEMS_GEN_ALL_CORE
Review of Systems:   CONSTITUTIONAL: No fever, weight loss, or fatigue  EYES: No eye pain, visual disturbances, or discharge  ENMT:  No difficulty hearing, tinnitus, vertigo; No sinus or throat pain  NECK: No pain or stiffness  BREASTS: No pain, masses, or nipple discharge  RESPIRATORY: No chills or hemoptysis; +SOB/wheezing   CARDIOVASCULAR: No chest pain, palpitations, dizziness, or leg swelling  GASTROINTESTINAL: No abdominal or epigastric pain. No nausea, vomiting, or hematemesis; No diarrhea or constipation. No melena or hematochezia.  GENITOURINARY: No dysuria, frequency, hematuria, or incontinence  NEUROLOGICAL: No headaches, loss of strength, numbness, or tremors  SKIN: No itching, burning, rashes, or lesions   LYMPH NODES: No enlarged glands  ENDOCRINE: No heat or cold intolerance; No hair loss  MUSCULOSKELETAL: No joint pain or swelling; No muscle, back, or extremity pain  PSYCHIATRIC: No depression, anxiety, mood swings, or difficulty sleeping  HEME/LYMPH: No easy bruising, or bleeding gums  ALLERGY AND IMMUNOLOGIC: No hives or eczema

## 2019-04-25 NOTE — PROGRESS NOTE ADULT - SUBJECTIVE AND OBJECTIVE BOX
pt was evaluated by hospitalist earlier during the day   labs vitals consults reviewed no indicators present

## 2019-04-25 NOTE — H&P ADULT - NSHPLABSRESULTS_GEN_ALL_CORE
12.9   9.56  )-----------( 154      ( 25 Apr 2019 07:09 )             41.3       04-25    142  |  103  |  30<H>  ----------------------------<  129<H>  5.0   |  25  |  1.69<H>    Ca    10.0      25 Apr 2019 07:09  Mg     2.4     04-25    TPro  8.3  /  Alb  4.4  /  TBili  0.4  /  DBili  x   /  AST  37  /  ALT  35  /  AlkPhos  124<H>  04-25      CAPILLARY BLOOD GLUCOSE          Urinalysis Basic - ( 25 Apr 2019 13:19 )    Color: YELLOW / Appearance: CLEAR / SG: > 1.040 / pH: 6.0  Gluc: NEGATIVE / Ketone: NEGATIVE  / Bili: NEGATIVE / Urobili: NORMAL   Blood: SMALL / Protein: 20 / Nitrite: NEGATIVE   Leuk Esterase: LARGE / RBC: 6-10 / WBC >50   Sq Epi: OCC / Non Sq Epi: x / Bacteria: NEGATIVE            Radiology  < from: CT Angio Chest w/ IV Cont (04.25.19 @ 10:20) >    PROCEDURE DATE:  Apr 25 2019         INTERPRETATION:  Reason for Exam: History of lung surgery. Shortness of   breath    CTA of the chest was performed from the thoracic inlet to the level of   the adrenal glands following IV contrast injection of  90 cc of Omnipaque   350. No immediate complications were reported.  MIP images were also   created and reviewed.     Comparison: March 8, 2019    Tubes/Lines: None    Mediastinum and Heart: Thyroid gland is unremarkable. Aorta and pulmonary   arteries are normal in size. Coronary artery calcifications noted. No   pericardial effusion.    Lungs, Pleura, and Airways: Postsurgical changes in the left upper lobe   with thickening along the suture line. This exhibits decreased when   compared with the previous examination. Continued follow-up suggested.    Evaluation of the pulmonary vasculature is limited beyond the segmental   vessels. No pulmonary embolus to the level of the segmental arteries.   Significant respiratory motion noted in the lower lungs. Apparent filling   defect in the left lower lobe pulmonary artery segment on image 256 of   series 3 is likely volume averaging with the adjacent bronchus.    Visualized Abdomen: Bilateral renal cysts are noted. Upper abdomen is   otherwise unremarkable.    Bones and soft tissues: Degenerative changes noted throughout the spine.    IMPRESSION:    Evaluation of the pulmonary vasculature is limited beyond the segmental   vessels. No pulmonary embolus to the level of the segmental arteries.    Postsurgical changes in the left upper lobe with thickening along the   suture line. This exhibits decreased when compared with the previous   examination. Continued follow-up suggested.        < end of copied text >    < from: US Duplex Venous Lower Ext Complete, Bilateral (04.25.19 @ 09:16) >    EXAM:  US DPLX LWR EXT VEINS COMPL BI        PROCEDURE DATE:  Apr 25 2019        IMPRESSION:     No evidence of bilateral lower extremity deep venous thrombosis in the   visualized veins.    < end of copied text >

## 2019-04-25 NOTE — CONSULT NOTE ADULT - ASSESSMENT
86 year old male with HTN, Dementia, CAD with stents, DM2, Dyslipidemia, BPH, myxofibrosarcoma s/p resection and XRT presenting with cough, wheezing, chest congestion for the past 4 days, tried nebulizer treatments at home with no improvement so brought to the ED.    In the ED was febrile to 101.1F  WBC WNL  CT chest negative for PE, no PNA reported  LE edema per daughter secondary to Norvasc use  US negative for LE DVT bilaterally  RVP positive for entero/rhinovirus    Recommend:  -Continue to monitor off antibiotics  -Suspect wheezing from bronchospasm from viral infection  -F/U blood cultures    Will follow along with you.

## 2019-04-25 NOTE — H&P ADULT - NSICDXPASTSURGICALHX_GEN_ALL_CORE_FT
PAST SURGICAL HISTORY:  Direct inguinal hernia repair - left - 20 yrs ago     H/O arthroscopy of left knee - 2012     h/o bilat cataract repair -  3 yrs ago     History of sarcoma right chest wall - 2017    Prostate enlargement S/P Green light laser ablation of prostate; 04/2012    Stenosis of coronary stent x 3 last being 2011

## 2019-04-25 NOTE — ED ADULT TRIAGE NOTE - CHIEF COMPLAINT QUOTE
Patient c/o wheezing for few days worsening today. Patient reports taking 2 albuterol treatments prior to ED arrival. Patient c/o productive cough. 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 Currently not on any chemo/radiation. Hx. dementia, HTN. DM, stents x3. Patient c/o wheezing for few days worsening today. Patient reports taking 2 albuterol treatments prior to ED arrival. Patient c/o productive cough. 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 Currently not on any chemo/radiation. Hx. dementia, HTN. DM, stents x3.

## 2019-04-25 NOTE — H&P ADULT - NSHPPHYSICALEXAM_GEN_ALL_CORE
T(C): 37.5 (04-25-19 @ 13:41), Max: 38.4 (04-25-19 @ 07:17)  HR: 99 (04-25-19 @ 13:41) (91 - 103)  BP: 132/50 (04-25-19 @ 13:41) (106/32 - 132/50)  RR: 16 (04-25-19 @ 13:41) (16 - 17)  SpO2: 98% (04-25-19 @ 13:41) (98% - 99%)    PHYSICAL EXAM:  GENERAL: NAD, well-developed  HEAD:  Atraumatic, Normocephalic  EYES: EOMI, conjunctiva and sclera clear  NECK: Supple, No JVD  CHEST/LUNG: transmitted BS   HEART: Regular rate and rhythm; No murmurs, rubs, or gallops  ABDOMEN: Soft, Nontender, Nondistended; Bowel sounds present  EXTREMITIES:  LT >RT edema  PSYCH: AAOx1-2  NEUROLOGY: non-focal  SKIN: No rashes or lesions

## 2019-04-25 NOTE — H&P ADULT - HISTORY OF PRESENT ILLNESS
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

## 2019-04-25 NOTE — H&P ADULT - NSICDXPASTMEDICALHX_GEN_ALL_CORE_FT
PAST MEDICAL HISTORY:  Blood incompatibility with cadaveric donor     CAD (coronary artery disease)     Cataract     Dementia     Diabetes mellitus type II     Direct inguinal hernia     Glaucoma     H/O: HTN (hypertension)     H/O: osteoarthritis     History of BPH     History of short term memory loss     Incontinence of urine     Sarcoma     Urinary urgency

## 2019-04-25 NOTE — CONSULT NOTE ADULT - SUBJECTIVE AND OBJECTIVE BOX
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 No fevers at home. Per daughter at bedside, after resection in Feb, the patient has been off nebulizer treatment until this episode. He is feeling better. CT chest negative for PE, no PNA reported. He had a fever 101.1 F in the ED. WBC WNL. Remains with LE edema, per daughter started when he started Norvasc. No signs of cellulitis.    PAST MEDICAL & SURGICAL HISTORY:  Sarcoma  Dementia  CAD (coronary artery disease)  Glaucoma  Cataract  H/O: osteoarthritis  Direct inguinal hernia  History of short term memory loss  Incontinence of urine  Urinary urgency  History of BPH  Blood incompatibility with cadaveric donor  H/O: HTN (hypertension)  Diabetes mellitus type II  History of sarcoma: right chest wall -   Stenosis of coronary stent: x 3 last being   Prostate enlargement: S/P Green light laser ablation of prostate; 2012  h/o bilat cataract repair -  3 yrs ago  H/O arthroscopy of left knee -   Direct inguinal hernia repair - left - 20 yrs ago    Allergies    No Known Allergies    Intolerances    ANTIMICROBIALS:      OTHER MEDS:  acetaminophen   Tablet .. 650 milliGRAM(s) Oral every 6 hours PRN  ALBUTerol    0.083% 2.5 milliGRAM(s) Nebulizer every 6 hours  ALBUTerol    90 MICROgram(s) HFA Inhaler 1 Puff(s) Inhalation every 4 hours  amLODIPine   Tablet 10 milliGRAM(s) Oral daily  aspirin  chewable 81 milliGRAM(s) Oral daily  atorvastatin 20 milliGRAM(s) Oral at bedtime  donepezil 10 milliGRAM(s) Oral at bedtime  enoxaparin Injectable 30 milliGRAM(s) SubCutaneous daily  memantine 10 milliGRAM(s) Oral daily  multivitamin 1 Tablet(s) Oral daily  ondansetron Injectable 4 milliGRAM(s) IV Push every 6 hours PRN  tamsulosin 0.4 milliGRAM(s) Oral at bedtime  tiotropium 18 MICROgram(s) Capsule 1 Capsule(s) Inhalation daily  valsartan 160 milliGRAM(s) Oral daily    SOCIAL HISTORY: Former light smoker, social alcohol, no drug use. From [facility] in the [facility]. Lives with daughter and her family    FAMILY HISTORY:  Family history of lymphoma (Child, Sibling): sister alive , son   Family history of hypertension: mother, sister and brother  Family history of diabetes mellitus (Sibling): mother and brother    Drug Dosing Weight  Height (cm): 165.1 (2019 07:45)  Weight (kg): 88 (2019 07:45)  BMI (kg/m2): 32.3 (2019 07:45)  BSA (m2): 1.95 (2019 07:45)    PE:    Vital Signs Last 24 Hrs  T(C): 37.5 (2019 13:41), Max: 38.4 (2019 07:17)  T(F): 99.5 (2019 13:41), Max: 101.1 (2019 07:17)  HR: 99 (2019 13:41) (91 - 103)  BP: 132/50 (2019 13:41) (106/32 - 132/50)  BP(mean): --  RR: 16 (2019 13:41) (16 - 17)  SpO2: 98% (2019 13:41) (98% - 99%)    Gen: Awake, alert, NAD, non-toxic  CV: S1+S2 normal, no murmurs  Resp: Clear bilat, no resp distress  Abd: Soft, nontender, +BS  Ext: Bilateral LE edema with linear scratches, no wounds  : No Sanchez  IV/Skin: No thrombophlebitis  Msk: No low back pain, no arthralgias, no joint swelling  Neuro: No sensory deficits, no motor deficits    LABS:                          12.9   9.56  )-----------( 154      ( 2019 07:09 )             41.3       04-25    142  |  103  |  30<H>  ----------------------------<  129<H>  5.0   |  25  |  1.69<H>    Ca    10.0      2019 07:09  Mg     2.4         TPro  8.3  /  Alb  4.4  /  TBili  0.4  /  DBili  x   /  AST  37  /  ALT  35  /  AlkPhos  124<H>        Urinalysis Basic - ( 2019 13:19 )    Color: YELLOW / Appearance: CLEAR / SG: > 1.040 / pH: 6.0  Gluc: NEGATIVE / Ketone: NEGATIVE  / Bili: NEGATIVE / Urobili: NORMAL   Blood: SMALL / Protein: 20 / Nitrite: NEGATIVE   Leuk Esterase: LARGE / RBC: 6-10 / WBC >50   Sq Epi: OCC / Non Sq Epi: x / Bacteria: NEGATIVE    MICROBIOLOGY:  v    RADIOLOGY:  < from: CT Angio Chest w/ IV Cont (19 @ 10:20) >  IMPRESSION:    Evaluation of the pulmonary vasculature is limited beyond the segmental   vessels. No pulmonary embolus to the level of the segmental arteries.    Postsurgical changes in the left upper lobe with thickening along the   suture line. This exhibits decreased when compared with the previous   examination. Continued follow-up suggested.    < end of copied text >

## 2019-04-26 ENCOUNTER — TRANSCRIPTION ENCOUNTER (OUTPATIENT)
Age: 84
End: 2019-04-26

## 2019-04-26 VITALS
TEMPERATURE: 98 F | SYSTOLIC BLOOD PRESSURE: 128 MMHG | RESPIRATION RATE: 17 BRPM | HEART RATE: 81 BPM | OXYGEN SATURATION: 98 % | DIASTOLIC BLOOD PRESSURE: 72 MMHG

## 2019-04-26 LAB
ALBUMIN SERPL ELPH-MCNC: 3.1 G/DL — LOW (ref 3.3–5)
ALP SERPL-CCNC: 89 U/L — SIGNIFICANT CHANGE UP (ref 40–120)
ALT FLD-CCNC: 23 U/L — SIGNIFICANT CHANGE UP (ref 4–41)
ANION GAP SERPL CALC-SCNC: 12 MMO/L — SIGNIFICANT CHANGE UP (ref 7–14)
AST SERPL-CCNC: 23 U/L — SIGNIFICANT CHANGE UP (ref 4–40)
BASOPHILS # BLD AUTO: 0.02 K/UL — SIGNIFICANT CHANGE UP (ref 0–0.2)
BASOPHILS NFR BLD AUTO: 0.3 % — SIGNIFICANT CHANGE UP (ref 0–2)
BILIRUB SERPL-MCNC: 0.4 MG/DL — SIGNIFICANT CHANGE UP (ref 0.2–1.2)
BUN SERPL-MCNC: 24 MG/DL — HIGH (ref 7–23)
CALCIUM SERPL-MCNC: 8.8 MG/DL — SIGNIFICANT CHANGE UP (ref 8.4–10.5)
CHLORIDE SERPL-SCNC: 106 MMOL/L — SIGNIFICANT CHANGE UP (ref 98–107)
CO2 SERPL-SCNC: 25 MMOL/L — SIGNIFICANT CHANGE UP (ref 22–31)
CREAT SERPL-MCNC: 1.18 MG/DL — SIGNIFICANT CHANGE UP (ref 0.5–1.3)
EOSINOPHIL # BLD AUTO: 0.27 K/UL — SIGNIFICANT CHANGE UP (ref 0–0.5)
EOSINOPHIL NFR BLD AUTO: 3.6 % — SIGNIFICANT CHANGE UP (ref 0–6)
GLUCOSE SERPL-MCNC: 80 MG/DL — SIGNIFICANT CHANGE UP (ref 70–99)
HCT VFR BLD CALC: 35 % — LOW (ref 39–50)
HGB BLD-MCNC: 11 G/DL — LOW (ref 13–17)
IMM GRANULOCYTES NFR BLD AUTO: 0.4 % — SIGNIFICANT CHANGE UP (ref 0–1.5)
LYMPHOCYTES # BLD AUTO: 1.41 K/UL — SIGNIFICANT CHANGE UP (ref 1–3.3)
LYMPHOCYTES # BLD AUTO: 18.9 % — SIGNIFICANT CHANGE UP (ref 13–44)
MCHC RBC-ENTMCNC: 28.7 PG — SIGNIFICANT CHANGE UP (ref 27–34)
MCHC RBC-ENTMCNC: 31.4 % — LOW (ref 32–36)
MCV RBC AUTO: 91.4 FL — SIGNIFICANT CHANGE UP (ref 80–100)
MONOCYTES # BLD AUTO: 0.56 K/UL — SIGNIFICANT CHANGE UP (ref 0–0.9)
MONOCYTES NFR BLD AUTO: 7.5 % — SIGNIFICANT CHANGE UP (ref 2–14)
NEUTROPHILS # BLD AUTO: 5.18 K/UL — SIGNIFICANT CHANGE UP (ref 1.8–7.4)
NEUTROPHILS NFR BLD AUTO: 69.3 % — SIGNIFICANT CHANGE UP (ref 43–77)
NRBC # FLD: 0 K/UL — SIGNIFICANT CHANGE UP (ref 0–0)
PLATELET # BLD AUTO: 146 K/UL — LOW (ref 150–400)
PMV BLD: 10.6 FL — SIGNIFICANT CHANGE UP (ref 7–13)
POTASSIUM SERPL-MCNC: 4.1 MMOL/L — SIGNIFICANT CHANGE UP (ref 3.5–5.3)
POTASSIUM SERPL-SCNC: 4.1 MMOL/L — SIGNIFICANT CHANGE UP (ref 3.5–5.3)
PROT SERPL-MCNC: 6.3 G/DL — SIGNIFICANT CHANGE UP (ref 6–8.3)
RBC # BLD: 3.83 M/UL — LOW (ref 4.2–5.8)
RBC # FLD: 14.5 % — SIGNIFICANT CHANGE UP (ref 10.3–14.5)
SODIUM SERPL-SCNC: 143 MMOL/L — SIGNIFICANT CHANGE UP (ref 135–145)
SPECIMEN SOURCE: SIGNIFICANT CHANGE UP
SPECIMEN SOURCE: SIGNIFICANT CHANGE UP
WBC # BLD: 7.47 K/UL — SIGNIFICANT CHANGE UP (ref 3.8–10.5)
WBC # FLD AUTO: 7.47 K/UL — SIGNIFICANT CHANGE UP (ref 3.8–10.5)

## 2019-04-26 PROCEDURE — 99232 SBSQ HOSP IP/OBS MODERATE 35: CPT

## 2019-04-26 RX ORDER — TIOTROPIUM BROMIDE 18 UG/1
1 CAPSULE ORAL; RESPIRATORY (INHALATION)
Qty: 1 | Refills: 0
Start: 2019-04-26 | End: 2019-05-25

## 2019-04-26 RX ORDER — TAMSULOSIN HYDROCHLORIDE 0.4 MG/1
1 CAPSULE ORAL
Qty: 0 | Refills: 0 | COMMUNITY

## 2019-04-26 RX ORDER — ALBUTEROL 90 UG/1
2.5 AEROSOL, METERED ORAL ONCE
Qty: 0 | Refills: 0 | Status: COMPLETED | OUTPATIENT
Start: 2019-04-26 | End: 2019-04-26

## 2019-04-26 RX ORDER — VALSARTAN 80 MG/1
1 TABLET ORAL
Qty: 0 | Refills: 0 | COMMUNITY

## 2019-04-26 RX ORDER — ACETAMINOPHEN 500 MG
2 TABLET ORAL
Qty: 0 | Refills: 0 | COMMUNITY

## 2019-04-26 RX ADMIN — AMLODIPINE BESYLATE 10 MILLIGRAM(S): 2.5 TABLET ORAL at 06:54

## 2019-04-26 RX ADMIN — Medication 1 TABLET(S): at 13:39

## 2019-04-26 RX ADMIN — Medication 81 MILLIGRAM(S): at 13:39

## 2019-04-26 RX ADMIN — TIOTROPIUM BROMIDE 1 CAPSULE(S): 18 CAPSULE ORAL; RESPIRATORY (INHALATION) at 13:38

## 2019-04-26 RX ADMIN — ALBUTEROL 2.5 MILLIGRAM(S): 90 AEROSOL, METERED ORAL at 14:27

## 2019-04-26 RX ADMIN — Medication 650 MILLIGRAM(S): at 13:39

## 2019-04-26 RX ADMIN — Medication 650 MILLIGRAM(S): at 14:10

## 2019-04-26 RX ADMIN — ALBUTEROL 2.5 MILLIGRAM(S): 90 AEROSOL, METERED ORAL at 10:22

## 2019-04-26 RX ADMIN — VALSARTAN 160 MILLIGRAM(S): 80 TABLET ORAL at 06:54

## 2019-04-26 RX ADMIN — ENOXAPARIN SODIUM 30 MILLIGRAM(S): 100 INJECTION SUBCUTANEOUS at 13:39

## 2019-04-26 RX ADMIN — MEMANTINE HYDROCHLORIDE 10 MILLIGRAM(S): 10 TABLET ORAL at 13:39

## 2019-04-26 RX ADMIN — ALBUTEROL 2.5 MILLIGRAM(S): 90 AEROSOL, METERED ORAL at 05:00

## 2019-04-26 NOTE — DISCHARGE NOTE NURSING/CASE MANAGEMENT/SOCIAL WORK - NSDCDPATPORTLINK_GEN_ALL_CORE
You can access the [facility] Patient Portal, offered by [facility], by registering with the following website: [email or web address]

## 2019-04-26 NOTE — DISCHARGE NOTE PROVIDER - HOSPITAL COURSE
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    In the ED was febrile to 101.1F. WBC WNL            +Reactive airway dz- likely 2/2 Enterovirus + on RVP, CXR, CT WNL, negative for PE, no PNA reported, S/P Vanco and Zosyn x 1 dose. Albuterol nebs. ID consulted recommended no needs for antibiotics. Wheezing likely secondary to virus BCx negative at 24hrs.         +SHAD-likely secondary to dehydration, s/p IVF now Creatinine normalized         Patient stable and cleared for discharge d/w Dr. De Paz. 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    In the ED was febrile to 101.1F. WBC WNL            +Reactive airway dz- likely 2/2 Enterovirus + on RVP, CXR, CT WNL, negative for PE, no PNA reported, S/P Vanco and Zosyn x 1 dose. Albuterol nebs. ID consulted recommended no needs for antibiotics. Wheezing likely secondary to virus BCx negative at 24hrs. Patient sating well on RA 96%.

## 2019-04-26 NOTE — DISCHARGE NOTE PROVIDER - CARE PROVIDER_API CALL
Greg Renteria (MD)  Internal Medicine  [address]  [place] [address]  Phone: [phone]  Fax: [phone]  Follow Up Time: 1 week

## 2019-04-26 NOTE — PROGRESS NOTE ADULT - ASSESSMENT
86 year old male with HTN, Dementia, CAD with stents, DM2, Dyslipidemia, BPH, myxofibrosarcoma s/p resection and XRT presenting with cough, wheezing, chest congestion for the past 4 days, tried nebulizer treatments at home with no improvement so brought to the ED.    In the ED was febrile to 101.1F  WBC WNL  CT chest negative for PE, no PNA reported  LE edema per daughter secondary to Norvasc use  US negative for LE DVT bilaterally  RVP positive for entero/rhinovirus  Afebrile    Recommend:  -Continue to monitor off antibiotics  -Suspect wheezing from bronchospasm from viral infection  -F/U blood cultures - so far no growth to date  -DC planning    Please call with questions.

## 2019-04-26 NOTE — DISCHARGE NOTE PROVIDER - NSDCCPCAREPLAN_GEN_ALL_CORE_FT
PRINCIPAL DISCHARGE DIAGNOSIS  Diagnosis: Enteroviral infection  Assessment and Plan of Treatment: supportive care, continue nebulizers treatment      SECONDARY DISCHARGE DIAGNOSES  Diagnosis: SHAD (acute kidney injury)  Assessment and Plan of Treatment: likely secondary to dehydration, renal function now normalized    Diagnosis: Reactive airway disease without complication, unspecified asthma severity, unspecified whether persistent  Assessment and Plan of Treatment: likley secondary to enterovirus, supportive care, continue nebulizers treatment    Diagnosis: BPH (benign prostatic hyperplasia)  Assessment and Plan of Treatment: continue flomax    Diagnosis: Essential hypertension  Assessment and Plan of Treatment: Low sodium and fat diet, continue anti-hypertensive medications, and follow up with primary care physician.      Diagnosis: Sarcoma  Assessment and Plan of Treatment: outpatient followup    Diagnosis: CAD (coronary artery disease)  Assessment and Plan of Treatment: Continue aspirin   Continue low salt, fat, cholesterol and carbohydrate diet. Follow up with cardiologist and primary care physician's routine appointment.

## 2019-04-26 NOTE — PROGRESS NOTE ADULT - SUBJECTIVE AND OBJECTIVE BOX
CC: Patient is a 86y old  Male who presents with a chief complaint of shortness of breath (26 Apr 2019 12:06)    ID following for fever    Interval History/ROS: Patient feeling better today. No longer with fevers. No complaints today.    Rest of ROS negative.    Allergies  No Known Allergies    ANTIMICROBIALS:      OTHER MEDS:  acetaminophen   Tablet .. 650 milliGRAM(s) Oral every 6 hours PRN  ALBUTerol    0.083% 2.5 milliGRAM(s) Nebulizer every 6 hours  ALBUTerol    90 MICROgram(s) HFA Inhaler 1 Puff(s) Inhalation every 4 hours  amLODIPine   Tablet 10 milliGRAM(s) Oral daily  aspirin  chewable 81 milliGRAM(s) Oral daily  atorvastatin 20 milliGRAM(s) Oral at bedtime  donepezil 10 milliGRAM(s) Oral at bedtime  enoxaparin Injectable 30 milliGRAM(s) SubCutaneous daily  influenza   Vaccine 0.5 milliLiter(s) IntraMuscular once  memantine 10 milliGRAM(s) Oral daily  multivitamin 1 Tablet(s) Oral daily  ondansetron Injectable 4 milliGRAM(s) IV Push every 6 hours PRN  tamsulosin 0.4 milliGRAM(s) Oral at bedtime  tiotropium 18 MICROgram(s) Capsule 1 Capsule(s) Inhalation daily  valsartan 160 milliGRAM(s) Oral daily    PE:    Vital Signs Last 24 Hrs  T(C): 36.2 (26 Apr 2019 12:59), Max: 37.8 (25 Apr 2019 18:16)  T(F): 97.2 (26 Apr 2019 12:59), Max: 100.1 (25 Apr 2019 18:16)  HR: 78 (26 Apr 2019 12:59) (52 - 92)  BP: 141/59 (26 Apr 2019 12:59) (109/48 - 141/59)  BP(mean): --  RR: 17 (26 Apr 2019 12:59) (17 - 18)  SpO2: 98% (26 Apr 2019 12:59) (94% - 99%)    Gen: AOx3, NAD, non-toxic  CV: S1+S2 normal, no murmurs  Resp: Clear bilat, no resp distress  Abd: Soft, nontender, +BS  Ext: No LE edema, no wounds  : No Sanchez  IV/Skin: No thrombophlebitis  Neuro: no focal deficits    LABS:                          11.0   7.47  )-----------( 146      ( 26 Apr 2019 07:25 )             35.0       04-26    143  |  106  |  24<H>  ----------------------------<  80  4.1   |  25  |  1.18    Ca    8.8      26 Apr 2019 07:25  Mg     2.4     04-25    TPro  6.3  /  Alb  3.1<L>  /  TBili  0.4  /  DBili  x   /  AST  23  /  ALT  23  /  AlkPhos  89  04-26      Urinalysis Basic - ( 25 Apr 2019 13:19 )    Color: YELLOW / Appearance: CLEAR / SG: > 1.040 / pH: 6.0  Gluc: NEGATIVE / Ketone: NEGATIVE  / Bili: NEGATIVE / Urobili: NORMAL   Blood: SMALL / Protein: 20 / Nitrite: NEGATIVE   Leuk Esterase: LARGE / RBC: 6-10 / WBC >50   Sq Epi: OCC / Non Sq Epi: x / Bacteria: NEGATIVE    MICROBIOLOGY:  v  BLOOD  04-25-19 --  --  --      BLOOD VENOUS  04-25-19 --  --  --    RADIOLOGY:    < from: CT Angio Chest w/ IV Cont (04.25.19 @ 10:20) >  IMPRESSION:    Evaluation of the pulmonary vasculature is limited beyond the segmental   vessels. No pulmonary embolus to the level of the segmental arteries.    Postsurgical changes in the left upper lobe with thickening along the   suture line. This exhibits decreased when compared with the previous   examination. Continued follow-up suggested.      < end of copied text >

## 2019-04-27 LAB
BACTERIA UR CULT: SIGNIFICANT CHANGE UP
SPECIMEN SOURCE: SIGNIFICANT CHANGE UP

## 2019-04-30 LAB
BACTERIA BLD CULT: SIGNIFICANT CHANGE UP
BACTERIA BLD CULT: SIGNIFICANT CHANGE UP

## 2019-05-14 ENCOUNTER — APPOINTMENT (OUTPATIENT)
Dept: THORACIC SURGERY | Facility: CLINIC | Age: 84
End: 2019-05-14
Payer: MEDICARE

## 2019-05-16 ENCOUNTER — OUTPATIENT (OUTPATIENT)
Dept: OUTPATIENT SERVICES | Facility: HOSPITAL | Age: 84
LOS: 1 days | End: 2019-05-16

## 2019-05-16 VITALS
OXYGEN SATURATION: 98 % | HEART RATE: 88 BPM | TEMPERATURE: 99 F | WEIGHT: 179.9 LBS | DIASTOLIC BLOOD PRESSURE: 50 MMHG | RESPIRATION RATE: 14 BRPM | SYSTOLIC BLOOD PRESSURE: 116 MMHG | HEIGHT: 69 IN

## 2019-05-16 DIAGNOSIS — N40.0 BENIGN PROSTATIC HYPERPLASIA WITHOUT LOWER URINARY TRACT SYMPTOMS: Chronic | ICD-10-CM

## 2019-05-16 DIAGNOSIS — Z85.831 PERSONAL HISTORY OF MALIGNANT NEOPLASM OF SOFT TISSUE: Chronic | ICD-10-CM

## 2019-05-16 DIAGNOSIS — T82.855A STENOSIS OF CORONARY ARTERY STENT, INITIAL ENCOUNTER: Chronic | ICD-10-CM

## 2019-05-16 DIAGNOSIS — F03.90 UNSPECIFIED DEMENTIA WITHOUT BEHAVIORAL DISTURBANCE: ICD-10-CM

## 2019-05-16 DIAGNOSIS — R22.2 LOCALIZED SWELLING, MASS AND LUMP, TRUNK: ICD-10-CM

## 2019-05-16 DIAGNOSIS — I25.10 ATHEROSCLEROTIC HEART DISEASE OF NATIVE CORONARY ARTERY WITHOUT ANGINA PECTORIS: ICD-10-CM

## 2019-05-16 DIAGNOSIS — I10 ESSENTIAL (PRIMARY) HYPERTENSION: ICD-10-CM

## 2019-05-16 DIAGNOSIS — E11.9 TYPE 2 DIABETES MELLITUS WITHOUT COMPLICATIONS: ICD-10-CM

## 2019-05-16 LAB
HBA1C BLD-MCNC: 6 % — HIGH (ref 4–5.6)
HCT VFR BLD CALC: 39.4 % — SIGNIFICANT CHANGE UP (ref 39–50)
HGB BLD-MCNC: 12.4 G/DL — LOW (ref 13–17)
MCHC RBC-ENTMCNC: 28.7 PG — SIGNIFICANT CHANGE UP (ref 27–34)
MCHC RBC-ENTMCNC: 31.5 % — LOW (ref 32–36)
MCV RBC AUTO: 91.2 FL — SIGNIFICANT CHANGE UP (ref 80–100)
NRBC # FLD: 0 K/UL — SIGNIFICANT CHANGE UP (ref 0–0)
PLATELET # BLD AUTO: 165 K/UL — SIGNIFICANT CHANGE UP (ref 150–400)
PMV BLD: 10.5 FL — SIGNIFICANT CHANGE UP (ref 7–13)
RBC # BLD: 4.32 M/UL — SIGNIFICANT CHANGE UP (ref 4.2–5.8)
RBC # FLD: 13.7 % — SIGNIFICANT CHANGE UP (ref 10.3–14.5)
WBC # BLD: 4.69 K/UL — SIGNIFICANT CHANGE UP (ref 3.8–10.5)
WBC # FLD AUTO: 4.69 K/UL — SIGNIFICANT CHANGE UP (ref 3.8–10.5)

## 2019-05-16 RX ORDER — VALSARTAN 80 MG/1
1 TABLET ORAL
Qty: 0 | Refills: 0 | DISCHARGE

## 2019-05-16 RX ORDER — SODIUM CHLORIDE 9 MG/ML
1000 INJECTION, SOLUTION INTRAVENOUS
Refills: 0 | Status: DISCONTINUED | OUTPATIENT
Start: 2019-05-24 | End: 2019-06-08

## 2019-05-16 NOTE — H&P PST ADULT - NSICDXPROBLEM_GEN_ALL_CORE_FT
PROBLEM DIAGNOSES  Problem: Localized swelling, mass and lump, trunk  Assessment and Plan: PROBLEM DIAGNOSES  Problem: Localized swelling, mass and lump, trunk  Assessment and Plan: scheduled for wide excision with Ct Localization left paravertebral mass on 5/24/19  pending labs & last cardiac not with stress & echo reports  surgical scrub & preop instructions given & explained, pt & his daughter verbalized understanding    Problem: CAD (coronary artery disease)  Assessment and Plan: pt ha cardiac stents x 3; instructed to stay on Aspirin Therapy; pending last cardiac note    Problem: HTN (hypertension)  Assessment and Plan: instructed to take Amlodipine & Diovan AM DOS    Problem: DM (diabetes mellitus)  Assessment and Plan: FSBS stat AM of Surgery    Problem: Dementia  Assessment and Plan: instructed to take meds AM of surgery

## 2019-05-16 NOTE — H&P PST ADULT - RS GEN PE MLT RESP DETAILS PC
clear to auscultation bilaterally/no rales/breath sounds equal/no rhonchi/respirations non-labored/no wheezes

## 2019-05-16 NOTE — H&P PST ADULT - NSICDXPASTMEDICALHX_GEN_ALL_CORE_FT
PAST MEDICAL HISTORY:  Blood incompatibility with cadaveric donor     CAD (coronary artery disease)     Cataract     Dementia     Diabetes mellitus type II     Direct inguinal hernia     Glaucoma     H/O: HTN (hypertension)     H/O: osteoarthritis     History of BPH     History of short term memory loss     Incontinence of urine     Lung cancer     Sarcoma     Urinary urgency

## 2019-05-16 NOTE — H&P PST ADULT - NSCAFFEINETYPE_GEN_ALL_CORE_SD
"    Preventive Care at the 12 Month Visit  Growth Measurements & Percentiles  Head Circumference: 19\" (48.3 cm) (95 %, Source: WHO (Boys, 0-2 years)) 95 %ile based on WHO (Boys, 0-2 years) head circumference-for-age data using vitals from 9/12/2018.   Weight: 23 lbs 11.8 oz / 10.8 kg (actual weight) / 84 %ile based on WHO (Boys, 0-2 years) weight-for-age data using vitals from 9/12/2018.   Length: 2' 6.5\" / 77.5 cm 75 %ile based on WHO (Boys, 0-2 years) length-for-age data using vitals from 9/12/2018.   Weight for length: 81 %ile based on WHO (Boys, 0-2 years) weight-for-recumbent length data using vitals from 9/12/2018.    Your toddler s next Preventive Check-up will be at 15 months of age.      Development  At this age, your child may:    Pull himself to a stand and walk with help.    Take a few steps alone.    Use a pincer grasp to get something.    Point or bang two objects together and put one object inside another.    Say one to three meaningful words (besides  mama  and  lan ) correctly.    Start to understand that an object hidden by a cloth is still there (object permanence).    Play games like  peek-a-gore,   pat-a-cake  and  so-big  and wave  bye-bye.       Feeding Tips    Weaning from the bottle will protect your child s dental health.  Once your child can handle a cup (around 9 months of age), you can start taking him off the bottle.  Your goal should be to have your child off of the bottle by 12-15 months of age at the latest.  A  sippy cup  causes fewer problems than a bottle; an open cup is even better.    Your child may refuse to eat foods he used to like.  Your child may become very  picky  about what he will eat.  Offer foods, but do not make your child eat them.    Be aware of textures that your child can chew without choking/gagging.    You may give your child whole milk.  Your pediatric provider may discuss options other than whole milk.  Your child should drink less than 24 ounces of milk each " day.  If your child does not drink much milk, talk to your doctor about sources of calcium.    Limit the amount of fruit juice your child drinks to none or less than 4 ounces each day.    Brush your child s teeth with a small amount of fluoridated toothpaste one to two times each day.  Let your child play with the toothbrush after brushing.      Sleep    Your child will typically take two naps each day (most will decrease to one nap a day around 15-18 months old).    Your child may average about 13 hours of sleep each day.    Continue your regular nighttime routine which may include bathing, brushing teeth and reading.    Safety    Even if your child weighs more than 20 pounds, you should leave the car seat rear facing until your child is 2 years of age.    Falls at this age are common.  Keep mccollum on stairways and doors to dangerous areas.    Children explore by putting many things in the mouth.  Keep all medicines, cleaning supplies and poisons out of your child s reach.  Call the poison control center or your health care provider for directions in case your baby swallows poison.    Put the poison control number on all phones: 1-504.813.2866.    Keep electrical cords and harmful objects out of your child s reach.  Put plastic covers on unused electrical outlets.    Do not give your child small foods (such as peanuts, popcorn, pieces of hot dog or grapes) that could cause choking.    Turn your hot water heater to less than 120 degrees Fahrenheit.    Never put hot liquids near table or countertop edges.  Keep your child away from a hot stove, oven and furnace.    When cooking on the stove, turn pot handles to the inside and use the back burners.  When grilling, be sure to keep your child away from the grill.    Do not let your child be near running machines, lawn mowers or cars.    Never leave your child alone in the bathtub or near water.    What Your Child Needs    Your child can understand almost everything you  say.  He will respond to simple directions.  Do not swear or fight with your partner or other adults.  Your child will repeat what you say.    Show your child picture books.  Point to objects and name them.    Hold and cuddle your child as often as he will allow.    Encourage your child to play alone as well as with you and siblings.    Your child will become more independent.  He will say  I do  or  I can do it.   Let your child do as much as is possible.  Let him makes decisions as long as they are reasonable.    You will need to teach your child through discipline.  Teach and praise positive behaviors.  Protect him from harmful or poor behaviors.  Temper tantrums are common and should be ignored.  Make sure the child is safe during the tantrum.  If you give in, your child will throw more tantrums.    Never physically or emotionally hurt your child.  If you are losing control, take a few deep breaths, put your child in a safe place, and go into another room for a few minutes.  If possible, have someone else watch your child so you can take a break.  Call a friend, the Parent Warmline (592-035-5958) or call the Crisis Nursery ([phone]).      Dental Care    Your pediatric provider will speak with your regarding the need for regular dental appointments for cleanings and check-ups starting when your child s first tooth appears.      Your child may need fluoride supplements if you have well water.    Brush your child s teeth with a small amount (smaller than a pea) of fluoridated tooth paste once or twice daily.    Lab Work    Hemoglobin and lead levels will be checked.           decaf/coffee

## 2019-05-16 NOTE — H&P PST ADULT - GASTROINTESTINAL DETAILS
no distention/no bruit/no rebound tenderness/no rigidity/soft/bowel sounds normal/no masses palpable/no organomegaly/no guarding/nontender

## 2019-05-16 NOTE — H&P PST ADULT - NSICDXFAMILYHX_GEN_ALL_CORE_FT
FAMILY HISTORY:  Family history of hypertension, mother, sister and brother    Sibling  Still living? No  Family history of diabetes mellitus, Age at diagnosis: Age Unknown  Family history of lymphoma, Age at diagnosis: Age Unknown    Child  Still living? Unknown  Family history of lymphoma, Age at diagnosis: Age Unknown

## 2019-05-16 NOTE — H&P PST ADULT - HISTORY OF PRESENT ILLNESS
85 y/o  male with HTN, CAD- stented coronary artery, Dyslipidemia, T2DM (meds Dc'd on 03/2017) BPH, Dementia presents to PST for pre op evaluation accompanied by daughter Fidelina stated that follow up ct scan showed left lung mass.  Pt is now scheduled for Left Video Assisted Thoracoscopy, Left Upper Lobe Wedge Resection, Mediastinal Lymph Node Dissection on 02/21/19.  Of note pt s/p wide excision of the right posterior chest wall mass on 12/26/17 and 36 rounds of radiation therapy last being March 2018. 85 y/o  male with HTN, CAD- stented coronary artery, Dyslipidemia, T2DM (meds Dc'd on 03/2017) BPH, Dementia presents to PST for pre op evaluation accompanied by daughter Fidelina stated that follow up ct scan showed left lung mass.  Pt is now scheduled for Left Video Assisted Thoracoscopy, Left Upper Lobe Wedge Resection, Mediastinal Lymph Node Dissection on 02/21/19.  Of note pt s/p wide excision of the right posterior chest wall mass on 12/26/17 and 36 rounds of radiation therapy last being March 2018. Pt had recent follow up ct scan & PET scan done & left upper lobe thickening seen. Now scheduled for wide excision with Ct Localization left paravertebral mass on 5/24/19.

## 2019-05-20 ENCOUNTER — FORM ENCOUNTER (OUTPATIENT)
Age: 84
End: 2019-05-20

## 2019-05-21 ENCOUNTER — APPOINTMENT (OUTPATIENT)
Dept: THORACIC SURGERY | Facility: CLINIC | Age: 84
End: 2019-05-21
Payer: MEDICARE

## 2019-05-21 ENCOUNTER — APPOINTMENT (OUTPATIENT)
Dept: RADIOLOGY | Facility: HOSPITAL | Age: 84
End: 2019-05-21

## 2019-05-21 ENCOUNTER — OUTPATIENT (OUTPATIENT)
Dept: OUTPATIENT SERVICES | Facility: HOSPITAL | Age: 84
LOS: 1 days | End: 2019-05-21
Payer: MEDICARE

## 2019-05-21 ENCOUNTER — APPOINTMENT (OUTPATIENT)
Dept: INTERVENTIONAL RADIOLOGY/VASCULAR | Facility: CLINIC | Age: 84
End: 2019-05-21
Payer: MEDICARE

## 2019-05-21 VITALS — BODY MASS INDEX: 29.99 KG/M2 | RESPIRATION RATE: 16 BRPM | WEIGHT: 180 LBS | HEIGHT: 65 IN

## 2019-05-21 VITALS
SYSTOLIC BLOOD PRESSURE: 132 MMHG | TEMPERATURE: 98.3 F | HEART RATE: 61 BPM | DIASTOLIC BLOOD PRESSURE: 66 MMHG | OXYGEN SATURATION: 98 % | RESPIRATION RATE: 16 BRPM

## 2019-05-21 VITALS — HEART RATE: 67 BPM | OXYGEN SATURATION: 98 % | DIASTOLIC BLOOD PRESSURE: 57 MMHG | SYSTOLIC BLOOD PRESSURE: 124 MMHG

## 2019-05-21 DIAGNOSIS — N40.0 BENIGN PROSTATIC HYPERPLASIA WITHOUT LOWER URINARY TRACT SYMPTOMS: Chronic | ICD-10-CM

## 2019-05-21 DIAGNOSIS — C49.9 MALIGNANT NEOPLASM OF CONNECTIVE AND SOFT TISSUE, UNSPECIFIED: ICD-10-CM

## 2019-05-21 DIAGNOSIS — Z85.831 PERSONAL HISTORY OF MALIGNANT NEOPLASM OF SOFT TISSUE: Chronic | ICD-10-CM

## 2019-05-21 DIAGNOSIS — T82.855A STENOSIS OF CORONARY ARTERY STENT, INITIAL ENCOUNTER: Chronic | ICD-10-CM

## 2019-05-21 PROBLEM — C34.90 MALIGNANT NEOPLASM OF UNSPECIFIED PART OF UNSPECIFIED BRONCHUS OR LUNG: Chronic | Status: ACTIVE | Noted: 2019-05-16

## 2019-05-21 PROCEDURE — 99024 POSTOP FOLLOW-UP VISIT: CPT

## 2019-05-21 PROCEDURE — 71046 X-RAY EXAM CHEST 2 VIEWS: CPT | Mod: 26

## 2019-05-21 PROCEDURE — 99204 OFFICE O/P NEW MOD 45 MIN: CPT

## 2019-05-21 NOTE — HISTORY OF PRESENT ILLNESS
[FreeTextEntry1] : 86 years old male with hx of sarcoma of right posterior chest wall diagnosed in 2017. He underwent biopsy of the right chest mass in Nov 2017 and pathology was consistent with spindle cell neoplasm. 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 \par \par CT chest on 1/10/19: 3.1 cm EMANUEL mass increased in size when compared to previous exam, represents metastasis. He is s/p Lt VATS, robotic-assisted, wedge rxn of EMANUEL, drainage of Lt pleural effusion on  2/21/19 . \par \par Path of EMANUEL wedge revealed +Mets high grade malignant neoplasm c/w previous hx of myxofibrosarcoma, +vascular permeation, surgical margin negative. Lt Pleural fluid is negative for malignant cells.\par \par Patient was seen by Dr. Veras as well for resection/excision of the right paravertebral mass. \par \par Patient is referred by Dr. Veras for localization of the right paravertebral mass biopsy prior to resection. \par \par Patient sates she has been feeling well overall. Appetite has been good no unintentional weight loss.\par \par Denies any recent SOB, CP, fever, chills, n/v/d. \par \par \par Cardiac stents placed in 2012 on Aspirin 81mg. \par \par HCP is his daughter Fidelina Liu.

## 2019-05-21 NOTE — ASSESSMENT
[FreeTextEntry1] : PET/CT reviewed with the patient and his daughter. Plan is for CT/US guided needle localization of PET +  lesion in the right paravertebral muscle.  Risks of needle localization, including but not limited to bleeding, infection and injury to surrounding structure, discussed with the patient's daughter.  All questions were answered. Informed consent obtained from patient's daughter, who is his health care proxy.

## 2019-05-21 NOTE — PHYSICAL EXAM
[Alert] : alert [Normal Hearing] : hearing was normal [No Respiratory Distress] : no respiratory distress [Normal Rate] : heart rate was normal  [Not Tender] : non-tender [Soft] : abdomen soft [Not Distended] : not distended [Normal Gait] : normal gait [No Tremors] : no tremors [Oriented x3] : oriented to person, place, and time [No Accessory Muscle Use] : no accessory muscle use [Clear to Auscultation] : lungs were clear to auscultation bilaterally

## 2019-05-23 ENCOUNTER — TRANSCRIPTION ENCOUNTER (OUTPATIENT)
Age: 84
End: 2019-05-23

## 2019-05-23 NOTE — ASU PATIENT PROFILE, ADULT - PMH
Blood incompatibility with cadaveric donor    CAD (coronary artery disease)    Cataract    Dementia    Diabetes mellitus type II    Direct inguinal hernia    Glaucoma    H/O: HTN (hypertension)    H/O: osteoarthritis    History of BPH    History of short term memory loss    Incontinence of urine    Lung cancer    Sarcoma    Urinary urgency

## 2019-05-24 ENCOUNTER — RESULT REVIEW (OUTPATIENT)
Age: 84
End: 2019-05-24

## 2019-05-24 ENCOUNTER — OUTPATIENT (OUTPATIENT)
Dept: OUTPATIENT SERVICES | Facility: HOSPITAL | Age: 84
LOS: 1 days | Discharge: HOME | End: 2019-05-24
Payer: MEDICARE

## 2019-05-24 VITALS
SYSTOLIC BLOOD PRESSURE: 103 MMHG | OXYGEN SATURATION: 96 % | HEIGHT: 69 IN | HEART RATE: 57 BPM | WEIGHT: 179.9 LBS | RESPIRATION RATE: 16 BRPM | DIASTOLIC BLOOD PRESSURE: 70 MMHG | TEMPERATURE: 98 F

## 2019-05-24 VITALS — TEMPERATURE: 97 F

## 2019-05-24 DIAGNOSIS — R22.2 LOCALIZED SWELLING, MASS AND LUMP, TRUNK: ICD-10-CM

## 2019-05-24 DIAGNOSIS — T82.855A STENOSIS OF CORONARY ARTERY STENT, INITIAL ENCOUNTER: Chronic | ICD-10-CM

## 2019-05-24 DIAGNOSIS — Z85.831 PERSONAL HISTORY OF MALIGNANT NEOPLASM OF SOFT TISSUE: Chronic | ICD-10-CM

## 2019-05-24 DIAGNOSIS — N40.0 BENIGN PROSTATIC HYPERPLASIA WITHOUT LOWER URINARY TRACT SYMPTOMS: Chronic | ICD-10-CM

## 2019-05-24 LAB — GLUCOSE BLDC GLUCOMTR-MCNC: 76 MG/DL — SIGNIFICANT CHANGE UP (ref 70–99)

## 2019-05-24 PROCEDURE — 88305 TISSUE EXAM BY PATHOLOGIST: CPT | Mod: 26

## 2019-05-24 PROCEDURE — 88311 DECALCIFY TISSUE: CPT | Mod: 26

## 2019-05-24 RX ORDER — AMLODIPINE BESYLATE 2.5 MG/1
1 TABLET ORAL
Qty: 0 | Refills: 0 | DISCHARGE

## 2019-05-24 RX ORDER — ONDANSETRON 8 MG/1
4 TABLET, FILM COATED ORAL ONCE
Refills: 0 | Status: DISCONTINUED | OUTPATIENT
Start: 2019-05-24 | End: 2019-06-08

## 2019-05-24 RX ORDER — FENTANYL CITRATE 50 UG/ML
25 INJECTION INTRAVENOUS
Refills: 0 | Status: DISCONTINUED | OUTPATIENT
Start: 2019-05-24 | End: 2019-05-24

## 2019-05-24 RX ORDER — FENTANYL CITRATE 50 UG/ML
50 INJECTION INTRAVENOUS
Refills: 0 | Status: DISCONTINUED | OUTPATIENT
Start: 2019-05-24 | End: 2019-05-24

## 2019-05-24 RX ORDER — SODIUM CHLORIDE 9 MG/ML
1000 INJECTION, SOLUTION INTRAVENOUS
Refills: 0 | Status: COMPLETED | OUTPATIENT
Start: 2019-05-24 | End: 2019-05-24

## 2019-05-24 RX ADMIN — SODIUM CHLORIDE 30 MILLILITER(S): 9 INJECTION, SOLUTION INTRAVENOUS at 18:00

## 2019-05-24 NOTE — ASU DISCHARGE PLAN (ADULT/PEDIATRIC) - NURSING INSTRUCTIONS
DO NOT take any Tylenol (Acetaminophen) or narcotics containing Tylenol until after  11:00pm . You received Tylenol during your operation and it can cause damage to your liver if too much is taken within a 24 hour time period.

## 2019-05-24 NOTE — ED PROVIDER NOTE - CPE EDP EYES NORM
Subjective:  Patient presents today for follow-up after being seen in the ER for an episode of syncope on May 9. She states ever since that episode she has had a left temporal headache. She has taken more Imitrex in the last couple weeks then she thinks she's ever taken since she's had the prescription. She has been using Imitrex, Excedrin and ibuprofen and the headache just does not seem to want to go away. She has had no associated URI symptoms such as rhinorrhea, cough or sore throat. She denies any dental pain. She states this is the longest headache has lasted for her. She has also just felt dizzy on and off as well. She has not had any further syncopal episodes.    I have reviewed the patient's medications and allergies, updating these as appropriate.  See electronic medical record for a display of this information.    Current Outpatient Medications   Medication Sig Dispense Refill   • DULoxetine (CYMBALTA) 60 MG capsule Take 1 capsule by mouth daily. 90 capsule 1   • sumatriptan (IMITREX) 100 MG tablet Take 0.5 tablets by mouth as needed for Migraine. Take 1 tablet by mouth at onset of migraine. May repeat after 2 hours if needed. 9 tablet 2   • magnesium gluconate (MAGONATE) 500 MG tablet Take 500 mg by mouth 2 times daily.     • Riboflavin 400 MG Cap      • Cyanocobalamin (VITAMIN B 12 PO)      • ciclopirox (PENLAC) 8 % topical solution Apply topically nightly. 6.6 mL 3     No current facility-administered medications for this visit.           Objective:    Visit Vitals  /75 (BP Location: [facility] [place], Patient Position: Sitting, Cuff Size: Large Adult)   Pulse 86   Temp 98.1 °F (36.7 °C) (Tympanic)   Wt 89.8 kg   SpO2 98%   BMI 33.99 kg/m²       This is a very pleasant 41-year-old obese white female who appears her stated age and is in no acute distress. Examination of eyes reveals PERRLA and EOMI funduscopic is normal. TMs are clear bilaterally and pharynx is free of any erythema or exudate. Palpation of the  TMJ bilaterally does report is some moderate tenderness on the left. Neck is supple without any significant lymphadenopathy. Heart is in a regular rate and rhythm without murmur and lungs are clear to auscultation. The remainder of the neurological exam is nonfocal. She did have a normal noncontrast CT in the ER.    Impression/Plan:    Karina was seen today for follow-up.    Diagnoses and all orders for this visit:    New persistent daily headache  -     Cancel: MRI BRAIN; Future  -     MRI BRAIN; Future    TMJ tenderness, left    Preprocedural examination  -     CREATININE SERUM; Future        Because of her persistent headache I did recommend that we proceed with an MRI of the brain with and without contrast. In the meantime I would like her to try a bite block at night as I think she is having some TMJ discomfort as well and it could be contributing to the headaches. We will await the results of MRI.    Supervising Physician:  Dr. Cindy Gay   normal...

## 2019-05-24 NOTE — ASU DISCHARGE PLAN (ADULT/PEDIATRIC) - CALL YOUR DOCTOR IF YOU HAVE ANY OF THE FOLLOWING:
Swelling that gets worse/Pain not relieved by Medications Wound/Surgical Site with redness, or foul smelling discharge or pus/Bleeding that does not stop/Nausea and vomiting that does not stop/Unable to urinate/Fever greater than (need to indicate Fahrenheit or Celsius)/Pain not relieved by Medications/Swelling that gets worse/Inability to tolerate liquids or foods

## 2019-05-24 NOTE — ASU DISCHARGE PLAN (ADULT/PEDIATRIC) - ASU DC SPECIAL INSTRUCTIONSFT
Call the office to make an appointment with Dr. Veras in one week  Resume a regular diet and all of your home medications  You can take the outer dressing off in 48 hours and shower, leave the steri strips in place, they will fall off on their own  Take pain medication as needed as prescribed. Do not drive while taking pain medications

## 2019-05-29 LAB — SURGICAL PATHOLOGY STUDY: SIGNIFICANT CHANGE UP

## 2019-06-06 ENCOUNTER — OUTPATIENT (OUTPATIENT)
Dept: OUTPATIENT SERVICES | Facility: HOSPITAL | Age: 84
LOS: 1 days | End: 2019-06-06

## 2019-06-06 DIAGNOSIS — T82.855A STENOSIS OF CORONARY ARTERY STENT, INITIAL ENCOUNTER: Chronic | ICD-10-CM

## 2019-06-06 DIAGNOSIS — N40.0 BENIGN PROSTATIC HYPERPLASIA WITHOUT LOWER URINARY TRACT SYMPTOMS: Chronic | ICD-10-CM

## 2019-06-06 DIAGNOSIS — Z85.831 PERSONAL HISTORY OF MALIGNANT NEOPLASM OF SOFT TISSUE: Chronic | ICD-10-CM

## 2019-06-06 PROCEDURE — 10035 PLMT SFT TISS LOCLZJ DEV 1ST: CPT

## 2019-06-18 ENCOUNTER — APPOINTMENT (OUTPATIENT)
Dept: RADIATION ONCOLOGY | Facility: CLINIC | Age: 84
End: 2019-06-18
Payer: MEDICARE

## 2019-06-18 ENCOUNTER — OUTPATIENT (OUTPATIENT)
Dept: OUTPATIENT SERVICES | Facility: HOSPITAL | Age: 84
LOS: 1 days | Discharge: HOME | End: 2019-06-18
Payer: MEDICARE

## 2019-06-18 VITALS
RESPIRATION RATE: 16 BRPM | SYSTOLIC BLOOD PRESSURE: 128 MMHG | HEART RATE: 79 BPM | TEMPERATURE: 98.78 F | WEIGHT: 187.72 LBS | BODY MASS INDEX: 31.28 KG/M2 | HEIGHT: 65 IN | DIASTOLIC BLOOD PRESSURE: 64 MMHG | OXYGEN SATURATION: 98 %

## 2019-06-18 DIAGNOSIS — T82.855A STENOSIS OF CORONARY ARTERY STENT, INITIAL ENCOUNTER: Chronic | ICD-10-CM

## 2019-06-18 DIAGNOSIS — N40.0 BENIGN PROSTATIC HYPERPLASIA WITHOUT LOWER URINARY TRACT SYMPTOMS: Chronic | ICD-10-CM

## 2019-06-18 DIAGNOSIS — Z85.831 PERSONAL HISTORY OF MALIGNANT NEOPLASM OF SOFT TISSUE: Chronic | ICD-10-CM

## 2019-06-18 PROCEDURE — 99215 OFFICE O/P EST HI 40 MIN: CPT | Mod: 25

## 2019-06-18 RX ORDER — DONEPEZIL HYDROCHLORIDE 10 MG/1
10 TABLET ORAL
Qty: 90 | Refills: 0 | Status: ACTIVE | COMMUNITY
Start: 2019-04-19

## 2019-06-18 RX ORDER — COMPRESSOR, FOR NEBULIZER
EACH MISCELLANEOUS
Qty: 1 | Refills: 0 | Status: ACTIVE | COMMUNITY
Start: 2019-02-26

## 2019-06-18 RX ORDER — EVEROLIMUS 5 MG/1
5 TABLET ORAL
Qty: 28 | Refills: 0 | Status: DISCONTINUED | COMMUNITY
Start: 2019-04-05

## 2019-06-18 RX ORDER — IPRATROPIUM BROMIDE 0.5 MG/2.5ML
0.02 SOLUTION RESPIRATORY (INHALATION)
Qty: 62 | Refills: 0 | Status: ACTIVE | COMMUNITY
Start: 2019-03-12

## 2019-06-18 RX ORDER — OXYCODONE AND ACETAMINOPHEN 5; 325 MG/1; MG/1
5-325 TABLET ORAL
Qty: 7 | Refills: 0 | Status: DISCONTINUED | COMMUNITY
Start: 2019-05-24

## 2019-06-18 RX ORDER — MEMANTINE HYDROCHLORIDE 10 MG/1
10 TABLET, FILM COATED ORAL
Qty: 90 | Refills: 0 | Status: ACTIVE | COMMUNITY
Start: 2019-03-28

## 2019-06-18 RX ORDER — ALBUTEROL SULFATE 90 UG/1
108 (90 BASE) INHALANT RESPIRATORY (INHALATION)
Qty: 8 | Refills: 0 | Status: ACTIVE | COMMUNITY
Start: 2019-03-08

## 2019-06-18 RX ORDER — OXYCODONE 5 MG/1
5 TABLET ORAL
Qty: 42 | Refills: 0 | Status: DISCONTINUED | COMMUNITY
Start: 2019-02-23

## 2019-06-18 RX ORDER — AMOXICILLIN AND CLAVULANATE POTASSIUM 875; 125 MG/1; MG/1
875-125 TABLET, COATED ORAL
Qty: 14 | Refills: 0 | Status: DISCONTINUED | COMMUNITY
Start: 2019-02-14

## 2019-06-18 NOTE — VITALS
[Maximal Pain Intensity: 0/10] : 0/10 [Least Pain Intensity: 0/10] : 0/10 [60: Requires occasional assistance, but is able to care for most of his/her needs] : 60: Requires occasional assistance, but is able to care for most of his/her needs

## 2019-06-20 PROCEDURE — 77263 THER RADIOLOGY TX PLNG CPLX: CPT

## 2019-06-28 PROCEDURE — 77290 THER RAD SIMULAJ FIELD CPLX: CPT | Mod: 26

## 2019-07-12 PROCEDURE — 77295 3-D RADIOTHERAPY PLAN: CPT | Mod: 26

## 2019-07-12 PROCEDURE — 77334 RADIATION TREATMENT AID(S): CPT | Mod: 26

## 2019-07-12 PROCEDURE — 77300 RADIATION THERAPY DOSE PLAN: CPT | Mod: 26

## 2019-07-13 NOTE — REASON FOR VISIT
[Other: _____] : [unfilled] [Consideration for Non-Curative Therapy] : consideration for non-curative therapy for [Other: ___] : [unfilled] [Re-evaluation] : re-evaluation for

## 2019-07-16 PROCEDURE — 77280 THER RAD SIMULAJ FIELD SMPL: CPT | Mod: 26

## 2019-07-17 NOTE — ED PROVIDER NOTE - PSH
Direct inguinal hernia repair - left - 20 yrs ago    H/O arthroscopy of left knee - 2012    h/o bilat cataract repair -  3 yrs ago    History of sarcoma  right chest wall - 2017  Prostate enlargement  S/P Green light laser ablation of prostate; 04/2012  Stenosis of coronary stent  x 3 last being 2011 [FreeTextEntry1] : 70M with PAD and claudication, DM, HTN, HLD, CAD s/p PCI (RCA), former smoker who presents for follow up. Seeing vascular surgery, leg pain thought to be combination of claudication with neuropathy.  Compliant with DAPT, no bleeding complications. Continued bilateral calf pain with walking, 1 block tolerance, improves with rest.  Has been told by vascular surgery that he is not a candidate for revascularization. Pending MRI of the lumbar spine to assess if any neurologic cause for LE symptoms. \par \par In October 2018, pt was seen in the office with worsening exertional chest pain, was sent for cardiac cath and was noted with 99% RCA occlusion s/p GELA.  \par \par ECG: SR, no ST-T wave changes\par Cath 2018 (RRA): 100% RCA, other arteries normal\par TTE 9/2017: Normal , EF 78%\par LE Duplex: Moderate fem-pop disease bilaterally\par NST (2009): Mild inferior ischemia, EF 58%\par \par Asa 81mg, Clopidogrel 75mg, Losartan 50mg, Metoprolol 12.5mg BID, Trental 400mg TID

## 2019-07-22 VITALS
HEART RATE: 81 BPM | RESPIRATION RATE: 16 BRPM | TEMPERATURE: 97.7 F | DIASTOLIC BLOOD PRESSURE: 57 MMHG | SYSTOLIC BLOOD PRESSURE: 98 MMHG | OXYGEN SATURATION: 95 %

## 2019-07-22 NOTE — VITALS
[Maximal Pain Intensity: 6/10] : 6/10 [Pain Location: ___] : Pain Location: [unfilled] [70: Cares for self; unalbe to carry on normal activity or do active work.] : 70: Cares for self; unable to carry on normal activity or do active work.

## 2019-07-23 PROCEDURE — 77427 RADIATION TX MANAGEMENT X5: CPT

## 2019-07-29 VITALS
TEMPERATURE: 98.24 F | HEART RATE: 9 BPM | DIASTOLIC BLOOD PRESSURE: 76 MMHG | BODY MASS INDEX: 31.31 KG/M2 | HEIGHT: 65 IN | OXYGEN SATURATION: 97 % | WEIGHT: 187.94 LBS | RESPIRATION RATE: 16 BRPM | SYSTOLIC BLOOD PRESSURE: 127 MMHG

## 2019-07-29 NOTE — REVIEW OF SYSTEMS
[Fatigue: Grade 1 - Fatigue relieved by rest] : Fatigue: Grade 1 - Fatigue relieved by rest [Skin Hyperpigmentation: Grade 1 - Hyperpigmentation covering <10% BSA; no psychosocial impact] : Skin Hyperpigmentation: Grade 1 - Hyperpigmentation covering <10% BSA; no psychosocial impact [Dermatitis Radiation: Grade 0] : Dermatitis Radiation: Grade 0

## 2019-07-29 NOTE — VITALS
[Maximal Pain Intensity: 10/10] : 10/10 [Pain Location: ___] : Pain Location: [unfilled] [60: Requires occasional assistance, but is able to care for most of his/her needs] : 60: Requires occasional assistance, but is able to care for most of his/her needs

## 2019-07-30 PROCEDURE — 77427 RADIATION TX MANAGEMENT X5: CPT

## 2019-08-05 VITALS
SYSTOLIC BLOOD PRESSURE: 124 MMHG | DIASTOLIC BLOOD PRESSURE: 62 MMHG | RESPIRATION RATE: 16 BRPM | HEART RATE: 63 BPM | TEMPERATURE: 97.7 F | OXYGEN SATURATION: 97 %

## 2019-08-05 NOTE — REVIEW OF SYSTEMS
[Fatigue: Grade 1 - Fatigue relieved by rest] : Fatigue: Grade 1 - Fatigue relieved by rest [Dermatitis Radiation: Grade 0] : Dermatitis Radiation: Grade 0 [Skin Hyperpigmentation: Grade 1 - Hyperpigmentation covering <10% BSA; no psychosocial impact] : Skin Hyperpigmentation: Grade 1 - Hyperpigmentation covering <10% BSA; no psychosocial impact

## 2019-08-06 PROCEDURE — 77427 RADIATION TX MANAGEMENT X5: CPT

## 2019-08-11 NOTE — HISTORY OF PRESENT ILLNESS
[FreeTextEntry1] : Mr. 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 Denies back pain. Using calendula for skin care.\par \par 7/22/19 OTV 4/33 fractions received. He denies back area treatment site radiation associated pain. Encouraged to use moisturizer for skin care, sample of calendula provided.

## 2019-08-11 NOTE — DISEASE MANAGEMENT
[Pathological] : TNM Stage: p [IV] : IV [TTNM] : 2bx [NTNM] : x [MTNM] : 1 [de-identified] : 800cGy [de-identified] : 0188uWe [de-identified] : Posterior chest wall

## 2019-08-11 NOTE — VITALS
[Pain Location: ___] : Pain Location: [unfilled] [60: Requires occasional assistance, but is able to care for most of his/her needs] : 60: Requires occasional assistance, but is able to care for most of his/her needs [Maximal Pain Intensity: 4/10] : 4/10 [Least Pain Intensity: 0/10] : 0/10

## 2019-08-11 NOTE — DISEASE MANAGEMENT
[Pathological] : TNM Stage: p [IV] : IV [TTNM] : 2bx [NTNM] : x [MTNM] : 1 [de-identified] : Posterior chest wall

## 2019-08-11 NOTE — PHYSICAL EXAM
[General Appearance - Alert] : alert [General Appearance - In No Acute Distress] : in no acute distress [de-identified] : mild hyperpigmentation to treated area [de-identified] : oriented to person and place

## 2019-08-11 NOTE — PHYSICAL EXAM
[General Appearance - Alert] : alert [General Appearance - In No Acute Distress] : in no acute distress [de-identified] : mild hyperpigmentation to treated area [de-identified] : oriented to person and place

## 2019-08-11 NOTE — HISTORY OF PRESENT ILLNESS
[FreeTextEntry1] : Mr. 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 Denies back pain. Using calendula for skin care.\par \par 7/29/19- 9/33 fx\par Notes bilateral leg pain 10/10 for which he takes tylenol as needed. Denies back pain. Using calendula for skin care.\par \par 7/22/19 OTV 4/33 fractions received. He denies back area treatment site radiation associated pain. Encouraged to use moisturizer for skin care, sample of calendula provided.

## 2019-08-12 VITALS — WEIGHT: 174.6 LBS | BODY MASS INDEX: 29.06 KG/M2

## 2019-08-12 VITALS
SYSTOLIC BLOOD PRESSURE: 122 MMHG | HEIGHT: 65 IN | HEART RATE: 65 BPM | TEMPERATURE: 97.52 F | WEIGHT: 174 LBS | BODY MASS INDEX: 28.99 KG/M2 | OXYGEN SATURATION: 99 % | DIASTOLIC BLOOD PRESSURE: 67 MMHG | RESPIRATION RATE: 16 BRPM

## 2019-08-12 NOTE — REVIEW OF SYSTEMS
[Fatigue: Grade 1 - Fatigue relieved by rest] : Fatigue: Grade 1 - Fatigue relieved by rest [Skin Hyperpigmentation: Grade 1 - Hyperpigmentation covering <10% BSA; no psychosocial impact] : Skin Hyperpigmentation: Grade 1 - Hyperpigmentation covering <10% BSA; no psychosocial impact [Dyspnea: Grade 0] : Dyspnea: Grade 0 [Cough: Grade 0] : Cough: Grade 0 [Dermatitis Radiation: Grade 1 - Faint erythema or dry desquamation] : Dermatitis Radiation: Grade 1 - Faint erythema or dry desquamation

## 2019-08-12 NOTE — VITALS
[Least Pain Intensity: 0/10] : 0/10 [Pain Location: ___] : Pain Location: [unfilled] [60: Requires occasional assistance, but is able to care for most of his/her needs] : 60: Requires occasional assistance, but is able to care for most of his/her needs [Maximal Pain Intensity: 0/10] : 0/10

## 2019-08-13 PROCEDURE — 77427 RADIATION TX MANAGEMENT X5: CPT

## 2019-08-13 NOTE — HISTORY OF PRESENT ILLNESS
[FreeTextEntry1] : Mr. 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 He denies back area treatment site radiation associated pain. Encouraged to use moisturizer for skin care, sample of calendula provided.

## 2019-08-13 NOTE — PHYSICAL EXAM
[Normal] : well developed, well nourished, in no acute distress [de-identified] : palpable R paravertebral mass, non tender

## 2019-08-13 NOTE — DISEASE MANAGEMENT
[Pathological] : TNM Stage: p [IV] : IV [TTNM] : 2bx [NTNM] : x [MTNM] : 1 [de-identified] : 800cGy [de-identified] : 3759aHm [de-identified] : Posterior chest wall

## 2019-08-14 NOTE — HISTORY OF PRESENT ILLNESS
[FreeTextEntry1] : Mr. 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 Denies pain. Using calendula for skin care.\par \par 8/5/19- 14/33 fx\par Notes fatigue and mild leg pain. Denies back pain. Using calendula for skin care.\par \par 7/29/19- 9/33 fx\par Notes bilateral leg pain 10/10 for which he takes tylenol as needed. Denies back pain. Using calendula for skin care.\par \par 7/22/19 OTV 4/33 fractions received. He denies back area treatment site radiation associated pain. Encouraged to use moisturizer for skin care, sample of calendula provided.

## 2019-08-14 NOTE — DISEASE MANAGEMENT
[Pathological] : TNM Stage: p [IV] : IV [TTNM] : 2bx [NTNM] : x [MTNM] : 1 [de-identified] : right posterior/paravertebral chest wall

## 2019-08-14 NOTE — PHYSICAL EXAM
[General Appearance - Alert] : alert [General Appearance - In No Acute Distress] : in no acute distress [de-identified] : mild hyperpigmentation to treated area [de-identified] : oriented to person and place

## 2019-08-19 VITALS
TEMPERATURE: 98.4 F | HEART RATE: 63 BPM | RESPIRATION RATE: 16 BRPM | DIASTOLIC BLOOD PRESSURE: 68 MMHG | SYSTOLIC BLOOD PRESSURE: 125 MMHG | BODY MASS INDEX: 29.51 KG/M2 | WEIGHT: 177.36 LBS | OXYGEN SATURATION: 96 %

## 2019-08-19 NOTE — REVIEW OF SYSTEMS
[Fatigue: Grade 1 - Fatigue relieved by rest] : Fatigue: Grade 1 - Fatigue relieved by rest [Cough: Grade 0] : Cough: Grade 0 [Dyspnea: Grade 0] : Dyspnea: Grade 0 [Skin Hyperpigmentation: Grade 1 - Hyperpigmentation covering <10% BSA; no psychosocial impact] : Skin Hyperpigmentation: Grade 1 - Hyperpigmentation covering <10% BSA; no psychosocial impact [Dermatitis Radiation: Grade 1 - Faint erythema or dry desquamation] : Dermatitis Radiation: Grade 1 - Faint erythema or dry desquamation

## 2019-08-20 PROCEDURE — 77427 RADIATION TX MANAGEMENT X5: CPT

## 2019-08-26 VITALS
HEIGHT: 69 IN | TEMPERATURE: 96 F | OXYGEN SATURATION: 96 % | RESPIRATION RATE: 15 BRPM | DIASTOLIC BLOOD PRESSURE: 64 MMHG | HEART RATE: 64 BPM | WEIGHT: 161.71 LBS | BODY MASS INDEX: 23.95 KG/M2 | SYSTOLIC BLOOD PRESSURE: 136 MMHG

## 2019-08-27 ENCOUNTER — OTHER (OUTPATIENT)
Age: 84
End: 2019-08-27

## 2019-08-27 PROCEDURE — 77427 RADIATION TX MANAGEMENT X5: CPT

## 2019-08-29 ENCOUNTER — APPOINTMENT (OUTPATIENT)
Dept: NUCLEAR MEDICINE | Facility: IMAGING CENTER | Age: 84
End: 2019-08-29

## 2019-09-02 NOTE — DISEASE MANAGEMENT
[Pathological] : TNM Stage: p [IV] : IV [TTNM] : 2bx [NTNM] : x [MTNM] : 1 [de-identified] : right posterior/paravertebral chest wall

## 2019-09-02 NOTE — PHYSICAL EXAM
[General Appearance - Alert] : alert [General Appearance - In No Acute Distress] : in no acute distress [de-identified] : moderate hyperpigmentation to treated area [de-identified] : oriented to person and place

## 2019-09-02 NOTE — HISTORY OF PRESENT ILLNESS
[FreeTextEntry1] : Mr. 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 Tolerating treatment. Using calendula for skin care.\par \par 8/12/19- 19/33 fx\par Feeling well. Denies pain. Using calendula for skin care.\par \par 8/5/19- 14/33 fx\par Notes fatigue and mild leg pain. Denies back pain. Using calendula for skin care.\par \par 7/29/19- 9/33 fx\par Notes bilateral leg pain 10/10 for which he takes tylenol as needed. Denies back pain. Using calendula for skin care.\par \par 7/22/19 OTV 4/33 fractions received. He denies back area treatment site radiation associated pain. Encouraged to use moisturizer for skin care, sample of calendula provided.

## 2019-09-02 NOTE — PHYSICAL EXAM
[General Appearance - Alert] : alert [General Appearance - In No Acute Distress] : in no acute distress [de-identified] : mild hyperpigmentation to treated area [de-identified] : oriented to person and place

## 2019-09-02 NOTE — HISTORY OF PRESENT ILLNESS
[FreeTextEntry1] : Mr. 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 Tolerating treatment. \par \par 8/19/19- 0/10 fx CD Right paravertebral CW\par Denies pain.\par \par 8/12/19- 19/33 fx\par Feeling well. Denies pain. Using calendula for skin care.\par \par 8/5/19- 14/33 fx\par Notes fatigue and mild leg pain. Denies back pain. Using calendula for skin care.\par \par 7/29/19- 9/33 fx\par Notes bilateral leg pain 10/10 for which he takes tylenol as needed. Denies back pain. Using calendula for skin care.\par \par 7/22/19 OTV 4/33 fractions received. He denies back area treatment site radiation associated pain. Encouraged to use moisturizer for skin care, sample of calendula provided.

## 2019-09-02 NOTE — REVIEW OF SYSTEMS
[Fatigue: Grade 1 - Fatigue relieved by rest] : Fatigue: Grade 1 - Fatigue relieved by rest [Cough: Grade 0] : Cough: Grade 0 [Dyspnea: Grade 0] : Dyspnea: Grade 0 [Skin Hyperpigmentation: Grade 1 - Hyperpigmentation covering <10% BSA; no psychosocial impact] : Skin Hyperpigmentation: Grade 1 - Hyperpigmentation covering <10% BSA; no psychosocial impact [Dermatitis Radiation: Grade 0] : Dermatitis Radiation: Grade 0 [Skin Induration: Grade 0] : Skin Induration: Grade 0

## 2019-09-02 NOTE — DISEASE MANAGEMENT
[Pathological] : TNM Stage: p [IV] : IV [TTNM] : 2bx [NTNM] : x [MTNM] : 1 [de-identified] : right posterior/paravertebral chest wall

## 2019-09-05 ENCOUNTER — APPOINTMENT (OUTPATIENT)
Dept: THORACIC SURGERY | Facility: CLINIC | Age: 84
End: 2019-09-05

## 2019-10-11 ENCOUNTER — APPOINTMENT (OUTPATIENT)
Dept: RADIATION ONCOLOGY | Facility: CLINIC | Age: 84
End: 2019-10-11
Payer: MEDICARE

## 2019-10-11 VITALS
OXYGEN SATURATION: 99 % | BODY MASS INDEX: 27.74 KG/M2 | SYSTOLIC BLOOD PRESSURE: 128 MMHG | WEIGHT: 187.83 LBS | DIASTOLIC BLOOD PRESSURE: 80 MMHG | HEART RATE: 79 BPM | RESPIRATION RATE: 18 BRPM

## 2019-10-11 PROCEDURE — 99024 POSTOP FOLLOW-UP VISIT: CPT | Mod: GC

## 2019-10-11 NOTE — VITALS
[Maximal Pain Intensity: 0/10] : 0/10 [Least Pain Intensity: 0/10] : 0/10 [60: Requires occasional assistance, but is able to care for most of his/her needs] : 60: Requires occasional assistance, but is able to care for most of his/her needs [ECOG Performance Status: 3 - Capable of only limited self care, confined to bed or chair more than 50% of waking hours] : Performance Status: 3 - Capable of only limited self care, confined to bed or chair more than 50% of waking hours

## 2019-10-30 ENCOUNTER — FORM ENCOUNTER (OUTPATIENT)
Age: 84
End: 2019-10-30

## 2019-10-31 ENCOUNTER — APPOINTMENT (OUTPATIENT)
Dept: NUCLEAR MEDICINE | Facility: IMAGING CENTER | Age: 84
End: 2019-10-31
Payer: MEDICARE

## 2019-10-31 ENCOUNTER — OUTPATIENT (OUTPATIENT)
Dept: OUTPATIENT SERVICES | Facility: HOSPITAL | Age: 84
LOS: 1 days | End: 2019-10-31
Payer: MEDICARE

## 2019-10-31 DIAGNOSIS — Z85.831 PERSONAL HISTORY OF MALIGNANT NEOPLASM OF SOFT TISSUE: Chronic | ICD-10-CM

## 2019-10-31 DIAGNOSIS — T82.855A STENOSIS OF CORONARY ARTERY STENT, INITIAL ENCOUNTER: Chronic | ICD-10-CM

## 2019-10-31 DIAGNOSIS — N40.0 BENIGN PROSTATIC HYPERPLASIA WITHOUT LOWER URINARY TRACT SYMPTOMS: Chronic | ICD-10-CM

## 2019-10-31 DIAGNOSIS — C49.9 MALIGNANT NEOPLASM OF CONNECTIVE AND SOFT TISSUE, UNSPECIFIED: ICD-10-CM

## 2019-10-31 PROCEDURE — 78815 PET IMAGE W/CT SKULL-THIGH: CPT

## 2019-10-31 PROCEDURE — 78815 PET IMAGE W/CT SKULL-THIGH: CPT | Mod: 26,PS

## 2019-10-31 PROCEDURE — A9552: CPT

## 2019-11-19 NOTE — HISTORY OF PRESENT ILLNESS
[FreeTextEntry1] : Mr. Liu is a 86 year old man with a history of a pT3 G3 myxofibrosarcoma of the right posterior chest wall who underwent resection in December 2017 followed by adjuvant radiation therapy to 66 Gy completed in May 2018 and more recently recurrent disease in the right paravertebral region for which he underwent resection in May 2019 with close 2 mm margins and underwent adjuvant radiation therapy to 66 Gy completed 8/30/19.\par \par He tolerated treatment well.\par \par He presents for post-treatment evaluation.  He has some irritation of the area with activities such as bathing.  Utilizes skin cream for the area.  Otherwise denies issues with breathing and with moving his extremities.\par

## 2019-11-19 NOTE — REVIEW OF SYSTEMS
[SOB on Exertion] : shortness of breath during exertion [Negative] : Cardiovascular [Cough: Grade 0] : Cough: Grade 0 [Dyspnea: Grade 1 - Shortness of breath with moderate exertion] : Dyspnea: Grade 1 - Shortness of breath with moderate exertion [Dermatitis Radiation: Grade 1 - Faint erythema or dry desquamation] : Dermatitis Radiation: Grade 1 - Faint erythema or dry desquamation [Fatigue: Grade 0] : Fatigue: Grade 0 [Skin Atrophy: Grade 0] : Skin Atrophy: Grade 0 [Skin Hyperpigmentation: Grade 1 - Hyperpigmentation covering <10% BSA; no psychosocial impact] : Skin Hyperpigmentation: Grade 1 - Hyperpigmentation covering <10% BSA; no psychosocial impact [FreeTextEntry7] : occ incontinent [FreeTextEntry8] : incontinent

## 2019-11-19 NOTE — PHYSICAL EXAM
[Normal] : oriented to person, place and time, the affect was normal, the mood was normal and not anxious [de-identified] : scar well healed, some hyperpigmentation [de-identified] : hyperpigmentation over right paravertebral area

## 2019-11-19 NOTE — DISEASE MANAGEMENT
[Pathological] : TNM Stage: p [N/A] : Currently not applicable [FreeTextEntry4] : Recurrent [TTNM] : x [NTNM] : x [MTNM] : x

## 2019-12-02 PROBLEM — R22.2 PARAVERTEBRAL MASS: Status: ACTIVE | Noted: 2019-05-20

## 2019-12-02 PROBLEM — R91.8 LUNG MASS: Status: ACTIVE | Noted: 2019-01-29

## 2019-12-05 ENCOUNTER — APPOINTMENT (OUTPATIENT)
Dept: THORACIC SURGERY | Facility: CLINIC | Age: 84
End: 2019-12-05
Payer: MEDICARE

## 2019-12-05 VITALS
BODY MASS INDEX: 27.62 KG/M2 | SYSTOLIC BLOOD PRESSURE: 137 MMHG | RESPIRATION RATE: 18 BRPM | HEART RATE: 77 BPM | WEIGHT: 187 LBS | DIASTOLIC BLOOD PRESSURE: 83 MMHG

## 2019-12-05 DIAGNOSIS — R91.8 OTHER NONSPECIFIC ABNORMAL FINDING OF LUNG FIELD: ICD-10-CM

## 2019-12-05 DIAGNOSIS — R22.2 LOCALIZED SWELLING, MASS AND LUMP, TRUNK: ICD-10-CM

## 2019-12-05 PROCEDURE — 99213 OFFICE O/P EST LOW 20 MIN: CPT

## 2019-12-06 NOTE — PHYSICAL EXAM
[Auscultation Breath Sounds / Voice Sounds] : lungs were clear to auscultation bilaterally [Heart Rate And Rhythm] : heart rate was normal and rhythm regular [Heart Sounds] : normal S1 and S2 [Murmurs] : no murmurs [Heart Sounds Gallop] : no gallops [Heart Sounds Pericardial Friction Rub] : no pericardial rub [Examination Of The Chest] : the chest was normal in appearance [Chest Visual Inspection Thoracic Asymmetry] : no chest asymmetry [Diminished Respiratory Excursion] : normal chest expansion [Bowel Sounds] : normal bowel sounds [Abdomen Tenderness] : non-tender [Abdomen Soft] : soft [Abdomen Mass (___ Cm)] : no abdominal mass palpated [Skin Turgor] : normal skin turgor [Skin Color & Pigmentation] : normal skin color and pigmentation [] : no rash [Deep Tendon Reflexes (DTR)] : deep tendon reflexes were 2+ and symmetric [Sensation] : the sensory exam was normal to light touch and pinprick [No Focal Deficits] : no focal deficits [Impaired Insight] : insight and judgment were intact [Oriented To Time, Place, And Person] : oriented to person, place, and time [Affect] : the affect was normal

## 2019-12-09 NOTE — HISTORY OF PRESENT ILLNESS
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 He is referred by oncologist Dr. Mak Marquez for surgical consultation of EMANUEL mass. \par \par CT chest on 1/10/19: \par - 3.1 cm EMANUEL mass increased in size when compared to previous exam, represents metastasis. \par \par PET/CT on 2/15/19:\par - 4.2 x 3.2cm SUV=6.8 centrally photopenic EMANUEL mass (previously 3.1 x 2.7cm)\par - difficult to delineate intramuscular focus posterior to Rt 12th costovertebral joint w/ SUV=7.5\par \par Now 9 mo s/p Lt VATS, robotic-assisted, wedge rxn of EMANUEL, drainage of Lt pleural effusion on 2/21/19. Path of EMANUEL wedge revealed +Mets high grade malignant neoplasm c/w previous hx of myxofibrosarcoma, +vascular permeation, surgical margin negative. Lt Pleural fluid is negative for malignant cells.\par \par CXR on 05/21/2019 -- clear, no PTX.\par \par s/p excision of right paravertebral mass with preoperative radiologic localization on 05/24/2019 by Dr. Veras. Path of right paravertebral region soft tissue reveals recurrent high grade myxofibrosarcoma. XRT completed on 08/30/2019. \par \par PET/CT on 10/31/2019:\par - minimally FDG-avid EMANUEL postsurgical changes demonstrate interval decrease in thickening along suture line\par - s/p interval resection of hypermetabolic intramuscular lesions seen in right paraspinal region at level of 12th rib (pathology showed recurrent myxofibrosarcoma). There is low-grade \par - new, difficult to delineate, questionable focus of increased FDG activity in region of fourth ventricle (SUV 11.4; image 13)\par - stable small hypermetabolic focus in lower pole of right thyroid lobe is unchanged (SUV 5.1; image 61; previous SUV 4.5)\par - post-op changes in right chest wall\par \par Of note, Brain MRI w/w/o contrast scheduled next week by Dr. Krause.\par Patient is here today for a follow up. Denies SOB, CP, cough.\par

## 2019-12-09 NOTE — CONSULT LETTER
[Dear  ___] : Dear  [unfilled], [Consult Letter:] : I had the pleasure of evaluating your patient, [unfilled]. [( Thank you for referring [unfilled] for consultation for _____ )] : Thank you for referring [unfilled] for consultation for [unfilled] [Please see my note below.] : Please see my note below. [Consult Closing:] : Thank you very much for allowing me to participate in the care of this patient.  If you have any questions, please do not hesitate to contact me. [Sincerely,] : Sincerely, [DrTree  ___] : Dr. HOWELL [DrTree ___] : Dr. HOWELL [FreeTextEntry2] : Mak Marquez MD (Hem/Onc)\par Greg Renteria MD (PCP)\par Dr. Del Rio (Cardiology)  [FreeTextEntry3] : Crispin Heredia MD, MPH \par System Director of Thoracic Surgery \par Director of Comprehensive [facility] [place] \par Professor Cardiovascular & Thoracic Surgery  \par [employer] [facility] [employer] [facility]\par

## 2019-12-09 NOTE — DATA REVIEWED
[FreeTextEntry1] : s/p excision of right paravertebral mass with preoperative radiologic localization on 05/24/2019 by Dr. Veras. Path of right paravertebral region soft tissue reveals recurrent high grade myxofibrosarcoma. XRT completed on 08/30/2019. \par \par PET/CT on 10/31/2019:\par - minimally FDG-avid EMANUEL postsurgical changes demonstrate interval decrease in thickening along suture line\par - s/p interval resection of hypermetabolic intramuscular lesions seen in right paraspinal region at level of 12th rib (pathology showed recurrent myxofibrosarcoma). There is low-grade \par - new, difficult to delineate, questionable focus of increased FDG activity in region of fourth ventricle (SUV 11.4; image 13)\par - stable small hypermetabolic focus in lower pole of right thyroid lobe is unchanged (SUV 5.1; image 61; previous SUV 4.5)\par - post-op changes in right chest wall

## 2019-12-09 NOTE — ASSESSMENT
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 He is referred by oncologist Dr. Mak Maqruez for surgical consultation of EMANUEL mass. \par \par Now 9 mo s/p Lt VATS, robotic-assisted, wedge rxn of EMANUEL, drainage of Lt pleural effusion on 2/21/19. Path of EMANUEL wedge revealed +Mets high grade malignant neoplasm c/w previous hx of myxofibrosarcoma, +vascular permeation, surgical margin negative. Lt Pleural fluid is negative for malignant cells.\par \par s/p excision of right paravertebral mass with preoperative radiologic localization on 05/24/2019 by Dr. Veras. Path of right paravertebral region soft tissue reveals recurrent high grade myxofibrosarcoma. XRT completed on 08/30/2019. \par \par PET/CT on 10/31/2019:\par - minimally FDG-avid EMANUEL postsurgical changes demonstrate interval decrease in thickening along suture line\par - s/p interval resection of hypermetabolic intramuscular lesions seen in right paraspinal region at level of 12th rib (pathology showed recurrent myxofibrosarcoma). There is low-grade \par - new, difficult to delineate, questionable focus of increased FDG activity in region of fourth ventricle (SUV 11.4; image 13)\par - stable small hypermetabolic focus in lower pole of right thyroid lobe is unchanged (SUV 5.1; image 61; previous SUV 4.5)\par - post-op changes in right chest wall\par \par I have reviewed the patient's medical records and diagnostic images at time of this office consultation and have made the following recommendation:\par 1. PET scan reviewed, recommended patient to f/u with Hem/Onc and Rad/Onc\par 2. RTC as needed\par \par \par I personally performed the services described in the documentation, reviewed the documentation recorded by the scribe in my presence and it accurately and completely records my words and actions.\par \par I, Rebel Ferguson, MILLA, am scribing for and the presence of RIO Daily, the following sections HISTORY OF PRESENT ILLNESS, PAST MEDICAL/FAMILY/SOCIAL HISTORY; REVIEW OF SYSTEMS; VITAL SIGNS; PHYSICAL EXAM; DISPOSITION.\par \par

## 2019-12-12 ENCOUNTER — FORM ENCOUNTER (OUTPATIENT)
Age: 84
End: 2019-12-12

## 2019-12-13 ENCOUNTER — APPOINTMENT (OUTPATIENT)
Dept: MRI IMAGING | Facility: IMAGING CENTER | Age: 84
End: 2019-12-13
Payer: MEDICARE

## 2019-12-13 ENCOUNTER — OUTPATIENT (OUTPATIENT)
Dept: OUTPATIENT SERVICES | Facility: HOSPITAL | Age: 84
LOS: 1 days | End: 2019-12-13
Payer: MEDICARE

## 2019-12-13 DIAGNOSIS — N40.0 BENIGN PROSTATIC HYPERPLASIA WITHOUT LOWER URINARY TRACT SYMPTOMS: Chronic | ICD-10-CM

## 2019-12-13 DIAGNOSIS — C49.9 MALIGNANT NEOPLASM OF CONNECTIVE AND SOFT TISSUE, UNSPECIFIED: ICD-10-CM

## 2019-12-13 DIAGNOSIS — T82.855A STENOSIS OF CORONARY ARTERY STENT, INITIAL ENCOUNTER: Chronic | ICD-10-CM

## 2019-12-13 DIAGNOSIS — Z85.831 PERSONAL HISTORY OF MALIGNANT NEOPLASM OF SOFT TISSUE: Chronic | ICD-10-CM

## 2019-12-13 PROCEDURE — A9585: CPT

## 2019-12-13 PROCEDURE — 70553 MRI BRAIN STEM W/O & W/DYE: CPT | Mod: 26

## 2019-12-13 PROCEDURE — 70553 MRI BRAIN STEM W/O & W/DYE: CPT

## 2020-01-14 ENCOUNTER — APPOINTMENT (OUTPATIENT)
Dept: RADIATION ONCOLOGY | Facility: CLINIC | Age: 85
End: 2020-01-14
Payer: MEDICARE

## 2020-01-14 VITALS
OXYGEN SATURATION: 97 % | BODY MASS INDEX: 27.17 KG/M2 | WEIGHT: 184 LBS | RESPIRATION RATE: 16 BRPM | TEMPERATURE: 98.24 F | SYSTOLIC BLOOD PRESSURE: 143 MMHG | HEART RATE: 81 BPM | DIASTOLIC BLOOD PRESSURE: 70 MMHG

## 2020-01-14 DIAGNOSIS — C49.9 MALIGNANT NEOPLASM OF CONNECTIVE AND SOFT TISSUE, UNSPECIFIED: ICD-10-CM

## 2020-01-14 PROCEDURE — 99213 OFFICE O/P EST LOW 20 MIN: CPT

## 2020-01-16 PROBLEM — C49.9 PRIMARY MYXOFIBROSARCOMA: Status: ACTIVE | Noted: 2018-01-30

## 2020-01-22 NOTE — ASU DISCHARGE PLAN (ADULT/PEDIATRIC). - NS AS DC DISCHARGE ACCOMPANY
Incoming refill request for: Alprazolam  Per PDMP last dispensed on: 12/24/19  Last seen by: Keo Conde M.D. on: 4/5/19  Future appointment to see PCP on: nothing scheduled  Active medication agreement updated on: 4/5/19  Last Drug Screen Collected on: 8/13/19    Script pended, with a start date of: 1/23/20    PDMP review:    Criteria met. Forwarded to Physician/DALILA for signature.  Forwarded to Georgia Anaya on call.     Family

## 2020-02-17 NOTE — HISTORY OF PRESENT ILLNESS
[FreeTextEntry1] : Mr. Liu is a 86 year old man with a history of a pT3 G3 myxofibrosarcoma of the right posterior chest wall who underwent resection in December 2017 followed by adjuvant radiation therapy to 66 Gy completed in May 2018.   He then had recurrent disease in the right paravertebral region for which he underwent resection in May 2019 with close 2 mm margins and underwent adjuvant radiation therapy to 66 Gy completed 8/30/19.\par \par At his last visit 10/11/19 he was feeling well.\par \par Brain MRI done on 12/13/19 for questionable focus of FDG activity in the region of fourth ventricles.  In the head images from PET/CT done on 10/31/19, there was no evidence of metastatic disease.\par \par Today he is recovering from an URI for which he's on antibiotics otherwise was feeling well. Notes knee pain. He saw Dr. Heredia 12/5/19 and will no longer follow per daughter. He has an appointment with Dr. Veras 1/22/19.\par

## 2020-02-17 NOTE — REVIEW OF SYSTEMS
[SOB on Exertion] : shortness of breath during exertion [Negative] : Cardiovascular [FreeTextEntry7] : occ incontinent [FreeTextEntry8] : incontinent  [Fatigue: Grade 1 - Fatigue relieved by rest] : Fatigue: Grade 1 - Fatigue relieved by rest [Cough: Grade 1 - Mild symptoms; nonprescription intervention indicated] : Cough: Grade 1 - Mild symptoms; nonprescription intervention indicated [Dyspnea: Grade 0] : Dyspnea: Grade 0 [Pruritus: Grade 0] : Pruritus: Grade 0 [Skin Hyperpigmentation: Grade 1 - Hyperpigmentation covering <10% BSA; no psychosocial impact] : Skin Hyperpigmentation: Grade 1 - Hyperpigmentation covering <10% BSA; no psychosocial impact [Skin Induration: Grade 0] : Skin Induration: Grade 0 [Dermatitis Radiation: Grade 0] : Dermatitis Radiation: Grade 0

## 2020-02-17 NOTE — VITALS
[ECOG Performance Status: 3 - Capable of only limited self care, confined to bed or chair more than 50% of waking hours] : Performance Status: 3 - Capable of only limited self care, confined to bed or chair more than 50% of waking hours [Pain Location: ___] : Pain Location: [unfilled] [Maximal Pain Intensity: 3/10] : 3/10 [70: Cares for self; unalbe to carry on normal activity or do active work.] : 70: Cares for self; unable to carry on normal activity or do active work.

## 2020-02-17 NOTE — PHYSICAL EXAM
[de-identified] : scar well healed, some hyperpigmentation [de-identified] : hyperpigmentation over right paravertebral area [General Appearance - Alert] : alert [] : no respiratory distress [General Appearance - In No Acute Distress] : in no acute distress [Auscultation Breath Sounds / Voice Sounds] : lungs were clear to auscultation bilaterally [Normal] : supple with no thyromegaly or masses appreciated [Abdomen Soft] : soft [Abdomen Tenderness] : non-tender [Motor Tone] : muscle strength and tone were normal [de-identified] : residual hyperpigmentation treated site [de-identified] : oriented to person and place

## 2020-02-27 NOTE — ASU PREOP CHECKLIST - SELECT TESTS ORDERED
Onset: 2/26/20    Location / description: Terell is calling with neuro changes.  He has had a headache for a few days and numbness/weakness to left leg and arm.  His family noticed tonight that his face is drooping slightly and he has slurred speech at times.    Precipitating Factors: Stress, prison for 60 days  Pain Scale (1-10), 10 highest: 0/10  Associated Symptoms: Above  What  improves / worsens symptoms: NA  Symptom specific medications: NA  Recent Care: None Recent    PLAN:  Directed to Emergency Department    Patient/Caller agrees to follow recommendations.    Reason for Disposition  • [1] Numbness (i.e., loss of sensation) of the face, arm or leg on one side of the body AND [2] sudden onset AND [3] transient (i.e., completely resolved)    Protocols used: NEUROLOGIC DEFICIT-A-AH    
Regarding: numbness in left leg, slurring his words, left side of face feels heavy   ----- Message from Juan F Kevin sent at 2/26/2020  9:04 PM CST -----  Patient Name: Terell Prieto  Specialist or PCP Full Name:na   Pregnant (If Yes, how long?): na   Symptoms: numbness in left leg, slurring his words, left side of face feels heavy        If fever or Respiratory symptoms (cough, shortness of breath), has the patient traveled to [place] within the last 14 days or has had close contact with someone who has traveled to [place]? No  Call Back #: [phone]  Which state are you currently located in? [Enter State abbreviation in Summary field along with chief complaint]: [place]  Call Center Account #:[id number]       
Urinalysis/EKG/CBC/CMP

## 2020-05-13 NOTE — PHYSICAL EXAM
[General Appearance - Alert] : alert [General Appearance - In No Acute Distress] : in no acute distress [Normal] : no respiratory distress, lungs were clear to auscultation bilaterally [Heart Rate And Rhythm] : heart rate and rhythm were normal [Heart Sounds] : normal S1 and S2 [Bowel Sounds] : normal bowel sounds [Abdomen Soft] : soft [de-identified] : gait instability [de-identified] : healed surgical incisions, hyperpigmentation right posterior chest wall at prior RT site [de-identified] : oriented to person

## 2020-05-13 NOTE — HISTORY OF PRESENT ILLNESS
[FreeTextEntry1] : Mr. 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  He is referred for consideration of postoperative radiation therapy.\par \par He initially underwent resection of the right posterior chest wall mass on 12/26/17 with pathology demonstrating a 7 x 6.5 x 5 cm deep myxofibrosacoma with a mitotic rate of 60 per 10 high power fields (grade 3) with negative but close margins (0.2 cm from deep margin).\par \par He has been doing well since his surgery with no pain or difficulty healing. He maintains full range of motion in his right upper extremity. He walks with a walker but has been doing that since prior to the surgery. He is now here with his daughter Fidelina to discuss possible adjuvant radiation.

## 2020-05-13 NOTE — REVIEW OF SYSTEMS
[Fatigue: Grade 1 - Fatigue relieved by rest] : Fatigue: Grade 1 - Fatigue relieved by rest [Cough: Grade 0] : Cough: Grade 0 [Dyspnea: Grade 0] : Dyspnea: Grade 0 [Pruritus: Grade 0] : Pruritus: Grade 0 [Joint Pain] : joint pain [Muscle Weakness] : muscle weakness [Disturbance Of Gait] : gait disturbance [Negative] : Constitutional [Dysphagia: Grade 0] : Dysphagia: Grade 0 [Shortness Of Breath] : no shortness of breath [FreeTextEntry9] : ambulates with walker [Skin Rash] : no skin rash [de-identified] : healed surgical incisions

## 2020-09-14 NOTE — H&P PST ADULT - EXTERNAL EAR L
Drysol Counseling:  I discussed with the patient the risks of drysol/aluminum chloride including but not limited to skin rash, itching, irritation, burning. normal

## 2020-12-13 NOTE — PACU DISCHARGE NOTE - HYDRATION STATUS:
Isaura,    All of your labs were normal for you.    Have a nice day!    Dr. Umana       Satisfactory

## 2020-12-26 NOTE — ASU PREOP CHECKLIST - SKIN PREP
I called TM following BOT that was filled out. TM did not answer. I LVM to call back  hotline. TM has test result pending, intake needed. Routed to "[employer].   
done

## 2020-12-30 NOTE — DISCHARGE NOTE ADULT - PATIENT PORTAL LINK FT
Resolved    “You can access the FollowHealth Patient Portal, offered by [facility], by registering with the following website: [email or web address]”

## 2021-03-12 NOTE — H&P PST ADULT - MOUTH
normal mouth and gums Birth Control Pills Counseling: Birth Control Pill Counseling: I discussed with the patient the potential side effects of OCPs including but not limited to increased risk of stroke, heart attack, thrombophlebitis, deep venous thrombosis, hepatic adenomas, breast changes, GI upset, headaches, and depression.  The patient verbalized understanding of the proper use and possible adverse effects of OCPs. All of the patient's questions and concerns were addressed.

## 2021-07-02 NOTE — ASU PREOP CHECKLIST - DNR CLARIFICATION FORM COMPLETED
I left a message for Marlene to remind her to get her bloodwork done today and to also remind her of her postop visit with Paula Jarrett on Tuesday 7/6 at 1:00pm  n/a

## 2021-08-12 NOTE — H&P PST ADULT - BSA (M2)
"-- DO NOT REPLY / DO NOT REPLY ALL --  -- Message is from the [employer]--    General Patient Message      Reason for Call: Patient called in to schedule a appointment with the physician. . please contact the patient back. Alternative phone number: [phone]    Turnaround time given to caller: ""This message will be sent to [facility] Provider's name]. The clinical team will fulfill your request as soon as they review your message. \""    " 1.95

## 2021-11-22 NOTE — ED PROVIDER NOTE - TOBACCO USE
Continuity of Care Form    Patient Name: Robert Khan    :  1952  MRN:  [id number]    Admit date:  11/15/2021  Discharge date:  ***    Code Status Order: Full Code   Advance Directives:      Admitting Physician:  Gogo Vega MD  PCP: Carolyn Snyder DO    Discharging Nurse: [facility] Unit/Room#: 7298/8780-15  Discharging Unit Phone Number: ***    Emergency Contact:   Extended Emergency Contact Information  Primary Emergency Contact: Aaron Guzman   [address] Phone: [phone]  Relation: Child    Past Surgical History:  Past Surgical History:   Procedure Laterality Date    APPENDECTOMY      CHOLECYSTECTOMY, LAPAROSCOPIC      COLONOSCOPY  2021    COLONOSCOPY POLYPECTOMY SNARE/COLD BIOPSY performed by Olman Ovalles MD at [place] [address]  2021    COLONOSCOPY POLYPECTOMY ABLATION performed by Olman Ovalles MD at [place] [address]  2021    COLONOSCOPY CONTROL HEMORRHAGE performed by Olman Oavlles MD at [address]  2013    EYE SURGERY Left     FEMORAL ENDARTERECTOMY Right 2021    RIGHT FEMORAL ENDARTERECTOMY  RIGHT EXTERNAL ILIAC TO PROFUNDA FEMORAL BYPASS WITH 8MM PTFE GRAFT RIGHT LOWER EXTREMITY ARTERIOGRAM BALLOON ANGIOPLASTY RIGHT PROFUNDA BALLOON ANGIOPLASTY AND STENT OF RIGHT EXTERNAL ILIAC ARTERY performed by Gogo Vega MD at [address] [place] [address]  2015    Bilateral decompressive lumbar laminectomy L4-5   ; medial facetectomy L4-5 joints  bilaterally; foraminotomies l5   nerve roots bilaterally    LEG DEBRIDEMENT Right 2013    Dr. Sussy Arizmendi - pretibial wound    OTHER SURGICAL HISTORY Right 2013    Dr. Sussy Arizmendi - CFA Endarterectomy w/marsupialization; RLE wound excision & debridement     OTHER SURGICAL HISTORY Left 2013    Dr. Sussy Arizmendi - femoral & profunda femoris endarterectomy w/marsupialization patch angioplasty using SFA    SKIN SPLIT GRAFT Right 7/25/2013    Dr. Sarai Reeves - to non-healing R pretibial wound, 9 x 15 cm    TOTAL HIP ARTHROPLASTY Right 09/01/2016    Dr. Rosamaria Martínez Left 12/22/2015    Dr. Sarai Reeves - CFA exploration, thrombectomy of ileofemoral system, stenting of RAFAL in-stent stenosis w/8 x 37 mm Palmaz        Immunization History:   Immunization History   Administered Date(s) Administered    COVID-19, Pfizer, PF, 30mcg/0.3mL 03/17/2021, 04/07/2021, 11/01/2021    Influenza 10/14/2013    Influenza Vaccine, unspecified formulation 10/14/2013, 10/22/2015    Influenza Virus Vaccine 10/14/2013, 10/14/2014, 10/22/2015    Influenza, High Dose (Fluzone 65 yrs and older) 10/22/2015, 11/18/2016, 08/17/2017, 08/30/2018, 10/17/2019, 09/14/2020    Influenza, [place] Louder, adjuvanted, 65 yrs +, IM, PF (Fluad) 09/24/2021    Pneumococcal Conjugate 13-valent (Xrqfceo14) 10/22/2015    Pneumococcal Conjugate 7-valent (Prevnar7) 03/01/2013    Pneumococcal Polysaccharide ([id number]) 01/03/2018    Tdap (Boostrix, Adacel) 10/14/2013    Zoster Live (Zostavax) 06/07/2014    Zoster Recombinant (Shingrix) 05/08/2018       Active Problems:  Patient Active Problem List   Diagnosis Code    Essential hypertension I10    Hyperlipemia E78.5    Degeneration of intervertebral disc of lumbar region M51.36    KAVITA and COPD overlap syndrome ([facility]) G47.33, J44.9    PVD (peripheral vascular disease) ([facility]) I73.9    Coronary artery disease involving native coronary artery of native heart without angina pectoris I25.10    Idiopathic peripheral neuropathy G60.9    Pulmonary nodule R91.1    Chronic, continuous use of opioids F11.90    Chronic pain syndrome G89.4    Gastroesophageal reflux disease K21.9    Sacroiliitis, not elsewhere classified ([facility] [address] M46.1    Other spondylosis, lumbosacral region M47.897    Chronic diastolic heart failure ([facility]) I50.32    Cigarette nicotine dependence without complication Q96.351    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 00X0    Pulmonary (Comment) 11/17/21 1742   Closure None 11/17/21 1942   Drainage Amount None 11/17/21 1942   Dressing/Treatment Open to air 11/17/21 1742   Number of days: 4       Wound 11/15/21 Foot Anterior; Right; Outer (Active)   Wound Etiology Arterial 11/22/21 1155   Dressing/Treatment Open to air 11/22/21 1155   Drainage Amount None 11/22/21 1155   Number of days: 6       Wound 11/15/21 Pretibial Distal; Right (Active)   Wound Etiology Arterial 11/22/21 1155   Dressing/Treatment Open to air 11/22/21 1155   Drainage Amount None 11/22/21 1155   Number of days: 6       Wound 11/15/21 Foot Anterior; Right; Inner (Active)   Wound Etiology Arterial 11/22/21 1155   Dressing/Treatment Open to air 11/20/21 1800   Drainage Amount None 11/22/21 1155   Number of days: 6       Wound 11/15/21 Pretibial Distal; Right; Outer (Active)   Wound Etiology Arterial 11/22/21 1155   Dressing/Treatment Open to air 11/22/21 1155   Drainage Amount None 11/22/21 1155   Number of days: 6        Elimination:  Continence: Bowel: {YES / IA:93784}  Bladder: {YES / KJ:00871}  Urinary Catheter: {Urinary Catheter:840800022}   Colostomy/Ileostomy/Ileal Conduit: {YES / NF:91099}       Date of Last BM: ***    Intake/Output Summary (Last 24 hours) at 11/22/2021 1319  Last data filed at 11/22/2021 0948  Gross per 24 hour   Intake 650 ml   Output 950 ml   Net -300 ml     I/O last 3 completed shifts:   In: 5 [P.O.:420]  Out: 1350 [Urine:1350]    Safety Concerns:     [employer] Safety Concerns:429594542}    Impairments/Disabilities:      [employer] Impairments/Disabilities:867557767}    Nutrition Therapy:  Current Nutrition Therapy:   [employer] Diet List:519438466}    Routes of Feeding: {CHP DME Other Feedings:349989990}  Liquids: {Slp liquid thickness:00838}  Daily Fluid Restriction: {CHP DME Yes amt example:459671383}  Last Modified Barium Swallow with Video (Video Swallowing Test): {Done Not Done TT:779011367}    Treatments at the Time of Hospital Discharge: Respiratory Treatments: ***  Oxygen Therapy:  {Therapy; copd oxygen:37457}  Ventilator:    {MH CC Vent ESQE:648570169}    Heart Failure Instructions for Daily Management  Patient was treated for chronic diastolic heart failure. he  will require the following:    Please weigh daily on the same scale and approximately the same time of day. Report weight gain of 3 pounds/day or 5 pounds/week to : [address] and [facility] [phone]. Please use hospital discharge weight as baseline reference. Please monitor for signs and symptoms of and report to MD:  Worsening Heart Failure: sudden weight gain, shortness of breath, lower extremity or general edema/swelling, abdominal bloating/swelling, inability to lie flat, intolerance to usual activity, or cough (especially at night). Report these finding even if no increase in weight. Dehydration:  having difficulty or a decrease in urination, dizziness, worsening fatigue, or new onset/worsening of generalized weakness. Please continue a LOW SODIUM diet and LIMIT fluid intake to 48 - 64 ounces ( 1.5 - 2 liters) per day. Call [address] and [facility] [phone] with any questions or concerns. Please continue heart failure education to patient and family/support system. See After Visit Summary for hospital follow up appointment details. Consider spiritual care referral for support and/or completion of advance directives . Consider: [place] [address] telehealth program if patient agreeable and able to participate and palliative care consult for ongoing goals of care, end of life, and/or chronic disease management discussions.   Dr Rickie Monahan is pt's primary cardiologist.       Rehab Therapies: {THERAPEUTIC INTERVENTION:2881056212}  Weight Bearing Status/Restrictions: [employer] Weight Bearin}  Other Medical Equipment (for information only, NOT a DME order): {EQUIPMENT:118820160}  Other Treatments: ***    Patient's personal belongings (please select all that are sent with patient):  {CHP DME Belongings:447255745}    RN SIGNATURE:  {Esignature:348543157}    CASE MANAGEMENT/SOCIAL WORK SECTION    Inpatient Status Date: ***    Readmission Risk Assessment Score:  Readmission Risk              Risk of Unplanned Readmission:  30           Discharging to Facility/ Agency   Name:   Address:  Phone:  Fax:    Dialysis Facility (if applicable)   Name:  Address:  Dialysis Schedule:  Phone:  Fax:    / signature: {Esignature:464730541}    PHYSICIAN SECTION    Prognosis: {Prognosis:4360387241}    Condition at Discharge: [facility] Patient Condition:413175225}    Rehab Potential (if transferring to Rehab): {Prognosis:6709771095}    Recommended Labs or Other Treatments After Discharge: ***    Physician Certification: I certify the above information and transfer of [employer].  is necessary for the continuing treatment of the diagnosis listed and that he requires {Admit to Appropriate Level of Care:14553} for {GREATER/LESS:975442914} 30 days.      Update Admission H&P: {CHP DME Changes in NKYLB:620175290}    PHYSICIAN SIGNATURE:  {Esignature:074100960} Never smoker

## 2021-12-20 NOTE — PATIENT PROFILE ADULT. - PAIN CHRONIC, PROFILE
Patient seen in RR, resting with complaints of severe pain to the left hip.  No Chest Pain, SOB, N/V.    T(C): 36.2 (12-20-21 @ 19:00), Max: 36.4 (12-20-21 @ 10:25)  HR: 65 (12-20-21 @ 19:30) (65 - 83)  BP: 120/55 (12-20-21 @ 19:30) (105/53 - 136/74)  RR: 16 (12-20-21 @ 19:30) (16 - 18)  SpO2: 99% (12-20-21 @ 19:30) (95% - 100%)  Wt(kg): --    Exam:  Alert and [place], No Acute Distress  Card: +S1/S2, RRR  Pulm: CTAB  Laterality: Left hip aquacels c/d/i  Abduction pillow in place  Calves soft, non-tender bilaterally  +PF/DF/EHL/FHL  SILT  + DP pulse    Xray: Post-op xray in chart             A/P: Patient is a 61y Female S/p Left TAM. VSS. NAD  -PT/OT-WBAT With Posterior Hip Precautions  -IS encouraged  -DVT PPx - Aspirin 81 mg BID  -OOB to chair  -FU AM Labs  -Pain Control - PRN ; patient with history of chronic pain, may need to adjust pain meds as needed  -Continue Current Tx  -Dispo planning    Bekah Villatoro PA-C  Team Pager #[phone]
yes

## 2022-01-13 ENCOUNTER — INPATIENT (INPATIENT)
Facility: HOSPITAL | Age: 87
LOS: 3 days | Discharge: OTHER INSTITUTION | End: 2022-01-17
Attending: INTERNAL MEDICINE | Admitting: INTERNAL MEDICINE
Payer: MEDICARE

## 2022-01-13 VITALS
HEIGHT: 69 IN | TEMPERATURE: 97 F | RESPIRATION RATE: 20 BRPM | DIASTOLIC BLOOD PRESSURE: 110 MMHG | SYSTOLIC BLOOD PRESSURE: 129 MMHG

## 2022-01-13 DIAGNOSIS — E11.9 TYPE 2 DIABETES MELLITUS WITHOUT COMPLICATIONS: ICD-10-CM

## 2022-01-13 DIAGNOSIS — N40.0 BENIGN PROSTATIC HYPERPLASIA WITHOUT LOWER URINARY TRACT SYMPTOMS: Chronic | ICD-10-CM

## 2022-01-13 DIAGNOSIS — N17.9 ACUTE KIDNEY FAILURE, UNSPECIFIED: ICD-10-CM

## 2022-01-13 DIAGNOSIS — I95.9 HYPOTENSION, UNSPECIFIED: ICD-10-CM

## 2022-01-13 DIAGNOSIS — N39.0 URINARY TRACT INFECTION, SITE NOT SPECIFIED: ICD-10-CM

## 2022-01-13 DIAGNOSIS — A41.9 SEPSIS, UNSPECIFIED ORGANISM: ICD-10-CM

## 2022-01-13 DIAGNOSIS — Z85.831 PERSONAL HISTORY OF MALIGNANT NEOPLASM OF SOFT TISSUE: Chronic | ICD-10-CM

## 2022-01-13 DIAGNOSIS — T82.855A STENOSIS OF CORONARY ARTERY STENT, INITIAL ENCOUNTER: Chronic | ICD-10-CM

## 2022-01-13 DIAGNOSIS — R62.7 ADULT FAILURE TO THRIVE: ICD-10-CM

## 2022-01-13 DIAGNOSIS — Z29.9 ENCOUNTER FOR PROPHYLACTIC MEASURES, UNSPECIFIED: ICD-10-CM

## 2022-01-13 LAB
ACANTHOCYTES BLD QL SMEAR: SLIGHT — SIGNIFICANT CHANGE UP
ALBUMIN SERPL ELPH-MCNC: 2.3 G/DL — LOW (ref 3.3–5)
ALP SERPL-CCNC: 93 U/L — SIGNIFICANT CHANGE UP (ref 40–120)
ALT FLD-CCNC: 59 U/L — HIGH (ref 4–41)
ANION GAP SERPL CALC-SCNC: 15 MMOL/L — HIGH (ref 7–14)
ANION GAP SERPL CALC-SCNC: 22 MMOL/L — HIGH (ref 7–14)
ANISOCYTOSIS BLD QL: SIGNIFICANT CHANGE UP
APPEARANCE UR: ABNORMAL
AST SERPL-CCNC: 136 U/L — HIGH (ref 4–40)
BACTERIA # UR AUTO: ABNORMAL
BASE EXCESS BLDV CALC-SCNC: -5.8 MMOL/L — LOW (ref -2–3)
BASE EXCESS BLDV CALC-SCNC: -6.7 MMOL/L — LOW (ref -2–3)
BASOPHILS # BLD AUTO: 0 K/UL — SIGNIFICANT CHANGE UP (ref 0–0.2)
BASOPHILS NFR BLD AUTO: 0 % — SIGNIFICANT CHANGE UP (ref 0–2)
BILIRUB SERPL-MCNC: 0.5 MG/DL — SIGNIFICANT CHANGE UP (ref 0.2–1.2)
BILIRUB UR-MCNC: NEGATIVE — SIGNIFICANT CHANGE UP
BLOOD GAS ARTERIAL COMPREHENSIVE RESULT: SIGNIFICANT CHANGE UP
BLOOD GAS VENOUS COMPREHENSIVE RESULT: SIGNIFICANT CHANGE UP
BLOOD GAS VENOUS COMPREHENSIVE RESULT: SIGNIFICANT CHANGE UP
BUN SERPL-MCNC: 118 MG/DL — HIGH (ref 7–23)
BUN SERPL-MCNC: 120 MG/DL — HIGH (ref 7–23)
BURR CELLS BLD QL SMEAR: PRESENT — SIGNIFICANT CHANGE UP
CALCIUM SERPL-MCNC: 8.2 MG/DL — LOW (ref 8.4–10.5)
CALCIUM SERPL-MCNC: 8.7 MG/DL — SIGNIFICANT CHANGE UP (ref 8.4–10.5)
CHLORIDE BLDV-SCNC: 113 MMOL/L — HIGH (ref 96–108)
CHLORIDE BLDV-SCNC: 117 MMOL/L — HIGH (ref 96–108)
CHLORIDE SERPL-SCNC: 109 MMOL/L — HIGH (ref 98–107)
CHLORIDE SERPL-SCNC: 116 MMOL/L — HIGH (ref 98–107)
CO2 BLDV-SCNC: 19.3 MMOL/L — LOW (ref 22–26)
CO2 BLDV-SCNC: 21.3 MMOL/L — LOW (ref 22–26)
CO2 SERPL-SCNC: 14 MMOL/L — LOW (ref 22–31)
CO2 SERPL-SCNC: 14 MMOL/L — LOW (ref 22–31)
COLOR SPEC: YELLOW — SIGNIFICANT CHANGE UP
CREAT SERPL-MCNC: 3.36 MG/DL — HIGH (ref 0.5–1.3)
CREAT SERPL-MCNC: 3.8 MG/DL — HIGH (ref 0.5–1.3)
DIFF PNL FLD: ABNORMAL
ELLIPTOCYTES BLD QL SMEAR: SLIGHT — SIGNIFICANT CHANGE UP
EOSINOPHIL # BLD AUTO: 0 K/UL — SIGNIFICANT CHANGE UP (ref 0–0.5)
EOSINOPHIL NFR BLD AUTO: 0 % — SIGNIFICANT CHANGE UP (ref 0–6)
EPI CELLS # UR: 2 /HPF — SIGNIFICANT CHANGE UP (ref 0–5)
GAS PNL BLDV: 143 MMOL/L — SIGNIFICANT CHANGE UP (ref 136–145)
GAS PNL BLDV: 144 MMOL/L — SIGNIFICANT CHANGE UP (ref 136–145)
GLUCOSE BLDV-MCNC: 147 MG/DL — HIGH (ref 70–99)
GLUCOSE BLDV-MCNC: 160 MG/DL — HIGH (ref 70–99)
GLUCOSE SERPL-MCNC: 119 MG/DL — HIGH (ref 70–99)
GLUCOSE SERPL-MCNC: 135 MG/DL — HIGH (ref 70–99)
GLUCOSE UR QL: NEGATIVE — SIGNIFICANT CHANGE UP
HCO3 BLDV-SCNC: 18 MMOL/L — LOW (ref 22–29)
HCO3 BLDV-SCNC: 20 MMOL/L — LOW (ref 22–29)
HCT VFR BLD CALC: 35.5 % — LOW (ref 39–50)
HCT VFR BLDA CALC: 30 % — LOW (ref 39–51)
HCT VFR BLDA CALC: 32 % — LOW (ref 39–51)
HGB BLD CALC-MCNC: 10.6 G/DL — LOW (ref 13–17)
HGB BLD CALC-MCNC: 9.9 G/DL — LOW (ref 13–17)
HGB BLD-MCNC: 11.1 G/DL — LOW (ref 13–17)
HYALINE CASTS # UR AUTO: 1 /LPF — SIGNIFICANT CHANGE UP (ref 0–7)
IANC: 7.85 K/UL — SIGNIFICANT CHANGE UP (ref 1.5–8.5)
INR BLD: 1.06 RATIO — SIGNIFICANT CHANGE UP (ref 0.88–1.16)
KETONES UR-MCNC: NEGATIVE — SIGNIFICANT CHANGE UP
LACTATE BLDV-MCNC: 3.4 MMOL/L — HIGH (ref 0.5–2)
LACTATE BLDV-MCNC: 5.1 MMOL/L — CRITICAL HIGH (ref 0.5–2)
LEUKOCYTE ESTERASE UR-ACNC: ABNORMAL
LYMPHOCYTES # BLD AUTO: 0.3 K/UL — LOW (ref 1–3.3)
LYMPHOCYTES # BLD AUTO: 3.5 % — LOW (ref 13–44)
MACROCYTES BLD QL: SIGNIFICANT CHANGE UP
MAGNESIUM SERPL-MCNC: 3 MG/DL — HIGH (ref 1.6–2.6)
MCHC RBC-ENTMCNC: 28.8 PG — SIGNIFICANT CHANGE UP (ref 27–34)
MCHC RBC-ENTMCNC: 31.3 GM/DL — LOW (ref 32–36)
MCV RBC AUTO: 92 FL — SIGNIFICANT CHANGE UP (ref 80–100)
MONOCYTES # BLD AUTO: 0.38 K/UL — SIGNIFICANT CHANGE UP (ref 0–0.9)
MONOCYTES NFR BLD AUTO: 4.4 % — SIGNIFICANT CHANGE UP (ref 2–14)
NEUTROPHILS # BLD AUTO: 7.94 K/UL — HIGH (ref 1.8–7.4)
NEUTROPHILS NFR BLD AUTO: 90.3 % — HIGH (ref 43–77)
NEUTS BAND # BLD: 0.9 % — SIGNIFICANT CHANGE UP (ref 0–6)
NITRITE UR-MCNC: NEGATIVE — SIGNIFICANT CHANGE UP
OVALOCYTES BLD QL SMEAR: SIGNIFICANT CHANGE UP
PCO2 BLDV: 34 MMHG — LOW (ref 42–55)
PCO2 BLDV: 40 MMHG — LOW (ref 42–55)
PH BLDV: 7.31 — LOW (ref 7.32–7.43)
PH BLDV: 7.34 — SIGNIFICANT CHANGE UP (ref 7.32–7.43)
PH UR: 5.5 — SIGNIFICANT CHANGE UP (ref 5–8)
PHOSPHATE SERPL-MCNC: 5.6 MG/DL — HIGH (ref 2.5–4.5)
PLAT MORPH BLD: NORMAL — SIGNIFICANT CHANGE UP
PLATELET # BLD AUTO: 184 K/UL — SIGNIFICANT CHANGE UP (ref 150–400)
PLATELET COUNT - ESTIMATE: NORMAL — SIGNIFICANT CHANGE UP
PO2 BLDV: 30 MMHG — SIGNIFICANT CHANGE UP
PO2 BLDV: 36 MMHG — SIGNIFICANT CHANGE UP
POIKILOCYTOSIS BLD QL AUTO: SIGNIFICANT CHANGE UP
POLYCHROMASIA BLD QL SMEAR: SLIGHT — SIGNIFICANT CHANGE UP
POTASSIUM BLDV-SCNC: 6.4 MMOL/L — CRITICAL HIGH (ref 3.5–5.1)
POTASSIUM BLDV-SCNC: SIGNIFICANT CHANGE UP MMOL/L (ref 3.5–5.1)
POTASSIUM SERPL-MCNC: 5.2 MMOL/L — SIGNIFICANT CHANGE UP (ref 3.5–5.3)
POTASSIUM SERPL-MCNC: 7.1 MMOL/L — CRITICAL HIGH (ref 3.5–5.3)
POTASSIUM SERPL-SCNC: 5.2 MMOL/L — SIGNIFICANT CHANGE UP (ref 3.5–5.3)
POTASSIUM SERPL-SCNC: 7.1 MMOL/L — CRITICAL HIGH (ref 3.5–5.3)
PROT SERPL-MCNC: 6.3 G/DL — SIGNIFICANT CHANGE UP (ref 6–8.3)
PROT UR-MCNC: ABNORMAL
PROTHROM AB SERPL-ACNC: 12.2 SEC — SIGNIFICANT CHANGE UP (ref 10.6–13.6)
RBC # BLD: 3.86 M/UL — LOW (ref 4.2–5.8)
RBC # FLD: 15.1 % — HIGH (ref 10.3–14.5)
RBC BLD AUTO: ABNORMAL
RBC CASTS # UR COMP ASSIST: 3 /HPF — SIGNIFICANT CHANGE UP (ref 0–4)
SAO2 % BLDV: 30.2 % — SIGNIFICANT CHANGE UP
SAO2 % BLDV: 46.7 % — SIGNIFICANT CHANGE UP
SARS-COV-2 RNA SPEC QL NAA+PROBE: SIGNIFICANT CHANGE UP
SODIUM SERPL-SCNC: 145 MMOL/L — SIGNIFICANT CHANGE UP (ref 135–145)
SODIUM SERPL-SCNC: 145 MMOL/L — SIGNIFICANT CHANGE UP (ref 135–145)
SP GR SPEC: 1.02 — SIGNIFICANT CHANGE UP (ref 1–1.05)
TROPONIN T, HIGH SENSITIVITY RESULT: 643 NG/L — CRITICAL HIGH
TROPONIN T, HIGH SENSITIVITY RESULT: 704 NG/L — CRITICAL HIGH
UROBILINOGEN FLD QL: ABNORMAL
VARIANT LYMPHS # BLD: 0.9 % — SIGNIFICANT CHANGE UP (ref 0–6)
WBC # BLD: 8.71 K/UL — SIGNIFICANT CHANGE UP (ref 3.8–10.5)
WBC # FLD AUTO: 8.71 K/UL — SIGNIFICANT CHANGE UP (ref 3.8–10.5)
WBC UR QL: 11 /HPF — HIGH (ref 0–5)

## 2022-01-13 PROCEDURE — 71045 X-RAY EXAM CHEST 1 VIEW: CPT | Mod: 26

## 2022-01-13 PROCEDURE — 99223 1ST HOSP IP/OBS HIGH 75: CPT

## 2022-01-13 PROCEDURE — 99285 EMERGENCY DEPT VISIT HI MDM: CPT

## 2022-01-13 RX ORDER — HEPARIN SODIUM 5000 [USP'U]/ML
5000 INJECTION INTRAVENOUS; SUBCUTANEOUS EVERY 12 HOURS
Refills: 0 | Status: DISCONTINUED | OUTPATIENT
Start: 2022-01-13 | End: 2022-01-17

## 2022-01-13 RX ORDER — CEFTRIAXONE 500 MG/1
1000 INJECTION, POWDER, FOR SOLUTION INTRAMUSCULAR; INTRAVENOUS ONCE
Refills: 0 | Status: COMPLETED | OUTPATIENT
Start: 2022-01-13 | End: 2022-01-13

## 2022-01-13 RX ORDER — CEFTRIAXONE 500 MG/1
1000 INJECTION, POWDER, FOR SOLUTION INTRAMUSCULAR; INTRAVENOUS EVERY 24 HOURS
Refills: 0 | Status: DISCONTINUED | OUTPATIENT
Start: 2022-01-14 | End: 2022-01-14

## 2022-01-13 RX ORDER — MORPHINE SULFATE 50 MG/1
0.5 CAPSULE, EXTENDED RELEASE ORAL EVERY 6 HOURS
Refills: 0 | Status: DISCONTINUED | OUTPATIENT
Start: 2022-01-13 | End: 2022-01-13

## 2022-01-13 RX ORDER — DEXTROSE 50 % IN WATER 50 %
50 SYRINGE (ML) INTRAVENOUS ONCE
Refills: 0 | Status: COMPLETED | OUTPATIENT
Start: 2022-01-13 | End: 2022-01-13

## 2022-01-13 RX ORDER — SODIUM CHLORIDE 9 MG/ML
1000 INJECTION, SOLUTION INTRAVENOUS
Refills: 0 | Status: DISCONTINUED | OUTPATIENT
Start: 2022-01-13 | End: 2022-01-14

## 2022-01-13 RX ORDER — SODIUM CHLORIDE 9 MG/ML
1000 INJECTION INTRAMUSCULAR; INTRAVENOUS; SUBCUTANEOUS ONCE
Refills: 0 | Status: COMPLETED | OUTPATIENT
Start: 2022-01-13 | End: 2022-01-13

## 2022-01-13 RX ORDER — SODIUM CHLORIDE 9 MG/ML
1000 INJECTION, SOLUTION INTRAVENOUS ONCE
Refills: 0 | Status: DISCONTINUED | OUTPATIENT
Start: 2022-01-13 | End: 2022-01-13

## 2022-01-13 RX ORDER — SODIUM CHLORIDE 9 MG/ML
1000 INJECTION, SOLUTION INTRAVENOUS ONCE
Refills: 0 | Status: COMPLETED | OUTPATIENT
Start: 2022-01-13 | End: 2022-01-13

## 2022-01-13 RX ADMIN — SODIUM CHLORIDE 1000 MILLILITER(S): 9 INJECTION, SOLUTION INTRAVENOUS at 07:57

## 2022-01-13 RX ADMIN — SODIUM CHLORIDE 1000 MILLILITER(S): 9 INJECTION INTRAMUSCULAR; INTRAVENOUS; SUBCUTANEOUS at 10:21

## 2022-01-13 RX ADMIN — CEFTRIAXONE 1000 MILLIGRAM(S): 500 INJECTION, POWDER, FOR SOLUTION INTRAMUSCULAR; INTRAVENOUS at 11:03

## 2022-01-13 RX ADMIN — SODIUM CHLORIDE 1000 MILLILITER(S): 9 INJECTION INTRAMUSCULAR; INTRAVENOUS; SUBCUTANEOUS at 17:56

## 2022-01-13 RX ADMIN — CEFTRIAXONE 100 MILLIGRAM(S): 500 INJECTION, POWDER, FOR SOLUTION INTRAMUSCULAR; INTRAVENOUS at 10:22

## 2022-01-13 RX ADMIN — Medication 50 MILLILITER(S): at 07:50

## 2022-01-13 RX ADMIN — SODIUM CHLORIDE 1000 MILLILITER(S): 9 INJECTION INTRAMUSCULAR; INTRAVENOUS; SUBCUTANEOUS at 13:35

## 2022-01-13 NOTE — ED PROVIDER NOTE - ATTENDING CONTRIBUTION TO CARE
Dr. Pugh: 89 yo male with DM, CAD, BPH, lung cancer (not actively on treatment), osteoarthritis, bedbound at baseline (as of approx 6 months ago), brought by daughter due to no PO intake for estimated 3 weeks.  Daughter at bedside is an RN and for the last 3 days has given pt 1000 cc bolus each day (3000 cc total over 3 days) without any change in pt status.  Pt is unable to provide any complaints, is A+Ox0 at baseline.  On exam pt unwell appearing, in mild distress due to known join pains associated with osteoarthritis, heart RRR, lungs CTAB, abd NTND, extremities without swelling, strength grossly decreased and equal in all extremities, severe extremity contractures, decubiti as documented.  Daughter understands pt's debility and decline and would like to make pt DNR/DNI.

## 2022-01-13 NOTE — ED ADULT NURSE REASSESSMENT NOTE - NS ED NURSE REASSESS COMMENT FT1
When turning the patient to the opposite side multiple pressure injuries have been noted to this pt. Stage 2 pressure injuries noted to the following locations: Right elbow, .5 in x .5 in, right knee 1 in x 1 in, right shoulder 3 in x 2 in. Stage 3 noted to right knee 1 in x 1 in, right hip 1.5 in x .25 in. Right hip, non stageable 4 in x 4 in. Pt at baseline mental status. Respirations are even and unlabored. Awaiting BMP results When turning the patient to the opposite side multiple pressure injuries have been noted to this pt. Stage 2 pressure injuries noted to the following locations: Right elbow, .5 in x .5 in, right knee 1 in x 1 in, right shoulder 3 in x 2 in. Stage 3 noted to right knee 1 in x 1 in, right hip 1.5 in x .25 in. Right hip, non stageable 4 in x 4 in. Clean and dry dressings were applied to all noted pressure injuries. Pt at baseline mental status. Respirations are even and unlabored. Awaiting BMP results

## 2022-01-13 NOTE — ED ADULT NURSE REASSESSMENT NOTE - NS ED NURSE REASSESS COMMENT FT1
Received report from night RN. Pt is A&Ox0, non verbal, and lethargic. As per daughter pt is at baseline mental status, brought to the hospital for decreased PO intake. Pt is contracted BUE and BLE, skin is cool to touch with 96.6 rectal temp, MD made aware and pt given warm blankets.  5 in x 5 in necrotic pressure injury noted to left hip, dry dressing placed in ED by night RN, and scabbed scratches sporadically on both arms with no active bleeding noted. Pt is also hypotensive, MD made aware, fluids running as ordered. IV flushing with no resistance with positive blood return. Repeat . Blood cultures drawn and sent. Pt DNR, MOLST in chart. As per MD Riggs, pt to be treated with comfort care measures only at this time.

## 2022-01-13 NOTE — H&P ADULT - PROBLEM SELECTOR PLAN 2
likely from shock vs prerenal from poor PO intake. Slightly improving after boluses. Potassium elevated however hemolyzed, will repeat bmp, trend i/o

## 2022-01-13 NOTE — ED ADULT NURSE NOTE - CHIEF COMPLAINT QUOTE
Presents to ED via EMS, c/o lost of appetite for 3 wks. PMH dementia, A&OxO at baseline, bedbound with pressure injury on left hip, dressing in place. PMH CAD, BPH, lung ca, HTN, DM2 (not on medications)    Unable to get accurate reading of HR and spo2 at triage. FS 61, charge RN made aware.

## 2022-01-13 NOTE — H&P ADULT - PROBLEM SELECTOR PLAN 5
as above, pt likely approaching end of life. Hypotensive in ER with ARF, appears contracted, currently nonverbal and has been declining for months, obtain palliative care consult in AM

## 2022-01-13 NOTE — ED PROVIDER NOTE - OBJECTIVE STATEMENT
89 M with PMH DM2, CAD, BPH, lung cancer, bedbound with pressure ulcers presenting with decreased PO intake x3 weeks. Patient is AOx0 at baseline. Daughter at bedside. 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 Has been afraid to bring him to hospital because of COVID. Only able to take small sips and bites of banana. Has taken nothing by PO last few days. Daughter placed an IV for him at home and has been giving him amarjit saline and D5 normal saline. Notes he has had a precipitous decline since August. Previously ambulatory, now bed bound. Daughter states she would like comfort care only for him and inpatient hospice evaluation. Denies vomiting, diarrhea, fevers, cough.

## 2022-01-13 NOTE — ED ADULT TRIAGE NOTE - CHIEF COMPLAINT QUOTE
Presents to ED via EMS, c/o lost of appetite for 3 wks. PMH dementia, A&OxO at baseline, bedbound with pressure injury on left hip, dressing in place. CAD, BPH, lung ca, HTN, DM2 (not on medications)    Unable to get accurate reading of HR and spo2 at triage. FS 61, charge RN made aware. Presents to ED via EMS, c/o lost of appetite for 3 wks. PMH dementia, A&OxO at baseline, bedbound with pressure injury on left hip, dressing in place. PMH CAD, BPH, lung ca, HTN, DM2 (not on medications)    Unable to get accurate reading of HR and spo2 at triage. FS 61, charge RN made aware.

## 2022-01-13 NOTE — ED ADULT NURSE REASSESSMENT NOTE - NS ED NURSE REASSESS COMMENT FT1
Pt resting in bed at baseline mental status. Pt hypotensive, MD made aware, fluids running at bedside. 16Fr amaro catheter inserted as ordered, tolerated well, minimal yellow urine output noted >10 cc. Respirations are even and unlabored, NSR on cardiac monitor. MD at bedside for ABG.

## 2022-01-13 NOTE — ED PROVIDER NOTE - CLINICAL SUMMARY MEDICAL DECISION MAKING FREE TEXT BOX
89 M with PMH DM2, CAD, BPH, lung cancer, bedbound with pressure ulcers presenting with decreased PO intake x3 weeks. Patient is AOx0 at baseline. Daughter at bedside, requesting comfort care measures only. DNR/DNI. Will fill out MOLST form. Patient TBA for hospice evaluation and failure to thrive. Basic labs, EKG, UA/UC, CXR.

## 2022-01-13 NOTE — ED PROVIDER NOTE - PROGRESS NOTE DETAILS
Wandy Riggs PGY-2: MOLST form complete, placed in chart. Dr. Pugh: K hemolyzed 7 but EKG without any signs of hyperK; will repeat K

## 2022-01-13 NOTE — ED ADULT NURSE REASSESSMENT NOTE - NS ED NURSE REASSESS COMMENT FT1
Pt resting comfortably in stretcher. Stage 3 pressure injury 1.5 in x .5 in noted to L shoulder. Skin tear noted to L hip. Repeat lactate drawn and sent. Respirations are even and unlabored. Awaiting admission

## 2022-01-13 NOTE — H&P ADULT - NSHPLABSRESULTS_GEN_ALL_CORE
144  |  112<H>  |  122<H>  ----------------------------<  136<H>  6.3<HH>   |  18<L>  |  3.39<H>    Ca    8.1<L>      2022 13:45    TPro  6.3  /  Alb  2.3<L>  /  TBili  0.5  /  DBili  x   /  AST  136<H>  /  ALT  59<H>  /  AlkPhos  93                              11.1   8.71  )-----------( 184      ( 2022 08:56 )             35.5             LIVER FUNCTIONS - ( 2022 08:56 )  Alb: 2.3 g/dL / Pro: 6.3 g/dL / ALK PHOS: 93 U/L / ALT: 59 U/L / AST: 136 U/L / GGT: x                 Urinalysis Basic - ( 2022 09:00 )    Color: Yellow / Appearance: Slightly Turbid / S.021 / pH: x  Gluc: x / Ketone: Negative  / Bili: Negative / Urobili: 3 mg/dL   Blood: x / Protein: Trace / Nitrite: Negative   Leuk Esterase: Large / RBC: 3 /HPF / WBC 11 /HPF   Sq Epi: x / Non Sq Epi: 2 /HPF / Bacteria: Many      EKG: normal sinus rhythm with nonspecific tw changes    CXR: FINDINGS:  The cardiomediastinal silhouette is within normal limits.  Patient is S/P prior wedge resection of upper left lung with associated postsurgical changes.  No focal infiltrates. No pleural effusion or pneumothorax  Degenerative changes of the thoracic spine.    IMPRESSION:  Negative for acute cardiopulmonary process.

## 2022-01-13 NOTE — ED ADULT NURSE REASSESSMENT NOTE - NS ED NURSE REASSESS COMMENT FT1
Pt at baseline mental status. Medicine MD at bedside. Aware of Pt VS. Resp even and unlabored. Report given to ESSU 1 RN. Pt transported to area by PCA at this time.

## 2022-01-13 NOTE — H&P ADULT - HISTORY OF PRESENT ILLNESS
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 Per pts daughter he has been declining for months now and until 3 days ago was only taking small sips of water and eating little bits of banana. 3 days ago he stopped eating at all and has not been taking his meds. He at baseline is unable to communicate needs but responds to pain when touched (due to arthritis). pt's daughter fidelina is an RN and has been bolusing pt 1L per day for the last 3 days. She has not noticed foul smelling urine nor any diarrhea. In the ER pt was found hypotensive with positive UA, given ceftriaxone and 3L of fluid. DNR/DNI MOLST filled out in ER with Fidelina and ER staff with decision to pursue comfort measures only

## 2022-01-13 NOTE — ED ADULT NURSE NOTE - OBJECTIVE STATEMENT
family reports increasing AMS, lethargy and pt unable to eat at this time. Patient has a wide array of wounds on back buttox and legs, pt very thin frame and cold to touch, Pt yells out with any interaction of touch. baseline Dementia and hx of lung CA. multiple medical complaints,

## 2022-01-13 NOTE — H&P ADULT - ASSESSMENT
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 In the ER pt was found hypotensive with positive UA.

## 2022-01-13 NOTE — ED PROVIDER NOTE - PHYSICAL EXAMINATION
GENERAL: Awake, non verbal  HEENT: NC/AT, moist mucous membranes, PERRL, EOMI  LUNGS: CTAB, no wheezes or crackles   CARDIAC: RRR, no m/r/g  ABDOMEN: Soft, normal BS, non tender, non distended, no rebound, no guarding  BACK: No midline spinal tenderness, no CVA tenderness  EXT: No edema, no calf tenderness, 2+ DP pulses bilaterally, no deformities.  NEURO: A&O x0.  Moving all extremities.  SKIN: Warm and dry. No rash. Pressure ulcer medial aspect of R knee. Large necrotic L sided sacral decubitus ulcer.

## 2022-01-13 NOTE — H&P ADULT - NSHPPHYSICALEXAM_GEN_ALL_CORE
PHYSICAL EXAM:  GENERAL: cachectic, contracted in bed  HEAD:  Atraumatic, Normocephalic  EYES: EOMI, PERRLA, conjunctiva and sclera clear  NECK: Supple, No JVD  CHEST/LUNG: Clear to auscultation bilaterally; No wheezes, rales or rhonchi  HEART: Regular rate and rhythm; No murmurs, rubs, or gallops, (+)S1, S2  ABDOMEN: Soft, Nontender, Nondistended; Normal Bowel sounds   EXTREMITIES:  contracted, no edema seen  PSYCH: unable to assess  NEUROLOGY: AAOx0, awakes briefly to voice but otherwise does not respond to questions nor follow commands  SKIN: mult pressure ulcers w/ bandage on elbow, knee, hips

## 2022-01-14 DIAGNOSIS — F03.90 UNSPECIFIED DEMENTIA, UNSPECIFIED SEVERITY, WITHOUT BEHAVIORAL DISTURBANCE, PSYCHOTIC DISTURBANCE, MOOD DISTURBANCE, AND ANXIETY: ICD-10-CM

## 2022-01-14 DIAGNOSIS — Z51.5 ENCOUNTER FOR PALLIATIVE CARE: ICD-10-CM

## 2022-01-14 DIAGNOSIS — R78.81 BACTEREMIA: ICD-10-CM

## 2022-01-14 DIAGNOSIS — Z71.89 OTHER SPECIFIED COUNSELING: ICD-10-CM

## 2022-01-14 DIAGNOSIS — R52 PAIN, UNSPECIFIED: ICD-10-CM

## 2022-01-14 DIAGNOSIS — R53.2 FUNCTIONAL QUADRIPLEGIA: ICD-10-CM

## 2022-01-14 LAB
ALBUMIN SERPL ELPH-MCNC: 1.9 G/DL — LOW (ref 3.3–5)
ALP SERPL-CCNC: 83 U/L — SIGNIFICANT CHANGE UP (ref 40–120)
ALT FLD-CCNC: 86 U/L — HIGH (ref 4–41)
ANION GAP SERPL CALC-SCNC: 15 MMOL/L — HIGH (ref 7–14)
AST SERPL-CCNC: 157 U/L — HIGH (ref 4–40)
BASOPHILS # BLD AUTO: 0.01 K/UL — SIGNIFICANT CHANGE UP (ref 0–0.2)
BASOPHILS NFR BLD AUTO: 0.1 % — SIGNIFICANT CHANGE UP (ref 0–2)
BILIRUB DIRECT SERPL-MCNC: 0.2 MG/DL — SIGNIFICANT CHANGE UP (ref 0–0.3)
BILIRUB INDIRECT FLD-MCNC: 0.2 MG/DL — SIGNIFICANT CHANGE UP (ref 0–1)
BILIRUB SERPL-MCNC: 0.4 MG/DL — SIGNIFICANT CHANGE UP (ref 0.2–1.2)
BUN SERPL-MCNC: 125 MG/DL — HIGH (ref 7–23)
CALCIUM SERPL-MCNC: 8.2 MG/DL — LOW (ref 8.4–10.5)
CHLORIDE SERPL-SCNC: 117 MMOL/L — HIGH (ref 98–107)
CHOLEST SERPL-MCNC: 98 MG/DL — SIGNIFICANT CHANGE UP
CO2 SERPL-SCNC: 16 MMOL/L — LOW (ref 22–31)
CREAT SERPL-MCNC: 3.16 MG/DL — HIGH (ref 0.5–1.3)
EOSINOPHIL # BLD AUTO: 0 K/UL — SIGNIFICANT CHANGE UP (ref 0–0.5)
EOSINOPHIL NFR BLD AUTO: 0 % — SIGNIFICANT CHANGE UP (ref 0–6)
GLUCOSE SERPL-MCNC: 100 MG/DL — HIGH (ref 70–99)
GRAM STN FLD: SIGNIFICANT CHANGE UP
GRAM STN FLD: SIGNIFICANT CHANGE UP
HCT VFR BLD CALC: 30.4 % — LOW (ref 39–50)
HDLC SERPL-MCNC: 26 MG/DL — LOW
HGB BLD-MCNC: 9.4 G/DL — LOW (ref 13–17)
IANC: 6.1 K/UL — SIGNIFICANT CHANGE UP (ref 1.5–8.5)
IMM GRANULOCYTES NFR BLD AUTO: 0.4 % — SIGNIFICANT CHANGE UP (ref 0–1.5)
LIPID PNL WITH DIRECT LDL SERPL: 47 MG/DL — SIGNIFICANT CHANGE UP
LYMPHOCYTES # BLD AUTO: 0.36 K/UL — LOW (ref 1–3.3)
LYMPHOCYTES # BLD AUTO: 5.3 % — LOW (ref 13–44)
MAGNESIUM SERPL-MCNC: 3.2 MG/DL — HIGH (ref 1.6–2.6)
MCHC RBC-ENTMCNC: 29.1 PG — SIGNIFICANT CHANGE UP (ref 27–34)
MCHC RBC-ENTMCNC: 30.9 GM/DL — LOW (ref 32–36)
MCV RBC AUTO: 94.1 FL — SIGNIFICANT CHANGE UP (ref 80–100)
METHOD TYPE: SIGNIFICANT CHANGE UP
MONOCYTES # BLD AUTO: 0.23 K/UL — SIGNIFICANT CHANGE UP (ref 0–0.9)
MONOCYTES NFR BLD AUTO: 3.4 % — SIGNIFICANT CHANGE UP (ref 2–14)
NEUTROPHILS # BLD AUTO: 6.1 K/UL — SIGNIFICANT CHANGE UP (ref 1.8–7.4)
NEUTROPHILS NFR BLD AUTO: 90.8 % — HIGH (ref 43–77)
NON HDL CHOLESTEROL: 72 MG/DL — SIGNIFICANT CHANGE UP
NRBC # BLD: 0 /100 WBCS — SIGNIFICANT CHANGE UP
NRBC # FLD: 0 K/UL — SIGNIFICANT CHANGE UP
PLATELET # BLD AUTO: 141 K/UL — LOW (ref 150–400)
POTASSIUM SERPL-MCNC: 5.3 MMOL/L — SIGNIFICANT CHANGE UP (ref 3.5–5.3)
POTASSIUM SERPL-SCNC: 5.3 MMOL/L — SIGNIFICANT CHANGE UP (ref 3.5–5.3)
PROT SERPL-MCNC: 5.3 G/DL — LOW (ref 6–8.3)
PROTEUS SP DNA BLD POS QL NAA+NON-PROBE: SIGNIFICANT CHANGE UP
RBC # BLD: 3.23 M/UL — LOW (ref 4.2–5.8)
RBC # FLD: 15 % — HIGH (ref 10.3–14.5)
SODIUM SERPL-SCNC: 148 MMOL/L — HIGH (ref 135–145)
SPECIMEN SOURCE: SIGNIFICANT CHANGE UP
TRIGL SERPL-MCNC: 123 MG/DL — SIGNIFICANT CHANGE UP
WBC # BLD: 6.73 K/UL — SIGNIFICANT CHANGE UP (ref 3.8–10.5)
WBC # FLD AUTO: 6.73 K/UL — SIGNIFICANT CHANGE UP (ref 3.8–10.5)

## 2022-01-14 PROCEDURE — 99497 ADVNCD CARE PLAN 30 MIN: CPT | Mod: 25

## 2022-01-14 PROCEDURE — 99223 1ST HOSP IP/OBS HIGH 75: CPT

## 2022-01-14 PROCEDURE — 99223 1ST HOSP IP/OBS HIGH 75: CPT | Mod: 25

## 2022-01-14 RX ORDER — PIPERACILLIN AND TAZOBACTAM 4; .5 G/20ML; G/20ML
3.38 INJECTION, POWDER, LYOPHILIZED, FOR SOLUTION INTRAVENOUS EVERY 12 HOURS
Refills: 0 | Status: DISCONTINUED | OUTPATIENT
Start: 2022-01-14 | End: 2022-01-16

## 2022-01-14 RX ORDER — SODIUM CHLORIDE 9 MG/ML
1000 INJECTION, SOLUTION INTRAVENOUS
Refills: 0 | Status: DISCONTINUED | OUTPATIENT
Start: 2022-01-14 | End: 2022-01-15

## 2022-01-14 RX ORDER — HYDROMORPHONE HYDROCHLORIDE 2 MG/ML
0.2 INJECTION INTRAMUSCULAR; INTRAVENOUS; SUBCUTANEOUS
Refills: 0 | Status: DISCONTINUED | OUTPATIENT
Start: 2022-01-14 | End: 2022-01-17

## 2022-01-14 RX ORDER — ACETAMINOPHEN 500 MG
650 TABLET ORAL ONCE
Refills: 0 | Status: COMPLETED | OUTPATIENT
Start: 2022-01-14 | End: 2022-01-14

## 2022-01-14 RX ORDER — SODIUM CHLORIDE 9 MG/ML
1000 INJECTION, SOLUTION INTRAVENOUS
Refills: 0 | Status: DISCONTINUED | OUTPATIENT
Start: 2022-01-14 | End: 2022-01-14

## 2022-01-14 RX ORDER — HYDROMORPHONE HYDROCHLORIDE 2 MG/ML
0.2 INJECTION INTRAMUSCULAR; INTRAVENOUS; SUBCUTANEOUS EVERY 6 HOURS
Refills: 0 | Status: DISCONTINUED | OUTPATIENT
Start: 2022-01-14 | End: 2022-01-17

## 2022-01-14 RX ADMIN — Medication 650 MILLIGRAM(S): at 04:30

## 2022-01-14 RX ADMIN — HEPARIN SODIUM 5000 UNIT(S): 5000 INJECTION INTRAVENOUS; SUBCUTANEOUS at 19:48

## 2022-01-14 RX ADMIN — CEFTRIAXONE 100 MILLIGRAM(S): 500 INJECTION, POWDER, FOR SOLUTION INTRAMUSCULAR; INTRAVENOUS at 09:47

## 2022-01-14 RX ADMIN — SODIUM CHLORIDE 100 MILLILITER(S): 9 INJECTION, SOLUTION INTRAVENOUS at 10:44

## 2022-01-14 RX ADMIN — SODIUM CHLORIDE 100 MILLILITER(S): 9 INJECTION, SOLUTION INTRAVENOUS at 19:43

## 2022-01-14 RX ADMIN — SODIUM CHLORIDE 100 MILLILITER(S): 9 INJECTION, SOLUTION INTRAVENOUS at 00:31

## 2022-01-14 RX ADMIN — HYDROMORPHONE HYDROCHLORIDE 0.2 MILLIGRAM(S): 2 INJECTION INTRAMUSCULAR; INTRAVENOUS; SUBCUTANEOUS at 19:42

## 2022-01-14 RX ADMIN — PIPERACILLIN AND TAZOBACTAM 25 GRAM(S): 4; .5 INJECTION, POWDER, LYOPHILIZED, FOR SOLUTION INTRAVENOUS at 22:49

## 2022-01-14 RX ADMIN — HEPARIN SODIUM 5000 UNIT(S): 5000 INJECTION INTRAVENOUS; SUBCUTANEOUS at 06:04

## 2022-01-14 RX ADMIN — SODIUM CHLORIDE 100 MILLILITER(S): 9 INJECTION, SOLUTION INTRAVENOUS at 06:03

## 2022-01-14 RX ADMIN — Medication 260 MILLIGRAM(S): at 04:02

## 2022-01-14 NOTE — CHART NOTE - NSCHARTNOTEFT_GEN_A_CORE
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 In the ER pt was found hypotensive with positive UA, Ucx pending.  Patient with positive Blood Cultures x 1, with gram negative rods. Discussed findings with Attending Physician, ID consulted by Attending, will repeat blood cultures x 2, and continue to monitor patient.
Notified by RN that blood culture positive for gram negative rods in anaerobic bottle.  Patient is currently on Rocephin 1 gm IVPB daily. Consider ID consult.

## 2022-01-14 NOTE — GOALS OF CARE CONVERSATION - ADVANCED CARE PLANNING - CONVERSATION DETAILS
Referral to palliative care for complex decision making in the setting of advanced illness.   Spoke to pt's daughter and reviewed pt's clinical status. Dtr understands that pt's dementia has progressed and he is end stage which is terminal. Dtr is a nurse and understands that her father's prognosis is poor and he is approaching end of life. She is interested in a comfort centric approach and non escalation of care. She is in agreement with stopping blood drawers and discontinuing IV antibiotics once pt has secured an in-pt bed and pleasure feeds/no artificial nutrition.   MOLST paperwork was completed by ED staff with the dtr on 1/13. Reaffirmed DNR/DNI.   Dtr requesting transfer to in-pt hospice. Choice provided; dtr amenable to a referral to hospice care network. Referral made.  Support provided.

## 2022-01-14 NOTE — SWALLOW BEDSIDE ASSESSMENT ADULT - ASR SWALLOW RECOMMEND DIAG
Objective testing NOT warranted at this time given clinical presentation/patient's behavior during clinical assessment.

## 2022-01-14 NOTE — CONSULT NOTE ADULT - PROBLEM SELECTOR RECOMMENDATION 3
Pt on IV abx for GNR on blood cx 1/13. Per [facility] discussions with daughter, Fidelina, she is agreeable to discontinuation of abx once bed is available for inpatient hospice.   She did agree to comfort measures and stopping bloodwork.   If patient becomes febrile, can use tylenol rectally q6hr prn.

## 2022-01-14 NOTE — CONSULT NOTE ADULT - ASSESSMENT
89 yo M CAD, HTN, DM, dementia, sarcoma, with lethargy, decreased PO intake  No leukocytosis, no fever, slight hypothermia  Not able to interact with me, FTT, poorly verbal  CXR negative for acute cardiopulmonary process  Multiple wounds  Not able to provide further history  Overall,  1) Proteus Bacteremia  - ID by PCR, S pending; source UTI v intra-abd vs wounds  - Zosyn 3.375g q 12  - DC Ceftriaxone  - Repeat BCXs  - Check CT A/P (w/ contrast if can tolerate)  2) Hypothermia  - Monitor for resolution  - F/U pending cultures  3) Wounds  - Wound care eval    Aggressiveness of workup as above depending on patient goals of care    Poor prognosis    Andrew Brown MD  Pager [phone]  From 5pm-9am, and on weekends call [phone]
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 Patient found to be septic 2/2 bacteremia, SHAD. Palliative team consulted for further goc in setting of end stage dementia.

## 2022-01-14 NOTE — CONSULT NOTE ADULT - SUBJECTIVE AND OBJECTIVE BOX
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 Per pts daughter he has been declining for months now and until 3 days ago was only taking small sips of water and eating little bits of banana. 3 days ago he stopped eating at all and has not been taking his meds. He at baseline is unable to communicate needs but responds to pain when touched (due to arthritis). pt's daughter fidelina is an RN and has been bolusing pt 1L per day for the last 3 days. She has not noticed foul smelling urine nor any diarrhea. In the ER pt was found hypotensive with positive UA, given ceftriaxone and 3L of fluid. DNR/DNI MOLST filled out in ER with Fidelina and ER staff with decision to pursue comfort measures only (2022 19:46)"    Above reviewed. 89 yo M CAD, HTN, DM, dementia, sarcoma, with lethargy, decreased PO intake. General FTT symptoms. On my eval, patient nonverbal, not able to interact with me. Reminds to stimuli. Multiple wounds. ID called for concern new bacteremia.    PAST MEDICAL & SURGICAL HISTORY:  Diabetes mellitus type II    H/O: HTN (hypertension)    Blood incompatibility with cadaveric donor    History of BPH    Urinary urgency    Incontinence of urine    History of short term memory loss    Direct inguinal hernia    H/O: osteoarthritis    Cataract    Glaucoma    CAD (coronary artery disease)    Dementia    Sarcoma    Lung cancer    Direct inguinal hernia repair - left - 20 yrs ago    H/O arthroscopy of left knee -     h/o bilat cataract repair -  3 yrs ago    Prostate enlargement  S/P Green light laser ablation of prostate; 2012    Stenosis of coronary stent  x 3 last being     History of sarcoma  right chest wall -     Allergies    No Known Allergies    Intolerances    ANTIMICROBIALS:  cefTRIAXone   IVPB 1000 every 24 hours    OTHER MEDS:  heparin   Injectable 5000 Unit(s) SubCutaneous every 12 hours  lactated ringers. 1000 milliLiter(s) IV Continuous <Continuous>    SOCIAL HISTORY: No tobacco, no alcohol, no illicit drugs    FAMILY HISTORY:  Family history of diabetes mellitus (Sibling)  mother and brother    Family history of hypertension  mother, sister and brother    Family history of lymphoma (Child, Sibling)  sister alive , son     Drug Dosing Weight  Height (cm): 175.3 (2022 21:20)    PE:    Vital Signs Last 24 Hrs  T(C): 36.6 (2022 14:08), Max: 37.1 (2022 21:20)  T(F): 97.8 (2022 14:08), Max: 98.7 (2022 21:20)  HR: 87 (2022 14:08) (87 - 95)  BP: 132/69 (2022 14:08) (85/45 - 132/69)  RR: 19 (2022 14:08) (14 - 19)  SpO2: 100% (2022 14:08) (100% - 100%)    Gen: AOx0, poorly verbal, poorly responsive  CV: S1+S2 normal, nontachycardic  Resp: Clear bilat, no resp distress, no crackles/wheezes  Abd: Soft, nontender, +BS  Ext: No LE edema, no wounds  : No Sanchez  IV/Skin: No thrombophlebitis  Msk: No low back pain, no arthralgias, no joint swelling  Neuro: No sensory deficits, no motor deficits    LABS:                        9.4    6.73  )-----------( 141      ( 2022 07:47 )             30.4     01-14    148<H>  |  117<H>  |  125<H>  ----------------------------<  100<H>  5.3   |  16<L>  |  3.16<H>    Ca    8.2<L>      2022 07:47  Phos  5.6     01-13  Mg     3.20     01-14    TPro  5.3<L>  /  Alb  1.9<L>  /  TBili  0.4  /  DBili  0.2  /  AST  157<H>  /  ALT  86<H>  /  AlkPhos  83  01-14    Urinalysis Basic - ( 2022 09:00 )    Color: Yellow / Appearance: Slightly Turbid / S.021 / pH: x  Gluc: x / Ketone: Negative  / Bili: Negative / Urobili: 3 mg/dL   Blood: x / Protein: Trace / Nitrite: Negative   Leuk Esterase: Large / RBC: 3 /HPF / WBC 11 /HPF   Sq Epi: x / Non Sq Epi: 2 /HPF / Bacteria: Many    MICROBIOLOGY:    .Blood Blood-Peripheral  22   Growth in anaerobic bottle: Gram Negative Rods  Growth in aerobic bottle: Gram Negative Rods  ***Blood Panel PCR results on this specimen are available  approximately 3 hours after the Gram stain result.***  Gram stain, PCR, and/or culture results may not always  correspond due to difference in methodologies.  ************************************************************  This PCR assay was performed by multiplex PCR. This  Assay tests for 66 bacterial and resistance gene targets.  Please refer to the [facility] test directory  at [email or web address] for details.  --  Blood Culture PCR    (otherwise reviewed)    RADIOLOGY:     XR:    FINDINGS:  The cardiomediastinal silhouette is within normal limits.  Patient is S/P prior wedge resection of upper left lung with associated   postsurgical changes.  No focal infiltrates. No pleural effusion or pneumothorax  Degenerative changes of the thoracic spine.    IMPRESSION:  Negative for acute cardiopulmonary process.
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 Per pts daughter he has been declining for months now and until 3 days ago was only taking small sips of water and eating little bits of banana. 3 days ago he stopped eating at all and has not been taking his meds. He at baseline is unable to communicate needs but responds to pain when touched (due to arthritis). pt's daughter fidelina is an RN and has been bolusing pt 1L per day for the last 3 days. She has not noticed foul smelling urine nor any diarrhea. In the ER pt was found hypotensive with positive UA, given ceftriaxone and 3L of fluid. DNR/DNI MOLST filled out in ER with Fidelina and ER staff with decision to pursue comfort measures only (2022 19:46)    >22: Palliative team consulted for goc in setting of end stage dementia. Patient seen and examined lying in bed, non-verbal. Patient appeared to grimace and withdraw his body after gentle stimulation. Unable to answer ROS.     PERTINENT PM/SXH:   Diabetes mellitus type II    H/O: HTN (hypertension)    Blood incompatibility with cadaveric donor    History of BPH    Urinary urgency    Incontinence of urine    History of short term memory loss    Direct inguinal hernia    H/O: osteoarthritis    Cataract    Glaucoma    CAD (coronary artery disease)    Dementia    Sarcoma    Lung cancer      H/O coronary angioplasty and stent x 1 - 5 yrs ago    sigmoid resection - 20 yrs ago    Direct inguinal hernia repair - left - 20 yrs ago    H/O arthroscopy of left knee -     h/o bilat cataract repair -  3 yrs ago    Prostate enlargement    Stenosis of coronary stent    History of sarcoma      FAMILY HISTORY:  Family history of diabetes mellitus (Sibling)  mother and brother    Family history of hypertension  mother, sister and brother    Family history of lymphoma (Child, Sibling)  sister alive , son       ITEMS NOT CHECKED ARE NOT PRESENT    SOCIAL HISTORY:   Significant other/partner[ ]  Children[x ]  Evangelical/Spirituality:  Substance hx:  [ ]   Tobacco hx:  [ ]   Alcohol hx: [ ]   Home Opioid hx: NONE [ ] I-Stop Reference No: [id number]  Living Situation: [x ]Home  [ ]Long term care  [ ]Rehab [ ]Other    ADVANCE DIRECTIVES:    DNR  MOLST  [ x]  Living Will  [ ]   DECISION MAKER(s):  [ ] Health Care Proxy(s)  [x ] Surrogate(s)  [ ] Guardian           Name(s): Fidelina Liu (daughter) Phone Number(s): [phone]     BASELINE (I)ADL(s) (prior to admission): dependent of ADLs   [place]: [ ]Total  [ ] Moderate [x ]Dependent    Allergies    No Known Allergies    Intolerances    MEDICATIONS  (STANDING):  heparin   Injectable 5000 Unit(s) SubCutaneous every 12 hours  lactated ringers. 1000 milliLiter(s) (100 mL/Hr) IV Continuous <Continuous>  piperacillin/tazobactam IVPB.. 3.375 Gram(s) IV Intermittent every 12 hours    MEDICATIONS  (PRN):    PRESENT SYMPTOMS: [x ]Unable to obtain due to poor mentation   Source if other than patient:  [ ]Family   [ ]Team     Pain: [ ]yes [ ]no- See PAIN AD score   QOL impact -   Location -                    Aggravating factors -  Quality -  Radiation -  Timing-  Severity (0-10 scale):  Minimal acceptable level (0-10 scale):     PAIN AD Score: 4    [email or web address] (press ctrl +  left click to view)    Dyspnea:                           [ ]Mild [ ]Moderate [ ]Severe  Anxiety:                             [ ]Mild [ ]Moderate [ ]Severe  Fatigue:                             [ ]Mild [ ]Moderate [ ]Severe  Nausea:                             [ ]Mild [ ]Moderate [ ]Severe  Loss of appetite:              [ ]Mild [ ]Moderate [ ]Severe  Constipation:                    [ ]Mild [ ]Moderate [ ]Severe    Other Symptoms:  [ ]All other review of systems negative     Palliative Performance Status Version 2:     10    %    http://npcrc.org/files/news/palliative_performance_scale_ppsv2.pdf  PHYSICAL EXAM:  Vital Signs Last 24 Hrs  T(C): 36.6 (2022 14:08), Max: 37.1 (2022 21:20)  T(F): 97.8 (2022 14:08), Max: 98.7 (2022 21:20)  HR: 87 (2022 14:08) (87 - 95)  BP: 132/69 (2022 14:08) (85/45 - 132/69)  BP(mean): --  RR: 19 (2022 14:08) (14 - 19)  SpO2: 100% (2022 14:08) (100% - 100%) I&O's Summary    2022 07:01  -  2022 14:26  --------------------------------------------------------  IN: 0 mL / OUT: 500 mL / NET: -500 mL      GENERAL: arouseable   [ ]Alert  [ ]Oriented x   [ ]Lethargic  [ x]Cachexia  [ ]Unarousable  [ ]Verbal  [ x]Non-Verbal  Behavioral:   [ ] Anxiety  [ ] Delirium [ ] Agitation [x ] Other  HEENT:  [ ]Normal   [ ]Dry mouth   [ ]ET Tube/Trach  [ ]Oral lesions  PULMONARY:   [ ]Clear [ ]Tachypnea  [ ]Audible excessive secretions   [ ]Rhonchi        [ ]Right [ ]Left [ ]Bilateral  [ ]Crackles        [ ]Right [ ]Left [ ]Bilateral  [ ]Wheezing     [ ]Right [ ]Left [ ]Bilateral   [ ]Diminished breath sounds [ ]right [ ]left [ ]bilateral  CARDIOVASCULAR:    [ ]Regular [ ]Irregular [ ]Tachy  [ ]Giovani [ ]Murmur [ ]Other  GASTROINTESTINAL:  [ x]Soft  [ ]Distended   [ ]+BS  [ ]Non tender [ ]Tender  [ ]PEG [ ]OGT/ NGT  Last BM: unknown, prior to admission   GENITOURINARY:  [ ]Normal [ ] Incontinent   [ ]Oliguria/Anuria   [x ]Sanchez  MUSCULOSKELETAL: patient severely contracted   [ ]Normal   [ ]Weakness  [ x]Bed/Wheelchair bound [ ]Edema  NEUROLOGIC:   [ ]No focal deficits  [x ]Cognitive impairment  [x ]Dysphagia [ ]Dysarthria [ ]Paresis [ ]Other   SKIN: Pt with multiple wounds on his body. Please see nursing flowsheets   [ ]Normal    [ ]Rash  [x ]Pressure ulcer(s)       Present on admission [ x]y [ ]n    CRITICAL CARE:  [ ] Shock Present  [ ]Septic [ ]Cardiogenic [ ]Neurologic [ ]Hypovolemic  [ ]  Vasopressors [ ]  Inotropes   [ ]Respiratory failure present [ ]Mechanical ventilation [ ]Non-invasive ventilatory support [ ]High flow  [ ]Acute  [ ]Chronic [ ]Hypoxic  [ ]Hypercarbic [ ]Other  [x ]Other organ failure - renal     LABS:                        9.4    6.73  )-----------( 141      ( 2022 07:47 )             30.4       148<H>  |  117<H>  |  125<H>  ----------------------------<  100<H>  5.3   |  16<L>  |  3.16<H>    Ca    8.2<L>      2022 07:47  Phos  5.6       Mg     3.20         TPro  5.3<L>  /  Alb  1.9<L>  /  TBili  0.4  /  DBili  0.2  /  AST  157<H>  /  ALT  86<H>  /  AlkPhos  83    PT/INR - ( 2022 20:11 )   PT: 12.2 sec;   INR: 1.06 ratio         Urinalysis Basic - ( 2022 09:00 )    Color: Yellow / Appearance: Slightly Turbid / S.021 / pH: x  Gluc: x / Ketone: Negative  / Bili: Negative / Urobili: 3 mg/dL   Blood: x / Protein: Trace / Nitrite: Negative   Leuk Esterase: Large / RBC: 3 /HPF / WBC 11 /HPF   Sq Epi: x / Non Sq Epi: 2 /HPF / Bacteria: Many      RADIOLOGY & ADDITIONAL STUDIES: < from: Xray Chest 1 View- PORTABLE-Urgent (Xray Chest 1 View- PORTABLE-Urgent .) (22 @ 09:39) >  IMPRESSION:  Negative for acute cardiopulmonary process.    < end of copied text >      PROTEIN CALORIE MALNUTRITION PRESENT: [ ]mild [ ]moderate [ ]severe [ ]underweight [ ]morbid obesity  https://www.andeal.org/vault/8920/web/files/ONC/Table_Clinical%20Characteristics%20to%20Document%20Malnutrition-White%20JV%20et%20al%2020.pdf    Height (cm): 175.3 (22 @ 21:20)    [x ]PPSV2 < or = to 30% [ ]significant weight loss  [ ]poor nutritional intake  [ ]anasarca      [ ]Artificial Nutrition      REFERRALS:   [ ]Chaplaincy  [ x]Hospice  [ ]Child Life  [ ]Social Work  [ ]Case management [ ]Holistic Therapy

## 2022-01-14 NOTE — PATIENT PROFILE ADULT - NSPROPRESSUREINJURYOTHER_GEN_A_NUR
#11-Right outer foot-stage 1   #12-Right trochanter- stage 2  #13-Left upper back- stage 3  #14-Left knee- stage 1  #15- Left trochanter- unstageable

## 2022-01-14 NOTE — CONSULT NOTE ADULT - PROBLEM SELECTOR RECOMMENDATION 9
Daughter agreeable to symptom-directed care.   Start dilaudid 0.2mg q6hrs ATC  Start dilaudid 0.2mg q2hr prn mod-severe pain. If patient is requiring >3PRNs or if symptoms appear to be uncontrolled, would have low threshold to increase PRNs to 0.5mg and liberalize frequency of dilaudid ATC to q4hrs.   dulcolax suppository 10mg qd prn constipation.

## 2022-01-14 NOTE — CONSULT NOTE ADULT - PROBLEM SELECTOR RECOMMENDATION 6
Thank you for allowing us to participate in your patient's care. We will continue to follow with you. Please page [phone] for any q's or c's.     Angeline Kim D.O.   Palliative Medicine

## 2022-01-14 NOTE — SWALLOW BEDSIDE ASSESSMENT ADULT - SWALLOW EVAL: DIAGNOSIS
Patient presented with PO trials of puree, however, patient was biting down on teaspoon utensil requiring tactile prompting to release with reduced awareness of bolus and bolus anteriorly/laterally escaping from oral cavity. Oral/pharyngeal swallow could not be adequately assessed at this time.

## 2022-01-14 NOTE — SWALLOW BEDSIDE ASSESSMENT ADULT - ADDITIONAL RECOMMENDATIONS
Reconsult this department as patient medically optimizes for possible oral diet. Reconsult this department as patient medically optimizes for possible oral diet. This department to follow up as schedule permits.

## 2022-01-14 NOTE — CONSULT NOTE ADULT - PROBLEM SELECTOR RECOMMENDATION 2
PPSV 10%  Patient was non-verbal, bedbound, fully dependent on ADLs  Please assist with all ADLs   Patient with multiple pressure injuries. Re-position routinely.

## 2022-01-14 NOTE — CONSULT NOTE ADULT - PROBLEM SELECTOR RECOMMENDATION 4
Patient was non-verbal, bedbound, fully dependent on ADLs  Please assist with all ADLs   Patient with multiple pressure injuries. Re-position routinely.  Pt with end stage dementia, contracted. Daughter understands that dementia is progressive and irreversible.

## 2022-01-14 NOTE — CONSULT NOTE ADULT - PROBLEM SELECTOR PROBLEM 3
Lona at [address] [place] [address] contacted     Fax received for OT therapy; second request   Form was faxed 7/16/21 (see encounter)    Lona sent message to [facility] therapy manager; will contact office if unable to find fax.  Forms unavailable in media Bacteremia

## 2022-01-14 NOTE — PATIENT PROFILE ADULT - FALL HARM RISK - HARM RISK INTERVENTIONS
Assistance with ambulation/Assistance OOB with selected safe patient handling equipment/Communicate Risk of Fall with Harm to all staff/Discuss with provider need for PT consult/Monitor gait and stability/Reinforce activity limits and safety measures with patient and family/Tailored Fall Risk Interventions/Visual Cue: Yellow wristband and red socks/Bed in lowest position, wheels locked, appropriate side rails in place/Call bell, personal items and telephone in reach/Instruct patient to call for assistance before getting out of bed or chair/Non-slip footwear when patient is out of bed/[place] to call system/Physically safe environment - no spills, clutter or unnecessary equipment/Purposeful Proactive Rounding/Room/bathroom lighting operational, light cord in reach

## 2022-01-14 NOTE — GOALS OF CARE CONVERSATION - ADVANCED CARE PLANNING - WHAT MATTERS MOST
Daughter wishes to elevate pt's quality of life and transition to comfort centric approach of care. Interested in in-patient hospice care.

## 2022-01-14 NOTE — SWALLOW BEDSIDE ASSESSMENT ADULT - SWALLOW EVAL: RECOMMENDED DIET
1) PO is contraindicated at this time given clinical presentation 2) consider short-term non-oral means for nutrition/hydration/medication 3) RD consult as patient is at increased nutritional risk.

## 2022-01-15 LAB
-  AMIKACIN: SIGNIFICANT CHANGE UP
-  AMPICILLIN/SULBACTAM: SIGNIFICANT CHANGE UP
-  AMPICILLIN: SIGNIFICANT CHANGE UP
-  AZTREONAM: SIGNIFICANT CHANGE UP
-  CEFAZOLIN: SIGNIFICANT CHANGE UP
-  CEFEPIME: SIGNIFICANT CHANGE UP
-  CEFOXITIN: SIGNIFICANT CHANGE UP
-  CEFTRIAXONE: SIGNIFICANT CHANGE UP
-  CIPROFLOXACIN: SIGNIFICANT CHANGE UP
-  ERTAPENEM: SIGNIFICANT CHANGE UP
-  GENTAMICIN: SIGNIFICANT CHANGE UP
-  LEVOFLOXACIN: SIGNIFICANT CHANGE UP
-  MEROPENEM: SIGNIFICANT CHANGE UP
-  PIPERACILLIN/TAZOBACTAM: SIGNIFICANT CHANGE UP
-  TOBRAMYCIN: SIGNIFICANT CHANGE UP
-  TRIMETHOPRIM/SULFAMETHOXAZOLE: SIGNIFICANT CHANGE UP
CULTURE RESULTS: SIGNIFICANT CHANGE UP
METHOD TYPE: SIGNIFICANT CHANGE UP
ORGANISM # SPEC MICROSCOPIC CNT: SIGNIFICANT CHANGE UP
SPECIMEN SOURCE: SIGNIFICANT CHANGE UP

## 2022-01-15 RX ORDER — SODIUM CHLORIDE 9 MG/ML
1000 INJECTION, SOLUTION INTRAVENOUS
Refills: 0 | Status: DISCONTINUED | OUTPATIENT
Start: 2022-01-15 | End: 2022-01-16

## 2022-01-15 RX ADMIN — HYDROMORPHONE HYDROCHLORIDE 0.2 MILLIGRAM(S): 2 INJECTION INTRAMUSCULAR; INTRAVENOUS; SUBCUTANEOUS at 23:20

## 2022-01-15 RX ADMIN — HYDROMORPHONE HYDROCHLORIDE 0.2 MILLIGRAM(S): 2 INJECTION INTRAMUSCULAR; INTRAVENOUS; SUBCUTANEOUS at 07:49

## 2022-01-15 RX ADMIN — HYDROMORPHONE HYDROCHLORIDE 0.2 MILLIGRAM(S): 2 INJECTION INTRAMUSCULAR; INTRAVENOUS; SUBCUTANEOUS at 18:36

## 2022-01-15 RX ADMIN — SODIUM CHLORIDE 100 MILLILITER(S): 9 INJECTION, SOLUTION INTRAVENOUS at 08:53

## 2022-01-15 RX ADMIN — HYDROMORPHONE HYDROCHLORIDE 0.2 MILLIGRAM(S): 2 INJECTION INTRAMUSCULAR; INTRAVENOUS; SUBCUTANEOUS at 02:20

## 2022-01-15 RX ADMIN — PIPERACILLIN AND TAZOBACTAM 25 GRAM(S): 4; .5 INJECTION, POWDER, LYOPHILIZED, FOR SOLUTION INTRAVENOUS at 10:37

## 2022-01-15 RX ADMIN — HEPARIN SODIUM 5000 UNIT(S): 5000 INJECTION INTRAVENOUS; SUBCUTANEOUS at 06:29

## 2022-01-15 RX ADMIN — HYDROMORPHONE HYDROCHLORIDE 0.2 MILLIGRAM(S): 2 INJECTION INTRAMUSCULAR; INTRAVENOUS; SUBCUTANEOUS at 13:42

## 2022-01-15 RX ADMIN — PIPERACILLIN AND TAZOBACTAM 25 GRAM(S): 4; .5 INJECTION, POWDER, LYOPHILIZED, FOR SOLUTION INTRAVENOUS at 21:07

## 2022-01-15 RX ADMIN — HEPARIN SODIUM 5000 UNIT(S): 5000 INJECTION INTRAVENOUS; SUBCUTANEOUS at 18:36

## 2022-01-15 RX ADMIN — SODIUM CHLORIDE 100 MILLILITER(S): 9 INJECTION, SOLUTION INTRAVENOUS at 18:35

## 2022-01-15 RX ADMIN — HYDROMORPHONE HYDROCHLORIDE 0.2 MILLIGRAM(S): 2 INJECTION INTRAMUSCULAR; INTRAVENOUS; SUBCUTANEOUS at 01:50

## 2022-01-16 PROCEDURE — 99232 SBSQ HOSP IP/OBS MODERATE 35: CPT

## 2022-01-16 RX ORDER — CEFTRIAXONE 500 MG/1
1000 INJECTION, POWDER, FOR SOLUTION INTRAMUSCULAR; INTRAVENOUS EVERY 24 HOURS
Refills: 0 | Status: DISCONTINUED | OUTPATIENT
Start: 2022-01-16 | End: 2022-01-17

## 2022-01-16 RX ORDER — SODIUM CHLORIDE 9 MG/ML
1000 INJECTION, SOLUTION INTRAVENOUS
Refills: 0 | Status: DISCONTINUED | OUTPATIENT
Start: 2022-01-16 | End: 2022-01-17

## 2022-01-16 RX ADMIN — HYDROMORPHONE HYDROCHLORIDE 0.2 MILLIGRAM(S): 2 INJECTION INTRAMUSCULAR; INTRAVENOUS; SUBCUTANEOUS at 11:04

## 2022-01-16 RX ADMIN — SODIUM CHLORIDE 75 MILLILITER(S): 9 INJECTION, SOLUTION INTRAVENOUS at 11:04

## 2022-01-16 RX ADMIN — CEFTRIAXONE 100 MILLIGRAM(S): 500 INJECTION, POWDER, FOR SOLUTION INTRAMUSCULAR; INTRAVENOUS at 10:57

## 2022-01-16 RX ADMIN — HYDROMORPHONE HYDROCHLORIDE 0.2 MILLIGRAM(S): 2 INJECTION INTRAMUSCULAR; INTRAVENOUS; SUBCUTANEOUS at 05:19

## 2022-01-16 RX ADMIN — HYDROMORPHONE HYDROCHLORIDE 0.2 MILLIGRAM(S): 2 INJECTION INTRAMUSCULAR; INTRAVENOUS; SUBCUTANEOUS at 18:43

## 2022-01-16 RX ADMIN — HEPARIN SODIUM 5000 UNIT(S): 5000 INJECTION INTRAVENOUS; SUBCUTANEOUS at 18:42

## 2022-01-16 RX ADMIN — SODIUM CHLORIDE 100 MILLILITER(S): 9 INJECTION, SOLUTION INTRAVENOUS at 05:19

## 2022-01-16 RX ADMIN — HEPARIN SODIUM 5000 UNIT(S): 5000 INJECTION INTRAVENOUS; SUBCUTANEOUS at 05:18

## 2022-01-16 NOTE — HOSPICE CARE NOTE - CONVESATION DETAILS
Hospice RN spoke with patient's daughter who expressed agreement with a hospice plan of care at [facility] inpatient hospice unit: the [facility], in [place].

## 2022-01-16 NOTE — PROGRESS NOTE ADULT - PROBLEM SELECTOR PLAN 1
pt with failure to thrive/ end stage dementia  as per pts family wishes , no abx , no further aggressive measures only comfort care   palliative team started Dilaudid  appreciate palliative team input

## 2022-01-16 NOTE — PROGRESS NOTE ADULT - SUBJECTIVE AND OBJECTIVE BOX
SUBJECTIVE / OVERNIGHT EVENTS:pt seen and examined       MEDICATIONS  (STANDING):  cefTRIAXone   IVPB 1000 milliGRAM(s) IV Intermittent every 24 hours  heparin   Injectable 5000 Unit(s) SubCutaneous every 12 hours  HYDROmorphone  Injectable 0.2 milliGRAM(s) IV Push every 6 hours  lactated ringers. 1000 milliLiter(s) (75 mL/Hr) IV Continuous <Continuous>    MEDICATIONS  (PRN):  bisacodyl Suppository 10 milliGRAM(s) Rectal daily PRN Constipation  HYDROmorphone  Injectable 0.2 milliGRAM(s) IV Push every 2 hours PRN moderate to severe pain    Vital Signs Last 24 Hrs  T(C): 36.6 (22 @ 11:00), Max: 36.6 (01-15-22 @ 23:26)  T(F): 97.8 (22 @ 11:00), Max: 97.8 (01-15-22 @ 23:26)  HR: 80 (22 @ 11:00) (80 - 93)  BP: 99/62 (22 @ 11:00) (92/59 - 129/57)  BP(mean): --  RR: 16 (22 @ 11:00) (16 - 17)  SpO2: 95% (22 @ 11:00) (94% - 95%)    SpO2: 95% (01-15-22 @ 21:01) (94% - 100%)    CHEST/LUNG: dec breath sounds at bases  HEART:  S1 , S2 +  ABDOMEN: soft , bs+  EXTREMITIES: no edema   NEUROLOGY:pt poorly responsive    LABS:        Creatinine Trend: 3.16 <--, 3.36 <--, 3.39 <--, 3.80 <--    Urine Studies:  Urinalysis Basic - ( 2022 09:00 )    Color: Yellow / Appearance: Slightly Turbid / S.021 / pH:   Gluc:  / Ketone: Negative  / Bili: Negative / Urobili: 3 mg/dL   Blood:  / Protein: Trace / Nitrite: Negative   Leuk Esterase: Large / RBC: 3 /HPF / WBC 11 /HPF   Sq Epi:  / Non Sq Epi: 2 /HPF / Bacteria: Many                  
    SUBJECTIVE / OVERNIGHT EVENTS:pt seen and examined  01-15- 22     MEDICATIONS  (STANDING):  heparin   Injectable 5000 Unit(s) SubCutaneous every 12 hours  HYDROmorphone  Injectable 0.2 milliGRAM(s) IV Push every 6 hours  lactated ringers. 1000 milliLiter(s) (100 mL/Hr) IV Continuous <Continuous>  piperacillin/tazobactam IVPB.. 3.375 Gram(s) IV Intermittent every 12 hours    MEDICATIONS  (PRN):  bisacodyl Suppository 10 milliGRAM(s) Rectal daily PRN Constipation  HYDROmorphone  Injectable 0.2 milliGRAM(s) IV Push every 2 hours PRN moderate to severe pain    Vital Signs Last 24 Hrs  T(C): 36.3 (01-15-22 @ 21:01), Max: 36.7 (01-15-22 @ 06:20)  T(F): 97.4 (01-15-22 @ 21:01), Max: 98 (01-15-22 @ 06:20)  HR: 93 (01-15-22 @ 21:01) (77 - 93)  BP: 129/57 (01-15-22 @ 21:01) (105/65 - 129/57)  BP(mean): --  RR: 16 (01-15-22 @ 21:01) (15 - 17)  SpO2: 95% (01-15-22 @ 21:01) (94% - 100%)    CHEST/LUNG: dec breath sounds at bases  HEART:  S1 , S2 +  ABDOMEN: soft , bs+  EXTREMITIES: no edema   NEUROLOGY:pt poorly responsive    LABS:      148<H>  |  117<H>  |  125<H>  ----------------------------<  100<H>  5.3   |  16<L>  |  3.16<H>    Ca    8.2<L>      2022 07:47  Mg     3.20         TPro  5.3<L>  /  Alb  1.9<L>  /  TBili  0.4  /  DBili  0.2  /  AST  157<H>  /  ALT  86<H>  /  AlkPhos  83      Creatinine Trend: 3.16 <--, 3.36 <--, 3.39 <--, 3.80 <--                        9.4    6.73  )-----------( 141      ( 2022 07:47 )             30.4     Urine Studies:  Urinalysis Basic - ( 2022 09:00 )    Color: Yellow / Appearance: Slightly Turbid / S.021 / pH:   Gluc:  / Ketone: Negative  / Bili: Negative / Urobili: 3 mg/dL   Blood:  / Protein: Trace / Nitrite: Negative   Leuk Esterase: Large / RBC: 3 /HPF / WBC 11 /HPF   Sq Epi:  / Non Sq Epi: 2 /HPF / Bacteria: Many              LIVER FUNCTIONS - ( 2022 07:47 )  Alb: 1.9 g/dL / Pro: 5.3 g/dL / ALK PHOS: 83 U/L / ALT: 86 U/L / AST: 157 U/L / GGT: x             Gluc: x / Ketone: Negative  / Bili: Negative / Urobili: 3 mg/dL   Blood: x / Protein: Trace / Nitrite: Negative   Leuk Esterase: Large / RBC: 3 /HPF / WBC 11 /HPF   Sq Epi: x / Non Sq Epi: 2 /HPF / Bacteria: Many        RADIOLOGY & ADDITIONAL TESTS:    Imaging Personally Reviewed:    Consultant(s) Notes Reviewed:      Care Discussed with Consultants/Other Providers:  
CC: F/U for Bacteremia    Saw/spoke to patient. Ill appearing. Poorly responsive.    Allergies  No Known Allergies    ANTIMICROBIALS:  piperacillin/tazobactam IVPB.. 3.375 every 12 hours    OTHER MEDS:  bisacodyl Suppository 10 milliGRAM(s) Rectal daily PRN  heparin   Injectable 5000 Unit(s) SubCutaneous every 12 hours  HYDROmorphone  Injectable 0.2 milliGRAM(s) IV Push every 2 hours PRN  HYDROmorphone  Injectable 0.2 milliGRAM(s) IV Push every 6 hours  lactated ringers. 1000 milliLiter(s) IV Continuous <Continuous>    PE:    Vital Signs Last 24 Hrs  T(C): 36.5 (16 Jan 2022 05:17), Max: 36.6 (15 Rory 2022 23:26)  T(F): 97.7 (16 Jan 2022 05:17), Max: 97.8 (15 Rory 2022 23:26)  HR: 85 (16 Jan 2022 05:17) (77 - 93)  BP: 92/59 (16 Jan 2022 05:17) (92/59 - 129/57)  RR: 16 (16 Jan 2022 05:17) (16 - 17)  SpO2: 95% (16 Jan 2022 05:17) (94% - 99%)    Gen: AOx0, poorly responsive  CV: S1+S2 normal, nontachycardic  Resp: Clear bilat, no resp distress, no crackles/wheezes  Abd: Soft, nontender, +BS  Ext: No LE edema, no wounds    LABS:    No new available    MICROBIOLOGY:    Catheterized Catheterized  01-13-22   >100,000 CFU/ml Gram Negative Rods    .Blood Blood-Peripheral  01-13-22   No growth to date.  --  --    .Blood Blood-Peripheral  01-13-22   Growth in aerobic and anaerobic bottles: Proteus vulgaris group  ***Blood Panel PCR results on this specimen are available  approximately 3 hours after the Gram stain result.***  Gram stain, PCR, and/or culture results may not always  correspond due to difference in methodologies.  ************************************************************  This PCR assay was performed by multiplex PCR. This  Assay tests for 66 bacterial and resistance gene targets.  Please refer to the [facility] test directory  at [email or web address] for details.  --  Blood Culture PCR  Proteus vulgaris group    RADIOLOGY:    1/13 XR:    FINDINGS:  The cardiomediastinal silhouette is within normal limits.  Patient is S/P prior wedge resection of upper left lung with associated   postsurgical changes.  No focal infiltrates. No pleural effusion or pneumothorax  Degenerative changes of the thoracic spine.    IMPRESSION:  Negative for acute cardiopulmonary process.
    SUBJECTIVE / OVERNIGHT EVENTS:pt seen and examined  22     MEDICATIONS  (STANDING):  heparin   Injectable 5000 Unit(s) SubCutaneous every 12 hours  HYDROmorphone  Injectable 0.2 milliGRAM(s) IV Push every 6 hours  lactated ringers. 1000 milliLiter(s) (100 mL/Hr) IV Continuous <Continuous>  piperacillin/tazobactam IVPB.. 3.375 Gram(s) IV Intermittent every 12 hours    MEDICATIONS  (PRN):  bisacodyl Suppository 10 milliGRAM(s) Rectal daily PRN Constipation  HYDROmorphone  Injectable 0.2 milliGRAM(s) IV Push every 2 hours PRN moderate to severe pain    T(C): 36.6 (22 @ 14:08), Max: 36.9 (22 @ 06:05)  HR: 80 (22 @ 19:40) (80 - 88)  BP: 107/62 (22 @ 19:40) (91/49 - 132/69)  RR: 19 (22 @ 19:40) (19 - 19)  SpO2: 100% (22 @ 19:40) (100% - 100%)    CAPILLARY BLOOD GLUCOSE        I&O's Summary    2022 07:01  -  2022 22:13  --------------------------------------------------------  IN: 0 mL / OUT: 700 mL / NET: -700 mL    CHEST/LUNG: dec breath sounds at bases  HEART:  S1 , S2 +  ABDOMEN: soft , bs+  EXTREMITIES: no edema   NEUROLOGY:pt poorly responsive      LABS:                        9.4    6.73  )-----------( 141      ( 2022 07:47 )             30.4         148<H>  |  117<H>  |  125<H>  ----------------------------<  100<H>  5.3   |  16<L>  |  3.16<H>    Ca    8.2<L>      2022 07:47  Phos  5.6     -  Mg     3.20         TPro  5.3<L>  /  Alb  1.9<L>  /  TBili  0.4  /  DBili  0.2  /  AST  157<H>  /  ALT  86<H>  /  AlkPhos  83      PT/INR - ( 2022 20:11 )   PT: 12.2 sec;   INR: 1.06 ratio               Urinalysis Basic - ( 2022 09:00 )    Color: Yellow / Appearance: Slightly Turbid / S.021 / pH: x  Gluc: x / Ketone: Negative  / Bili: Negative / Urobili: 3 mg/dL   Blood: x / Protein: Trace / Nitrite: Negative   Leuk Esterase: Large / RBC: 3 /HPF / WBC 11 /HPF   Sq Epi: x / Non Sq Epi: 2 /HPF / Bacteria: Many        RADIOLOGY & ADDITIONAL TESTS:    Imaging Personally Reviewed:    Consultant(s) Notes Reviewed:      Care Discussed with Consultants/Other Providers:

## 2022-01-16 NOTE — PROGRESS NOTE ADULT - ASSESSMENT
88 yo M CAD, HTN, DM, dementia, sarcoma, with lethargy, decreased PO intake  No leukocytosis, no fever, slight hypothermia  Not able to interact with me, FTT, poorly verbal  CXR negative for acute cardiopulmonary process  Multiple wounds  Not able to provide further history  Now comfort care  Overall,  1) Proteus Bacteremia  - Proteus S Ceftriaxone  - Ceftriaxone 1g q 24  - Repeat BCXs  2) Hypothermia  - Monitor for resolution  - F/U pending cultures  3) Wounds  - Wound care eval    Comfort care, when DC/hospice planning, consider change to PO Ceftin if patient able to tolerate depending on GOC    Poor prognosis    Andrew Brown MD  Pager [phone]  From 5pm-9am, and on weekends call [phone]
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 In the ER pt was found hypotensive with positive UA.
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 In the ER pt was found hypotensive with positive UA.
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 In the ER pt was found hypotensive with positive UA.

## 2022-01-16 NOTE — HOSPICE CARE NOTE - FAMILY IN AGREEMENT ADDITIONAL DETAILS
Pt's daughter, Fidelina,  stated she is in agreement with transferring pt to the [facility] - pending bed availability. [facility], there will be an available bed at approx 8pm this evening. Fidelina asked if the transfer could take place tomorrow, Mon 1/17/2022 - due to her availability. She will be working an overnight shift tonight and wished to be present to her father when he arrives at the [facility].   The [facility] has patient's name and a Hospice Care  will follow up with the [facility], patient's daughter and [facility] in the morning 1/17/2022.

## 2022-01-17 ENCOUNTER — TRANSCRIPTION ENCOUNTER (OUTPATIENT)
Age: 87
End: 2022-01-17

## 2022-01-17 VITALS
OXYGEN SATURATION: 93 % | SYSTOLIC BLOOD PRESSURE: 121 MMHG | HEART RATE: 98 BPM | DIASTOLIC BLOOD PRESSURE: 71 MMHG | TEMPERATURE: 98 F | RESPIRATION RATE: 17 BRPM

## 2022-01-17 LAB — SARS-COV-2 RNA SPEC QL NAA+PROBE: SIGNIFICANT CHANGE UP

## 2022-01-17 RX ORDER — ASPIRIN/CALCIUM CARB/MAGNESIUM 324 MG
1 TABLET ORAL
Qty: 0 | Refills: 0 | DISCHARGE

## 2022-01-17 RX ORDER — MEMANTINE HYDROCHLORIDE 10 MG/1
1 TABLET ORAL
Qty: 0 | Refills: 0 | DISCHARGE

## 2022-01-17 RX ORDER — ALBUTEROL 90 UG/1
2 AEROSOL, METERED ORAL
Qty: 0 | Refills: 0 | DISCHARGE

## 2022-01-17 RX ORDER — DONEPEZIL HYDROCHLORIDE 10 MG/1
1 TABLET, FILM COATED ORAL
Qty: 0 | Refills: 0 | DISCHARGE

## 2022-01-17 RX ORDER — ALBUTEROL 90 UG/1
3 AEROSOL, METERED ORAL
Qty: 0 | Refills: 0 | DISCHARGE

## 2022-01-17 RX ORDER — VALSARTAN 80 MG/1
0 TABLET ORAL
Qty: 0 | Refills: 0 | DISCHARGE

## 2022-01-17 RX ORDER — HYDROMORPHONE HYDROCHLORIDE 2 MG/ML
0.2 INJECTION INTRAMUSCULAR; INTRAVENOUS; SUBCUTANEOUS
Qty: 0 | Refills: 0 | DISCHARGE
Start: 2022-01-17

## 2022-01-17 RX ORDER — TAMSULOSIN HYDROCHLORIDE 0.4 MG/1
1 CAPSULE ORAL
Qty: 0 | Refills: 0 | DISCHARGE

## 2022-01-17 RX ORDER — AMLODIPINE BESYLATE 2.5 MG/1
1 TABLET ORAL
Qty: 0 | Refills: 0 | DISCHARGE

## 2022-01-17 RX ORDER — CALCIUM CARBONATE 500(1250)
1 TABLET ORAL
Qty: 0 | Refills: 0 | DISCHARGE

## 2022-01-17 RX ADMIN — HYDROMORPHONE HYDROCHLORIDE 0.2 MILLIGRAM(S): 2 INJECTION INTRAMUSCULAR; INTRAVENOUS; SUBCUTANEOUS at 03:29

## 2022-01-17 RX ADMIN — HYDROMORPHONE HYDROCHLORIDE 0.2 MILLIGRAM(S): 2 INJECTION INTRAMUSCULAR; INTRAVENOUS; SUBCUTANEOUS at 12:45

## 2022-01-17 RX ADMIN — CEFTRIAXONE 100 MILLIGRAM(S): 500 INJECTION, POWDER, FOR SOLUTION INTRAMUSCULAR; INTRAVENOUS at 12:38

## 2022-01-17 RX ADMIN — HYDROMORPHONE HYDROCHLORIDE 0.2 MILLIGRAM(S): 2 INJECTION INTRAMUSCULAR; INTRAVENOUS; SUBCUTANEOUS at 00:04

## 2022-01-17 RX ADMIN — HYDROMORPHONE HYDROCHLORIDE 0.2 MILLIGRAM(S): 2 INJECTION INTRAMUSCULAR; INTRAVENOUS; SUBCUTANEOUS at 07:28

## 2022-01-17 RX ADMIN — HEPARIN SODIUM 5000 UNIT(S): 5000 INJECTION INTRAVENOUS; SUBCUTANEOUS at 07:02

## 2022-01-17 RX ADMIN — HYDROMORPHONE HYDROCHLORIDE 0.2 MILLIGRAM(S): 2 INJECTION INTRAMUSCULAR; INTRAVENOUS; SUBCUTANEOUS at 04:00

## 2022-01-17 NOTE — DISCHARGE NOTE NURSING/CASE MANAGEMENT/SOCIAL WORK - PATIENT PORTAL LINK FT
You can access the FollowMyHealth Patient Portal offered by [facility] by registering at the following website: [email or web address]. By joining [email or web address] FollowMyHealth portal, you will also be able to view your health information using other applications (apps) compatible with our system.

## 2022-01-17 NOTE — DISCHARGE NOTE PROVIDER - HOSPITAL COURSE
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 Coli and blood cultures w/ Protues  - ID following and also noted multiple wounds  - Per conversation with pt's daughter plan is to remain on antibiotics while hospitalized and discontinue upon discharge to Hospice     Acute renal failure.   - Likely from shock vs prerenal from poor PO intake. Slightly improving after boluses. 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 Coli and blood cultures w/ Protues  - ID following and also noted multiple wounds  - Per conversation with pt's daughter plan is to remain on antibiotics while hospitalized and discontinue upon discharge to Hospice     Acute renal failure.   - Likely from shock vs prerenal from poor PO intake. Slightly improving after boluses. No longer trending labs    Adult failure to thrive NPO 2/2 lethargy palliative consulted focusing on comfort per daughter and awaiting inpatient Hospice   Sanchez in place, vitals q shift, no more labs, Dilaudid on board    Dispo - Inpatient Hospice - Dr. De Paz aware

## 2022-01-17 NOTE — DISCHARGE NOTE NURSING/CASE MANAGEMENT/SOCIAL WORK - NSDCPEFALRISK_GEN_ALL_CORE
For information on Fall & Injury Prevention, visit: [email or web address] OR  [email or web address] OR  https://www.cdc.gov/steadi/patient.html

## 2022-01-17 NOTE — PROVIDER CONTACT NOTE (OTHER) - BACKGROUND
Pt came in for UTI. Failure to thrive.
Pt came in for UTI. Failure to thrive.
Admitted for UTI. PMH of lung ca, dementia, sarcoma

## 2022-01-17 NOTE — PROVIDER CONTACT NOTE (OTHER) - ASSESSMENT
Pt is A&Ox0. On FLACC scale pt is a 6 at rest. No acute distress noted.
A&O 0, nonverbal and lethargic. Current BP 92/60
Pt A&Ox0. No acute distress noted.

## 2022-01-17 NOTE — PROVIDER CONTACT NOTE (OTHER) - ACTION/TREATMENT ORDERED:
Provider made aware. Continue to monitor.
Provider made aware, ok to administer medication IVP Dilaudid. Pt is comfort care.
Provider made aware. Provider will order IV tylenol.

## 2022-01-17 NOTE — DISCHARGE NOTE PROVIDER - NSDCMRMEDTOKEN_GEN_ALL_CORE_FT
albuterol 2.5 mg/3 mL (0.083%) inhalation solution: 3 milliliter(s) inhaled every 4 hours, As Needed  albuterol 90 mcg/inh inhalation aerosol: 2 puff(s) inhaled 4 times a day, As Needed  amLODIPine 10 mg oral tablet: 1 tab(s) orally once a day  aspirin 81 mg oral tablet: 1 tab(s) orally once a day (in the morning)  atorvastatin 20 mg oral tablet: 1 tab(s) orally once a day (at bedtime)   Diovan 160 mg oral capsule: orally once a day in am  donepezil 10 mg oral tablet: 1 tab(s) orally once a day (in the morning)  Flomax 0.4 mg oral capsule: 1 cap(s) orally once a day   Multiple Vitamins oral tablet: 1 tab(s) orally once a day  last dose 5/16  Namenda 10 mg oral tablet: 1 tab(s) orally once a day (in the morning)  Os-Jose 500 (1250 mg calcium carbonate) oral tablet: 1 tab(s) orally once a day  oxycodone-acetaminophen 5 mg-325 mg oral tablet: 1 tab(s) orally every 4 hours, As Needed -for severe pain MDD:6    bisacodyl 10 mg rectal suppository: 1 suppository(ies) rectal once a day, As needed, Constipation  HYDROmorphone: 0.2 milligram(s) intravenous every 6 hours standing for pain

## 2022-01-17 NOTE — HOSPICE CARE NOTE - CONVESATION DETAILS
SW discussed case with covering CM who discussed with covering SW. Patient set up for 1pm  to [facility] in [place]. SW spoke with attending who is aware of and agrees with plan. SW gave floor RN [facility] number to give report. Pt's dtr Fidelian, DONOVANN, [facility] staff all aware of and agree with plan.   Tricia Saravia LMSW SW discussed case with covering CM who discussed with covering SW. Patient set up for 1:30pm  to [facility] in [place]. SW spoke with attending who is aware of and agrees with plan. SW gave floor RN [facility] number to give report. Pt's dtr Fidelina, DONOVANN, [facility] staff all aware of and agree with plan.   Tricia Saravia LMSW

## 2022-01-17 NOTE — DISCHARGE NOTE PROVIDER - NSDCCPCAREPLAN_GEN_ALL_CORE_FT
PRINCIPAL DISCHARGE DIAGNOSIS  Diagnosis: Adult failure to thrive  Assessment and Plan of Treatment: HOSPICE____       PRINCIPAL DISCHARGE DIAGNOSIS  Diagnosis: Adult failure to thrive  Assessment and Plan of Treatment: You are being discharged to a Hospice facility and it is recommended to focus on comfort measures and supportive care.

## 2022-01-18 LAB
CULTURE RESULTS: SIGNIFICANT CHANGE UP
SPECIMEN SOURCE: SIGNIFICANT CHANGE UP

## 2022-03-17 NOTE — PATIENT PROFILE ADULT - [id number]_GEN_A_NUR
Subjective   Aidee Trinidad is a 56 y.o. female who presents to the office today as a follow up appointment regarding Cirrhosis      History of Present Illness:  The patient presents for follow-up of cirrhosis. This was discovered incidentally while hospitalized for a broken hip.  She has been diabetic since 2011 but she does not take insulin.  The patient has a history of being hypertensive and has hyperlipidemia.  She had a triple bypass in 2011 for CAD.  Patient denies history of alcohol use that is significant.  She has put on a lot of weight in the past 30 years particularly after having children.  As far as she knows, she has never had ascites.  She denies confusion or jaundice.  She underwent ultrasound of the abdomen in July 2021 which did not show ascites but did show a nodular small liver.. The patient denies hematemesis or melena.  She did have a colonoscopy in 2012 or 2013.  This was performed in [place].  She is not interested in having a colonoscopy anytime soon although she does realize that she is due for colonoscopy.  She denies changes in mentation.  She denies bright red blood per rectum or melena.  She has had no hematemesis.  She has not had weight loss.  The patient is wheelchair-bound primarily due to her weight.  She has appear to have lost 15 pounds since her last visit in December. She has had H. Pylori in the past states that she was treated.       Review of Systems:  Review of Systems   Constitutional: Negative for fever.   HENT: Negative for trouble swallowing.    Eyes: Negative.    Respiratory: Negative for chest tightness.    Cardiovascular: Negative for chest pain.   Gastrointestinal: Positive for constipation. Negative for abdominal distention, abdominal pain, anal bleeding, blood in stool, diarrhea, nausea, rectal pain and vomiting.   Endocrine: Negative.    Musculoskeletal: Positive for back pain.   Skin: Negative.    Allergic/Immunologic: Negative for environmental allergies  and food allergies.   Hematological: Bruises/bleeds easily.   Psychiatric/Behavioral: Negative.        Past Medical History:  Past Medical History:   Diagnosis Date   • Arthritis    • Chronic back pain    • Coronary artery disease    • Depression    • Diabetes mellitus (HCC)    • Fatty liver    • Hypertension    • Hypothyroid        Past Surgical History:  Past Surgical History:   Procedure Laterality Date   • CARPAL TUNNEL RELEASE     •  SECTION     • CHOLECYSTECTOMY     • COLONOSCOPY     • CORONARY ANGIOPLASTY WITH STENT PLACEMENT     • HIP TROCHANTERIC NAILING WITH INTRAMEDULLARY HIP SCREW Right 2021    Procedure: HIP TROCHANTERIC NAILING LONG WITH INTRAMEDULLARY HIP SCREW;  Surgeon: Oracio Ponce DO;  Location: [facility];  Service: Orthopedics;  Laterality: Right;   • REPLACEMENT TOTAL KNEE Right    • TUBAL ABDOMINAL LIGATION     • UPPER GASTROINTESTINAL ENDOSCOPY         Family History:  Family History   Problem Relation Age of Onset   • COPD Mother    • Stroke Mother    • Cancer Brother    • Diabetes Brother    • Hypertension Brother    • Heart disease Brother        Social History:  Social History     Socioeconomic History   • Marital status:    Tobacco Use   • Smoking status: Never Smoker   • Smokeless tobacco: Never Used   Substance and Sexual Activity   • Alcohol use: Yes     Comment: Once a year    • Drug use: No   • Sexual activity: Defer       Current Medication List:    Current Outpatient Medications:   •  aspirin 81 MG EC tablet, Take 81 mg by mouth Daily., Disp: , Rfl:   •  atorvastatin (LIPITOR) 40 MG tablet, Take 1 tablet by mouth Every Night., Disp: 30 tablet, Rfl: 0  •  cholecalciferol (VITAMIN D3) 1.25 MG (45552 UT) capsule, Take 50,000 Units by mouth Every 7 (Seven) Days. Prior to [facility] Admission, Patient was on: Pt takes on , Disp: , Rfl:   •  furosemide (LASIX) 40 MG tablet, Take 40 mg by mouth Daily., Disp: , Rfl:   •  gabapentin (NEURONTIN) 400 MG capsule,  "Take 400 mg by mouth Every 12 (Twelve) Hours., Disp: , Rfl:   •  glipizide (GLUCOTROL) 5 MG tablet, Take 5 mg by mouth Daily., Disp: , Rfl:   •  HYDROcodone-acetaminophen (NORCO) 5-325 MG per tablet, Take 1 tablet by mouth 2 (Two) Times a Day., Disp: , Rfl:   •  levocetirizine (XYZAL) 5 MG tablet, Take 5 mg by mouth Every Evening., Disp: , Rfl:   •  levothyroxine (SYNTHROID, LEVOTHROID) 50 MCG tablet, Take 50 mcg by mouth Daily., Disp: , Rfl:   •  lisinopril (PRINIVIL,ZESTRIL) 20 MG tablet, Take 20 mg by mouth Daily As Needed (only takes if her bp is 145/90)., Disp: , Rfl:   •  loratadine-pseudoephedrine (CLARITIN-D 24-hour)  MG per 24 hr tablet, Take 1 tablet by mouth Daily As Needed for Allergies., Disp: , Rfl:   •  meclizine (ANTIVERT) 25 MG tablet, Take 25 mg by mouth Daily As Needed for Dizziness., Disp: , Rfl:   •  metFORMIN (GLUCOPHAGE) 500 MG tablet, Take 500 mg by mouth 2 (Two) Times a Day With Meals., Disp: , Rfl:   •  metoprolol tartrate (LOPRESSOR) 25 MG tablet, Take 25 mg by mouth 2 (Two) Times a Day., Disp: , Rfl:   •  potassium chloride (K-DUR,KLOR-CON) 10 MEQ CR tablet, Take 10 mEq by mouth 2 (Two) Times a Day., Disp: , Rfl:   •  QUEtiapine (SEROquel) 50 MG tablet, Take 50 mg by mouth Every Night., Disp: , Rfl:   •  sennosides-docusate (PERICOLACE) 8.6-50 MG per tablet, Take 1 tablet by mouth 2 (Two) Times a Day., Disp: 60 tablet, Rfl: 0  •  sertraline (ZOLOFT) 50 MG tablet, Take 50 mg by mouth Daily., Disp: , Rfl:     Allergies:   Nuts, Contrast dye, Codeine, and Latex    Vitals:  /69   Pulse 69   Ht 144.8 cm (57\")   Wt 107 kg (235 lb)   SpO2 94%   BMI 50.85 kg/m²     Physical Exam:  Physical Exam  Constitutional:       Appearance: She is normal weight.   HENT:      Head: Normocephalic and atraumatic.      Nose: Nose normal. No congestion or rhinorrhea.   Eyes:      General: No scleral icterus.     Extraocular Movements: Extraocular movements intact.      Conjunctiva/sclera: " Conjunctivae normal.      Pupils: Pupils are equal, round, and reactive to light.   Cardiovascular:      Rate and Rhythm: Normal rate and regular rhythm.      Pulses: Normal pulses.      Heart sounds: Normal heart sounds.   Pulmonary:      Effort: Pulmonary effort is normal.      Breath sounds: Normal breath sounds.   Abdominal:      General: Abdomen is flat. Bowel sounds are normal. There is no distension.      Palpations: Abdomen is soft. There is no shifting dullness, fluid wave, hepatomegaly, splenomegaly, mass or pulsatile mass.      Tenderness: There is no abdominal tenderness. There is no guarding or rebound.      Hernia: No hernia is present.   Musculoskeletal:         General: No swelling or tenderness.      Cervical back: Normal range of motion and neck supple.      Comments: Wheelchair for stability   Skin:     General: Skin is warm and dry.      Coloration: Skin is not jaundiced.   Neurological:      General: No focal deficit present.      Mental Status: She is alert and oriented to person, place, and time.   Psychiatric:         Mood and Affect: Mood normal.         Behavior: Behavior normal.         Results Review:  Lab Results:   No visits with results within 1 Month(s) from this visit.   Latest known visit with results is:   Office Visit on 12/08/2021   Component Date Value Ref Range Status   • ALPHA-FETOPROTEIN 12/08/2021 5.37  0 - 8.3 ng/mL Final   • Glucose 12/08/2021 82  65 - 99 mg/dL Final   • BUN 12/08/2021 5 (A) 6 - 20 mg/dL Final   • Creatinine 12/08/2021 0.56 (A) 0.57 - 1.00 mg/dL Final   • Sodium 12/08/2021 138  136 - 145 mmol/L Final   • Potassium 12/08/2021 3.9  3.5 - 5.2 mmol/L Final   • Chloride 12/08/2021 100  98 - 107 mmol/L Final   • CO2 12/08/2021 30.6 (A) 22.0 - 29.0 mmol/L Final   • Calcium 12/08/2021 9.5  8.6 - 10.5 mg/dL Final   • Total Protein 12/08/2021 7.6  6.0 - 8.5 g/dL Final   • Albumin 12/08/2021 3.50  3.50 - 5.20 g/dL Final   • ALT (SGPT) 12/08/2021 50 (A) 1 - 33 U/L  Final   • AST (SGOT) 12/08/2021 82 (A) 1 - 32 U/L Final   • Alkaline Phosphatase 12/08/2021 181 (A) 39 - 117 U/L Final   • Total Bilirubin 12/08/2021 0.9  0.0 - 1.2 mg/dL Final   • eGFR Non  Amer 12/08/2021 112  >60 mL/min/1.73 Final   • Globulin 12/08/2021 4.1  gm/dL Final   • A/G Ratio 12/08/2021 0.9  g/dL Final   • BUN/Creatinine Ratio 12/08/2021 8.9  7.0 - 25.0 Final   • Anion Gap 12/08/2021 7.4  5.0 - 15.0 mmol/L Final       Assessment/Plan     Visit Diagnoses:    ICD-10-CM ICD-9-CM   1. Iron deficiency anemia, unspecified iron deficiency anemia type  D50.9 280.9   2. Cirrhosis of liver without ascites, unspecified hepatic cirrhosis type (HCC)  K74.60 571.5       Plan:  The patient will undergo EGD due to cirrhosis.  I will check for varices.  She has had H. Pylori gastritis in the past.  She does not want to do a colonoscopy right now.  She will be due for ultrasound of the abdomen next visit for screening for hepatocellular carcinoma.  I did explain to the patient that patients with cirrhosis are at increased risk for developing tumors of the liver.  She has had a fairly recent AFP that was negative.    ESOPHAGOGASTRODUODENOSCOPY WITH BIOPSY (N/A)      MEDS ORDERED DURING VISIT:  No orders of the defined types were placed in this encounter.      Return in about 3 months (around 6/17/2022).             This document has been electronically signed by Lizzeth Harmon MD   March 17, 2022 20:45 EDT      Part of this note may be an electronic transcription/translation of spoken language to printed text using the Dragon Dictation System.   stage I

## 2022-06-28 NOTE — H&P PST ADULT - EAR CANAL R
Quality 130: Documentation Of Current Medications In The Medical Record: Current Medications Documented
normal
Quality 431: Preventive Care And Screening: Unhealthy Alcohol Use - Screening: Patient not identified as an unhealthy alcohol user when screened for unhealthy alcohol use using a systematic screening method
Quality 226: Preventive Care And Screening: Tobacco Use: Screening And Cessation Intervention: Patient screened for tobacco use, is a smoker AND received Cessation Counseling within the Previous 12 Months
Detail Level: Detailed

## 2022-07-11 NOTE — PHYSICAL THERAPY INITIAL EVALUATION ADULT - DIAGNOSIS, PT EVAL
On Wednesday after coming back from fishing trip:  - Take off BOTH buprenorphine 5mcg/hr patches  - Apply buprenorphine 15mcg/hr patch    Continue good laxative use to have at least one SOFT bowel movement every other day. Can use acetaminophen (aka Tylenol) up to 3,000mg daily if needed for breakthrough pain.     Call us [phone] if any questions or concerns decreased functional mobility

## 2023-01-06 NOTE — ASU PREOP CHECKLIST - LOOSE TEETH
Reason for Call:  Other call back    Detailed comments: Pt has an appt on  3/3, need Dr to call Pt daughter Destinee concerning med pt is taking     Phone Number Patient can be reached at: Cell number on file:    Telephone Information:   Mobile [phone]       Best Time: anytime    Can we leave a detailed message on this number? YES    Call taken on 1/6/2023 at 1:43 PM by Laura Mcgee       no

## 2023-02-21 NOTE — H&P PST ADULT - NECK DETAILS
[Cervical Pap Smear] : cervical Pap smear [Liquid Base] : liquid base [Tolerated Well] : the patient tolerated the procedure well [No Complications] : there were no complications [Date: ___] : Date: [unfilled] [Current GA by Sonogram: ___ (wks)] : Current GA by Sonogram: [unfilled]Uwks [___ day(s)] : [unfilled] days [FreeTextEntry1] : crl 9.1 wks pos fh, pos fm edc 09/24/2023 by lmp  normal thyroid gland/no JVD/supple

## 2023-04-06 NOTE — DISCHARGE NOTE PROVIDER - CARE PROVIDERS DIRECT ADDRESSES
Hub to read, I called the patient to see if she has a specific request for pt since she lives in [place]   
,DirectAddress_Unknown

## 2023-08-21 NOTE — PHYSICAL THERAPY INITIAL EVALUATION ADULT - IMPAIRMENTS CONTRIBUTING TO GAIT DEVIATIONS, PT EVAL

## 2023-08-28 NOTE — ED ADULT NURSE NOTE - CAS DISCH TRANSFER METHOD
86yo Cantonese speaking male PMHx DM2, HTN here with decreased appetite found to have HHS/ DKA s/p insulin gtt. Melena s/p EGD demonstrating ulcer. Hungatella bacteremia. Acute Appendicitis. Patient aaox1-2 at baseline.  Patient was admitted to MICU 8/4 and transferred to the medicine floor 8/17  Patient was first in MICU for DKA/HHS    #right arm skin necrosis  -burn will re-evaluate today for possible debridement    #Constipation  #Distended abd  -kub 8/27 mod stool with dilated bowel, non obstructive pattern  -started on senna/miralax and lactulose   - will change lactulose to standing and add enemas if no BM by tomorrow    #suspected COPD, with wheezing  #B/l pleural Effusion  - patient has smoking history of 2 PPD for 60yrs, already quit  - TTE 8/11: EF 63%  - pulmonary consulted, recs appreciated  - completing prednisone tapber- 20 for 5 days-day 3/5  - duonebs q6h prn  - repeat CXR 8/21: stable  - was given doses of iv lasix  - HOB elevation 45 degrees  - aspiration precautions  - PT rec SNF    #Decreased oral intake - improving  - calorie count, f/u nutrition eval  - patient tolerating po intake, NG tube taken out 8/22  - S&S eval 8/23: advance diet to soft bite-sized with thin liquid  - Nutrition consult appreciated - pt ok to continue oral diet     #Hungatella bacteremia likely GI translocation in the setting of appendicitis  - bcx 8/4 +hungatella  - bcx 8/5 onwards ntd  - TTE showed no vegetations  - s/p kenyatta, flagyl, cefepime, vanco  - monitor off Abx now per ID    #Melena  #Abdominal pain  - s/p egd 8/14 with two ulcers; one was cauterized, given epi  - CTAP 8/19: No definite correlate for acute abdominal pain. Unchanged nonspecific mild dilation of appendix, up to 0.8 cm, without definite periappendiceal inflammatory stranding.  - h/h stable, trend  - protonix 40 po bid    #Transaminitis - resolved  #abd tenderness  - mixed, in setting of infection  - ct abd with hepatic cyst    #DM2 (diabetes mellitus, type 2) - controlled  - s/p insulin gtt for HHS/ DKA  - increase to lantus 15 and lispro 3  - ssi        #Hypokalemia -resolved       #HTN (hypertension) - stable  - outpt f/u          DVT PPx   GI PPx  Dispo: patient is from home, will req SNF per PT  pending debridement , bs control, kub offical read, bowel movement    88yo Cantonese speaking male PMHx DM2, HTN here with decreased appetite found to have HHS/ DKA s/p insulin gtt. Melena s/p EGD demonstrating ulcer. Hungatella bacteremia. Acute Appendicitis. Patient aaox1-2 at baseline.  Patient was admitted to MICU 8/4 and transferred to the medicine floor 8/17  Patient was first in MICU for DKA/HHS    #right arm skin necrosis  -burn will re-evaluate today for possible debridement    #Constipation  #Distended abd  -kub 8/27 mod stool with dilated bowel, non obstructive pattern  -started on senna/miralax and lactulose   - will change lactulose to standing and add enemas if no BM by tomorrow    #suspected COPD, with wheezing  #B/l pleural Effusion  - patient has smoking history of 2 PPD for 60yrs, already quit  - TTE 8/11: EF 63%  - pulmonary consulted, recs appreciated  - completing prednisone tapber- 20 for 5 days-day 3/5  - duonebs q6h prn  - repeat CXR 8/21: stable  - was given doses of iv lasix  - HOB elevation 45 degrees  - aspiration precautions  - PT rec SNF    #Decreased oral intake - improving  - calorie count, f/u nutrition eval  - patient tolerating po intake, NG tube taken out 8/22  - S&S eval 8/23: advance diet to soft bite-sized with thin liquid  - Nutrition consult appreciated - pt ok to continue oral diet     #Hungatella bacteremia likely GI translocation in the setting of appendicitis  - bcx 8/4 +hungatella  - bcx 8/5 onwards ntd  - TTE showed no vegetations  - s/p kenyatta, flagyl, cefepime, vanco  - monitor off Abx now per ID    #Melena  #Abdominal pain  - s/p egd 8/14 with two ulcers; one was cauterized, given epi  - CTAP 8/19: No definite correlate for acute abdominal pain. Unchanged nonspecific mild dilation of appendix, up to 0.8 cm, without definite periappendiceal inflammatory stranding.  - h/h stable, trend  - protonix 40 po bid    #Transaminitis - resolved  #abd tenderness  - mixed, in setting of infection  - ct abd with hepatic cyst    #DM2 (diabetes mellitus, type 2) - controlled  - s/p insulin gtt for HHS/ DKA  - increase to lantus 15 and lispro 3  - ssi        #Hypokalemia -resolved       #HTN (hypertension) - stable  - outpt f/u      Attempted to call daughter Antonia twice to update on medical condition but there was no answer. will attempt again tomorrow.    DVT PPx   GI PPx  Dispo: patient is from home, will req SNF per PT  pending debridement , bs control, kub offical read, bowel movement    Private car

## 2024-01-16 NOTE — ED PROVIDER NOTE - NS ED ATTENDING STATEMENT MOD
[de-identified] : Date of Injury/Onset:     1 yr Pain: At Rest:5 /10   With Activity:8-9 /10 Affecting Sleep:Y Difficulty with stairs:Y Difficulty getting in and out of car:Y Sit to stand stiffness:Y Affects walking short/long distances?Y Home/Work/Recreation effected?  Y Mechanism of injury: NKI This not a Work-Related Injury being treated under Worker's Compensation. This  not   an athletic injury occurring associated with an interscholastic or organized sports team. Quality of symptoms:C/O MEDIAL SIDED PAIN, NO SWELLING, GIVES OUT/UNSTABLE Improves with:   NOTHING Worse with:   WALKING Have you been treated for this in the past? Y Have you had surgery for this in the past?Y OTC Medicines:MOTRIN RX medicines: NO Heat, Ice, Elevation: Y CSI or Gel Injections:  (Indicate which)N Physical Therapy/ HEP:  N Previous Treatment/Imaging/Studies Since Last Visit: [facility] Reports Available for Review Today:NONE PATIENT HAS LEFT TKA DONE YEARS AGO BY [facility] patient took prednisone in dec for 2 weeks and his knee felt good.   Attending with

## 2024-06-20 NOTE — H&P PST ADULT - NS ABD PE RECTAL EXAM
[facility])     Hammertoe     patient refused

## 2025-04-18 NOTE — H&P PST ADULT - PAIN CHRONIC, PROFILE
Pt has been having asthma all night until this am,  using inhaler all night until this am; Pt ran out of rescue albuterol/daily inhaler.  Pt uncertain what to do because last evenings episode scared pt.      Albuterol and Wixela   yes
